# Patient Record
Sex: FEMALE | Race: WHITE | NOT HISPANIC OR LATINO | Employment: OTHER | URBAN - NONMETROPOLITAN AREA
[De-identification: names, ages, dates, MRNs, and addresses within clinical notes are randomized per-mention and may not be internally consistent; named-entity substitution may affect disease eponyms.]

---

## 2017-01-05 DIAGNOSIS — F32.A DEPRESSION: Primary | ICD-10-CM

## 2017-01-06 RX ORDER — FLUOXETINE 10 MG/1
CAPSULE ORAL
Qty: 30 CAPSULE | Refills: 0 | Status: SHIPPED | OUTPATIENT
Start: 2017-01-06 | End: 2017-02-01

## 2017-01-20 ENCOUNTER — OFFICE VISIT (OUTPATIENT)
Dept: FAMILY MEDICINE | Facility: OTHER | Age: 62
End: 2017-01-20
Attending: FAMILY MEDICINE
Payer: COMMERCIAL

## 2017-01-20 VITALS
TEMPERATURE: 99.2 F | SYSTOLIC BLOOD PRESSURE: 114 MMHG | HEART RATE: 67 BPM | DIASTOLIC BLOOD PRESSURE: 72 MMHG | HEIGHT: 60 IN | RESPIRATION RATE: 18 BRPM | BODY MASS INDEX: 25.91 KG/M2 | WEIGHT: 132 LBS | OXYGEN SATURATION: 96 %

## 2017-01-20 DIAGNOSIS — F33.1 MAJOR DEPRESSIVE DISORDER, RECURRENT EPISODE, MODERATE (H): Primary | ICD-10-CM

## 2017-01-20 PROCEDURE — 99213 OFFICE O/P EST LOW 20 MIN: CPT | Performed by: FAMILY MEDICINE

## 2017-01-20 ASSESSMENT — PAIN SCALES - GENERAL: PAINLEVEL: NO PAIN (0)

## 2017-01-20 NOTE — MR AVS SNAPSHOT
"              After Visit Summary   1/20/2017    Mechelle Fong    MRN: [id number]           Patient Information     Date Of Birth          1955        Visit Information        Provider Department      1/20/2017 11:20 AM Bhavin Khan MD [facility]        Today's Diagnoses     Major depressive disorder, recurrent episode, moderate (H)    -  1       Care Instructions    Increase fluoxetine to 20 mg daily        Follow-ups after your visit        Follow-up notes from your care team     Return in about 1 month (around 2/20/2017) for Follow Up Visit.      Who to contact     If you have questions or need follow up information about today's clinic visit or your schedule please contact [facility] directly at [phone].  Normal or non-critical lab and imaging results will be communicated to you by MyChart, letter or phone within 4 business days after the clinic has received the results. If you do not hear from us within 7 days, please contact the clinic through MyChart or phone. If you have a critical or abnormal lab result, we will notify you by phone as soon as possible.  Submit refill requests through [employer] or call your pharmacy and they will forward the refill request to us. Please allow 3 business days for your refill to be completed.          Additional Information About Your Visit        MyChart Information     [employer] lets you send messages to your doctor, view your test results, renew your prescriptions, schedule appointments and more. To sign up, go to [email or web address] . Click on \"Log in\" on the left side of the screen, which will take you to the Welcome page. Then click on \"Sign up Now\" on the right side of the page.     You will be asked to enter the access code listed below, as well as some personal information. Please follow the directions to create your username and password.     Your access code is: [email or web address]  Expires: 4/21/2017  4:09 PM     Your access code " will  in 90 days. If you need help or a new code, please call your [facility] or [phone].        Care EveryWhere ID     This is your Care EveryWhere ID. This could be used by other organizations to access your [place] medical records  [id number]        Your Vitals Were     Pulse Temperature Respirations Height BMI (Body Mass Index) Pulse Oximetry    67 99.2  F (37.3  C) (Tympanic) 18 5' (1.524 m) 25.78 kg/m2 96%       Blood Pressure from Last 3 Encounters:   17 114/72   16 111/62   11/04/15 112/60    Weight from Last 3 Encounters:   17 132 lb (59.875 kg)   16 133 lb (60.328 kg)   11/04/15 134 lb (60.782 kg)              Today, you had the following     No orders found for display         Today's Medication Changes          These changes are accurate as of: 17 11:59 PM.  If you have any questions, ask your nurse or doctor.               These medicines have changed or have updated prescriptions.        Dose/Directions    * FLUoxetine 10 MG capsule   Commonly known as:  PROzac   This may have changed:  Another medication with the same name was added. Make sure you understand how and when to take each.   Used for:  Depression        TAKE ONE CAPSULE BY MOUTH ONCE DAILY   Quantity:  30 capsule   Refills:  0       * FLUoxetine 20 MG capsule   Commonly known as:  PROzac   This may have changed:  You were already taking a medication with the same name, and this prescription was added. Make sure you understand how and when to take each.   Used for:  Major depressive disorder, recurrent episode, moderate (H)        Dose:  20 mg   Take 1 capsule (20 mg) by mouth daily   Quantity:  60 capsule   Refills:  3       * Notice:  This list has 2 medication(s) that are the same as other medications prescribed for you. Read the directions carefully, and ask your doctor or other care provider to review them with you.         Where to get your medicines      These medications were sent to  [facility] [place], [place] - [address]   [address] , [facility] [address]     Phone:  [phone]    - FLUoxetine 20 MG capsule             Primary Care Provider Office Phone # Fax #    Bhavin Khan [phone]       [facility] [address] [place] [address]        Thank you!     Thank you for choosing [facility]  for your care. Our goal is always to provide you with excellent care. Hearing back from our patients is one way we can continue to improve our services. Please take a few minutes to complete the written survey that you may receive in the mail after your visit with us. Thank you!             Your Updated Medication List - Protect others around you: Learn how to safely use, store and throw away your medicines at www.disposemymeds.org.          This list is accurate as of: 1/20/17 11:59 PM.  Always use your most recent med list.                   Brand Name Dispense Instructions for use    clonazePAM 0.5 MG tablet    klonoPIN    60 tablet    Take 0.5-1 tablets (0.25-0.5 mg) by mouth 2 times daily as needed for anxiety       * FLUoxetine 10 MG capsule    PROzac    30 capsule    TAKE ONE CAPSULE BY MOUTH ONCE DAILY       * FLUoxetine 20 MG capsule    PROzac    60 capsule    Take 1 capsule (20 mg) by mouth daily       * Notice:  This list has 2 medication(s) that are the same as other medications prescribed for you. Read the directions carefully, and ask your doctor or other care provider to review them with you.

## 2017-01-20 NOTE — NURSING NOTE
Chief Complaint   Patient presents with     Recheck Medication       Initial /72 mmHg  Pulse 67  Temp(Src) 99.2  F (37.3  C) (Tympanic)  Resp 18  Ht 5' (1.524 m)  Wt 132 lb (59.875 kg)  BMI 25.78 kg/m2  SpO2 96% Estimated body mass index is 25.78 kg/(m^2) as calculated from the following:    Height as of this encounter: 5' (1.524 m).    Weight as of this encounter: 132 lb (59.875 kg).  BP completed using cuff size: shi Mccoy LPN

## 2017-01-20 NOTE — PROGRESS NOTES
SUBJECTIVE:  Mechelle Fong, 61 year old, female is seen in follow up of depression with associated anxiety.  Her symptoms have worsened and she notes significant psychosocial stressors.  PHQ 9 and MOOKIE 7 are reviewed.    Current Outpatient Prescriptions   Medication Sig Dispense Refill     FLUoxetine (PROZAC) 10 MG capsule TAKE ONE CAPSULE BY MOUTH ONCE DAILY 30 capsule 0     clonazePAM (KLONOPIN) 0.5 MG tablet Take 0.5-1 tablets (0.25-0.5 mg) by mouth 2 times daily as needed for anxiety 60 tablet 0        Allergies   Allergen Reactions     Paxil [Paroxetine Mesylate] Other (See Comments)     Intolerance  Paroxetine HCL           Past Medical History   Diagnosis Date     Dysthymic disorder 10/22/2001     Depression 10/09/2001     Past Surgical History   Procedure Laterality Date     Excision       ovarian tumor, LT     Egd       Dilation and curettage       D&C     Colonoscopy       Release trigger finger       RT     Vaginal hysterectomy /lso       Breast biopsy       RT     Family History   Problem Relation Age of Onset     Cancer - colorectal Maternal Grandmother      CANCER Mother      Lung     CEREBROVASCULAR DISEASE Father      HEART DISEASE Father      Heart failure, cause of death     Hypertension Brother      HEART DISEASE Father 50     Myocardial infarction     Social History     Social History     Marital Status:      Spouse Name: N/A     Number of Children: N/A     Years of Education: N/A     Occupational History      [employer]     full-time     Social History Main Topics     Smoking status: Current Every Day Smoker     Types: Cigarettes     Smokeless tobacco: Not on file      Comment: tried to quit-yes     Alcohol Use: Yes      Comment: occasionally     Drug Use: Not on file     Sexual Activity: Not on file     Other Topics Concern     Caffeine Concern Yes     coffee, > 6 cups daily     Social History Narrative         Review Of Systems  Constitutional, HEENT, cardiovascular,  pulmonary, gi and gu systems are negative, except as otherwise noted.    OBJECTIVE:  Vitals: B/P: 111/62, T: 99.2, P: 70, R: 18    Exam:  Physical Exam   Constitutional: She is oriented to person, place, and time. She appears well-developed and well-nourished. No distress.   Neurological: She is alert and oriented to person, place, and time.   Psychiatric: She has a normal mood and affect.     Other exam not repeated.  Major depressive disorder, recurrent episode, moderate (H)  Increase fluoxetine to 20 mg daily. Follow up one month,  - FLUoxetine (PROZAC) 20 MG capsule; Take 1 capsule (20 mg) by mouth daily      Labs:  No results found for any previous visit.    ASSESSMENT/PLAN:              Bhavin Khan MD

## 2017-03-27 DIAGNOSIS — F41.1 GAD (GENERALIZED ANXIETY DISORDER): ICD-10-CM

## 2017-03-27 RX ORDER — CLONAZEPAM 0.5 MG/1
TABLET ORAL
Qty: 60 TABLET | Refills: 0 | Status: SHIPPED | OUTPATIENT
Start: 2017-03-27 | End: 2017-09-25

## 2017-03-27 NOTE — TELEPHONE ENCOUNTER
Klonopin      Last Written Prescription Date:  12.5.16  Last Fill Quantity: 60,   # refills: 0  Last Office Visit with [facility] prescribing provider: 1.20.17  Future Office visit:       Routing refill request to provider for review/approval because:  Drug not on the [facility] refill protocol or controlled substance

## 2017-08-27 ENCOUNTER — HEALTH MAINTENANCE LETTER (OUTPATIENT)
Age: 62
End: 2017-08-27

## 2017-09-25 DIAGNOSIS — F41.1 GAD (GENERALIZED ANXIETY DISORDER): ICD-10-CM

## 2017-09-25 DIAGNOSIS — F33.1 MAJOR DEPRESSIVE DISORDER, RECURRENT EPISODE, MODERATE (H): ICD-10-CM

## 2017-09-26 RX ORDER — CLONAZEPAM 0.5 MG/1
TABLET ORAL
Qty: 60 TABLET | Refills: 0 | Status: SHIPPED | OUTPATIENT
Start: 2017-09-26 | End: 2018-03-25

## 2017-09-26 NOTE — TELEPHONE ENCOUNTER
Prozac  Last office visit: 1/20/17 with note: Increase fluoxetine to 20 mg daily. Follow up one month.  No follow up.  No upcoming scheduled appointments.  Last refill: 1/20/17 #60, 3 R.    Lenka  Last refill: 3/27/17 #60, 0 R.  Medications pended.  Thank you.

## 2018-02-16 ENCOUNTER — OFFICE VISIT (OUTPATIENT)
Dept: FAMILY MEDICINE | Facility: OTHER | Age: 63
End: 2018-02-16
Attending: FAMILY MEDICINE
Payer: COMMERCIAL

## 2018-02-16 VITALS
OXYGEN SATURATION: 98 % | SYSTOLIC BLOOD PRESSURE: 114 MMHG | HEART RATE: 70 BPM | DIASTOLIC BLOOD PRESSURE: 68 MMHG | TEMPERATURE: 98.8 F | WEIGHT: 132 LBS | HEIGHT: 60 IN | BODY MASS INDEX: 25.91 KG/M2

## 2018-02-16 DIAGNOSIS — N76.0 ACUTE VAGINITIS: Primary | ICD-10-CM

## 2018-02-16 LAB
ALBUMIN UR-MCNC: NEGATIVE MG/DL
APPEARANCE UR: CLEAR
BACTERIA #/AREA URNS HPF: ABNORMAL /HPF
BILIRUB UR QL STRIP: NEGATIVE
COLOR UR AUTO: ABNORMAL
GLUCOSE UR STRIP-MCNC: NEGATIVE MG/DL
HGB UR QL STRIP: ABNORMAL
KETONES UR STRIP-MCNC: NEGATIVE MG/DL
LEUKOCYTE ESTERASE UR QL STRIP: NEGATIVE
MUCOUS THREADS #/AREA URNS LPF: PRESENT /LPF
NITRATE UR QL: NEGATIVE
PH UR STRIP: 6.5 PH (ref 4.7–8)
RBC #/AREA URNS AUTO: 5 /HPF (ref 0–2)
SOURCE: ABNORMAL
SP GR UR STRIP: 1.01 (ref 1–1.03)
SPECIMEN SOURCE: NORMAL
UROBILINOGEN UR STRIP-MCNC: NORMAL MG/DL (ref 0–2)
WBC #/AREA URNS AUTO: 0 /HPF (ref 0–2)
WET PREP SPEC: NORMAL

## 2018-02-16 PROCEDURE — 99213 OFFICE O/P EST LOW 20 MIN: CPT | Performed by: FAMILY MEDICINE

## 2018-02-16 PROCEDURE — 87210 SMEAR WET MOUNT SALINE/INK: CPT | Performed by: FAMILY MEDICINE

## 2018-02-16 PROCEDURE — 81001 URINALYSIS AUTO W/SCOPE: CPT | Performed by: FAMILY MEDICINE

## 2018-02-16 RX ORDER — METRONIDAZOLE 7.5 MG/G
1 GEL VAGINAL AT BEDTIME
Qty: 70 G | Refills: 0 | Status: SHIPPED | OUTPATIENT
Start: 2018-02-16 | End: 2018-04-27

## 2018-02-16 ASSESSMENT — ANXIETY QUESTIONNAIRES
7. FEELING AFRAID AS IF SOMETHING AWFUL MIGHT HAPPEN: NOT AT ALL
IF YOU CHECKED OFF ANY PROBLEMS ON THIS QUESTIONNAIRE, HOW DIFFICULT HAVE THESE PROBLEMS MADE IT FOR YOU TO DO YOUR WORK, TAKE CARE OF THINGS AT HOME, OR GET ALONG WITH OTHER PEOPLE: NOT DIFFICULT AT ALL
6. BECOMING EASILY ANNOYED OR IRRITABLE: NOT AT ALL
1. FEELING NERVOUS, ANXIOUS, OR ON EDGE: NOT AT ALL
3. WORRYING TOO MUCH ABOUT DIFFERENT THINGS: NOT AT ALL
2. NOT BEING ABLE TO STOP OR CONTROL WORRYING: NOT AT ALL
5. BEING SO RESTLESS THAT IT IS HARD TO SIT STILL: NOT AT ALL
GAD7 TOTAL SCORE: 0

## 2018-02-16 ASSESSMENT — PATIENT HEALTH QUESTIONNAIRE - PHQ9: 5. POOR APPETITE OR OVEREATING: NOT AT ALL

## 2018-02-16 ASSESSMENT — PAIN SCALES - GENERAL: PAINLEVEL: MILD PAIN (2)

## 2018-02-16 NOTE — PROGRESS NOTES
SUBJECTIVE:  Mechelle Fong, 63 year old, female is seen with the following medical problems.    Vaginal discharge.  She has developed discolored discharge over the last 3 days.  Mechelle used over the counter Monistat with minimal improvement. She has had a previous hysterectomy.  This is associated pelvic pain.    No fever, flank pain, nausea, vomiting, dysuria, hematuria, pneumaturia, nocturia, frequency, urgency, or incontinence.    Current Outpatient Prescriptions   Medication Sig Dispense Refill     clonazePAM (KLONOPIN) 0.5 MG tablet TAKE ONE-HALF TO ONE TABLET BY MOUTH TWICE DAILY AS NEEDED FOR ANXIETY 60 tablet 0     FLUoxetine (PROZAC) 20 MG capsule TAKE ONE CAPSULE BY MOUTH ONCE DAILY 30 capsule 7        Allergies   Allergen Reactions     Morphine Other (See Comments)     Hypotension      Paxil [Paroxetine Mesylate] Other (See Comments)     Intolerance  Paroxetine HCL           Past Medical History:   Diagnosis Date     Depression 10/09/2001     Dysthymic disorder 10/22/2001     Past Surgical History:   Procedure Laterality Date     breast biopsy      RT     COLONOSCOPY       DILATION AND CURETTAGE      D&C     EGD       excision      ovarian tumor, LT     RELEASE TRIGGER FINGER      RT     vaginal hysterectomy /LSO       Family History   Problem Relation Age of Onset     Cancer - colorectal Maternal Grandmother      CANCER Mother      Lung     CEREBROVASCULAR DISEASE Father      HEART DISEASE Father      Heart failure, cause of death     Hypertension Brother      HEART DISEASE Father 50     Myocardial infarction     Social History     Social History     Marital status:      Spouse name: N/A     Number of children: N/A     Years of education: N/A     Occupational History      [employer]     full-time     Social History Main Topics     Smoking status: Current Some Day Smoker     Types: Cigarettes     Smokeless tobacco: Never Used      Comment: tried to quit-yes     Alcohol use Yes       Comment: occasionally     Drug use: Not on file     Sexual activity: Not on file     Other Topics Concern     Caffeine Concern Yes     coffee, > 6 cups daily     Social History Narrative         Review Of Systems  Constitutional, HEENT, cardiovascular, pulmonary, gi and gu systems are negative, except as otherwise noted.    OBJECTIVE:  /68  Pulse 70  Temp 98.8  F (37.1  C) (Tympanic)  Ht 5' (1.524 m)  Wt 132 lb (59.9 kg)  SpO2 98%  BMI 25.78 kg/m2      Exam:  Physical Exam   Constitutional: She is oriented to person, place, and time. She appears well-developed and well-nourished. No distress.   Neurological: She is alert and oriented to person, place, and time.   Psychiatric: She has a normal mood and affect.     Other exam not repeated    Labs:  No results found for any previous visit.    ASSESSMENT/PLAN:  Acute vaginitis  UA reviewed and;negative.  Wet prep performed.  Suspect bacterial vaginosis.  Metrogel as written.  Follow up with persistent symptoms.  - UA reflex to Microscopic and Culture  - Wet prep  - metroNIDAZOLE (METROGEL-VAGINAL) 0.75 % vaginal gel; Place 1 applicator (5 g) vaginally At Bedtime            Bhavin Khan MD

## 2018-02-16 NOTE — NURSING NOTE
Chief Complaint   Patient presents with     Vaginal Problem     Possible vaginal infection- Started a week ago had discharge(green), and back pain. Tried monostat on tuesday night- back pain went away- no discharge today       Initial /68  Pulse 70  Temp 98.8  F (37.1  C) (Tympanic)  Ht 5' (1.524 m)  Wt 132 lb (59.9 kg)  SpO2 98%  BMI 25.78 kg/m2 Estimated body mass index is 25.78 kg/(m^2) as calculated from the following:    Height as of this encounter: 5' (1.524 m).    Weight as of this encounter: 132 lb (59.9 kg).  Medication Reconciliation: complete   MIA RUELAS LPN

## 2018-02-16 NOTE — MR AVS SNAPSHOT
"              After Visit Summary   2018    Mechelle Fong    MRN: [id number]           Patient Information     Date Of Birth          1955        Visit Information        Provider Department      2018 10:40 AM [facility]        Today's Diagnoses     Acute vaginitis    -  1      Care Instructions    Use metrogel vaginal as prescribed          Follow-ups after your visit        Follow-up notes from your care team     Return if symptoms worsen or fail to improve.      Who to contact     If you have questions or need follow up information about today's clinic visit or your schedule please contact [facility] directly at [phone].  Normal or non-critical lab and imaging results will be communicated to you by MyChart, letter or phone within 4 business days after the clinic has received the results. If you do not hear from us within 7 days, please contact the clinic through MyChart or phone. If you have a critical or abnormal lab result, we will notify you by phone as soon as possible.  Submit refill requests through [employer] or call your pharmacy and they will forward the refill request to us. Please allow 3 business days for your refill to be completed.          Additional Information About Your Visit        MyChart Information     [employer] lets you send messages to your doctor, view your test results, renew your prescriptions, schedule appointments and more. To sign up, go to [email or web address] . Click on \"Log in\" on the left side of the screen, which will take you to the Welcome page. Then click on \"Sign up Now\" on the right side of the page.     You will be asked to enter the access code listed below, as well as some personal information. Please follow the directions to create your username and password.     Your access code is: [email or web address]  Expires: 2018 10:01 AM     Your access code will  in 90 days. If you need help or a new code, " please call your [facility] clinic or [phone].        Care EveryWhere ID     This is your Care EveryWhere ID. This could be used by other organizations to access your [facility] medical records  [id number]        Your Vitals Were     Pulse Temperature Height Pulse Oximetry BMI (Body Mass Index)       70 98.8  F (37.1  C) (Tympanic) 5' (1.524 m) 98% 25.78 kg/m2        Blood Pressure from Last 3 Encounters:   02/16/18 114/68   01/20/17 114/72   05/11/16 111/62    Weight from Last 3 Encounters:   02/16/18 132 lb (59.9 kg)   01/20/17 132 lb (59.9 kg)   05/11/16 133 lb (60.3 kg)              We Performed the Following     UA reflex to Microscopic and Culture     Wet prep          Today's Medication Changes          These changes are accurate as of 2/16/18 11:59 PM.  If you have any questions, ask your nurse or doctor.               Start taking these medicines.        Dose/Directions    metroNIDAZOLE 0.75 % vaginal gel   Commonly known as:  METROGEL-VAGINAL   Used for:  Acute vaginitis   Started by:  Bhavin Khan MD        Dose:  1 applicator   Place 1 applicator (5 g) vaginally At Bedtime   Quantity:  70 g   Refills:  0            Where to get your medicines      These medications were sent to [facility] [address] [place] [address] [address]  [address] [facility] [address]     Phone:  [phone]     metroNIDAZOLE 0.75 % vaginal gel                Primary Care Provider Office Phone # Fax #    Bhavin Khan [phone] [address] [facility] [address]        Equal Access to Services     [facility] HospitalMAAME AH: Hadii aad ku hadasho Soomaali, waaxda luqadaha, qaybta kaalmada edwin lazaro. So [facility] [phone].    ATENCIÓN: Si habla español, tiene a marshall disposición servicios gratuitos de asistencia lingüística. Llame al [phone].    We comply with applicable federal civil rights laws and [place] laws. We do not  discriminate on the basis of race, color, national origin, age, disability, sex, sexual orientation, or gender identity.            Thank you!     [facility]. Our goal is always to provide you with excellent care. Hearing back from our patients is one way we can continue to improve our services. Please take a few minutes to complete the written survey that you may receive in the mail after your visit with us. Thank you!             Your Updated Medication List - Protect others around you: Learn how to safely use, store and throw away your medicines at www.disposemymeds.org.          This list is accurate as of 2/16/18 11:59 PM.  Always use your most recent med list.                   Brand Name Dispense Instructions for use Diagnosis    clonazePAM 0.5 MG tablet    klonoPIN    60 tablet    TAKE ONE-HALF TO ONE TABLET BY MOUTH TWICE DAILY AS NEEDED FOR ANXIETY    MOOKIE (generalized anxiety disorder)       FLUoxetine 20 MG capsule    PROzac    30 capsule    TAKE ONE CAPSULE BY MOUTH ONCE DAILY    Major depressive disorder, recurrent episode, moderate (H)       metroNIDAZOLE 0.75 % vaginal gel    METROGEL-VAGINAL    70 g    Place 1 applicator (5 g) vaginally At Bedtime    Acute vaginitis

## 2018-02-17 ASSESSMENT — ANXIETY QUESTIONNAIRES: GAD7 TOTAL SCORE: 0

## 2018-02-17 ASSESSMENT — PATIENT HEALTH QUESTIONNAIRE - PHQ9: SUM OF ALL RESPONSES TO PHQ QUESTIONS 1-9: 0

## 2018-03-25 DIAGNOSIS — F41.1 GAD (GENERALIZED ANXIETY DISORDER): ICD-10-CM

## 2018-03-27 RX ORDER — CLONAZEPAM 0.5 MG/1
TABLET ORAL
Qty: 60 TABLET | Refills: 0 | Status: SHIPPED | OUTPATIENT
Start: 2018-03-27 | End: 2018-12-20

## 2018-03-27 NOTE — TELEPHONE ENCOUNTER
Klonopin       Last Written Prescription Date:  9/26/2017  Last Fill Quantity: 60,   # refills: 0  Last Office Visit: 2/16/2018  Future Office visit:

## 2018-04-27 ENCOUNTER — OFFICE VISIT (OUTPATIENT)
Dept: FAMILY MEDICINE | Facility: OTHER | Age: 63
End: 2018-04-27
Attending: FAMILY MEDICINE
Payer: COMMERCIAL

## 2018-04-27 VITALS
RESPIRATION RATE: 18 BRPM | HEIGHT: 60 IN | HEART RATE: 76 BPM | TEMPERATURE: 99.4 F | WEIGHT: 132 LBS | SYSTOLIC BLOOD PRESSURE: 122 MMHG | BODY MASS INDEX: 25.91 KG/M2 | DIASTOLIC BLOOD PRESSURE: 62 MMHG | OXYGEN SATURATION: 99 %

## 2018-04-27 DIAGNOSIS — N89.8 VAGINAL DISCHARGE: ICD-10-CM

## 2018-04-27 DIAGNOSIS — L98.9 SKIN LESION: ICD-10-CM

## 2018-04-27 DIAGNOSIS — Z12.11 SPECIAL SCREENING FOR MALIGNANT NEOPLASMS, COLON: Primary | ICD-10-CM

## 2018-04-27 PROCEDURE — 99213 OFFICE O/P EST LOW 20 MIN: CPT | Performed by: FAMILY MEDICINE

## 2018-04-27 RX ORDER — CLINDAMYCIN HCL 150 MG
150 CAPSULE ORAL 3 TIMES DAILY
Qty: 21 CAPSULE | Refills: 0 | Status: SHIPPED | OUTPATIENT
Start: 2018-04-27 | End: 2018-05-15

## 2018-04-27 ASSESSMENT — PAIN SCALES - GENERAL: PAINLEVEL: NO PAIN (0)

## 2018-04-27 ASSESSMENT — ANXIETY QUESTIONNAIRES
GAD7 TOTAL SCORE: 5
IF YOU CHECKED OFF ANY PROBLEMS ON THIS QUESTIONNAIRE, HOW DIFFICULT HAVE THESE PROBLEMS MADE IT FOR YOU TO DO YOUR WORK, TAKE CARE OF THINGS AT HOME, OR GET ALONG WITH OTHER PEOPLE: SOMEWHAT DIFFICULT
5. BEING SO RESTLESS THAT IT IS HARD TO SIT STILL: NOT AT ALL
1. FEELING NERVOUS, ANXIOUS, OR ON EDGE: SEVERAL DAYS
6. BECOMING EASILY ANNOYED OR IRRITABLE: SEVERAL DAYS
2. NOT BEING ABLE TO STOP OR CONTROL WORRYING: NOT AT ALL
7. FEELING AFRAID AS IF SOMETHING AWFUL MIGHT HAPPEN: SEVERAL DAYS
4. TROUBLE RELAXING: SEVERAL DAYS
3. WORRYING TOO MUCH ABOUT DIFFERENT THINGS: SEVERAL DAYS

## 2018-04-27 NOTE — PROGRESS NOTES
SUBJECTIVE:   Mechelle Fong is a 63 year old female who presents to clinic today for the following health issues:    Derm problem      Duration: N/A    Description (location/character/radiation): irregular shaped mole on left arm    Intensity:  N/A    Accompanying signs and symptoms: N/A    History (similar episodes/previous evaluation): None    Precipitating or alleviating factors: None    Therapies tried and outcome: None       Vaginal Symptoms      Duration: 1 month    Description  vaginal discharge - discolored    Intensity:  moderate    Accompanying signs and symptoms (fever/dysuria/abdominal or back pain): None    History  Sexually active: not at present  Possibility of pregnancy: No  Recent antibiotic use: no     Precipitating or alleviating factors: None    Therapies tried and outcome: douche and Metrogel Vaginal   Outcome: short term improvement      Problem list and histories reviewed & adjusted, as indicated.  Additional history: as documented    Patient Active Problem List   Diagnosis     Major depressive disorder, recurrent episode, moderate (H)     ACP (advance care planning)     MOOKIE (generalized anxiety disorder)     Past Surgical History:   Procedure Laterality Date     breast biopsy      RT     COLONOSCOPY       DILATION AND CURETTAGE      D&C     EGD       excision      ovarian tumor, LT     RELEASE TRIGGER FINGER      RT     vaginal hysterectomy /LSO         Social History   Substance Use Topics     Smoking status: Current Some Day Smoker     Types: Cigarettes     Smokeless tobacco: Never Used      Comment: tried to quit-yes     Alcohol use Yes      Comment: occasionally     Family History   Problem Relation Age of Onset     CEREBROVASCULAR DISEASE Father      HEART DISEASE Father 50     Heart failure, cause of death/Myocardial infarction     Cancer - colorectal Maternal Grandmother      CANCER Mother      Lung     Hypertension Brother          Current Outpatient Prescriptions   Medication Sig  Dispense Refill     clindamycin (CLEOCIN) 150 MG capsule Take 1 capsule (150 mg) by mouth 3 times daily 21 capsule 0     clonazePAM (KLONOPIN) 0.5 MG tablet TAKE ONE-HALF TO ONE TABLET BY MOUTH TWICE DAILY AS NEEDED FOR ANXIETY 60 tablet 0     FLUoxetine (PROZAC) 20 MG capsule TAKE ONE CAPSULE BY MOUTH ONCE DAILY 30 capsule 7     Allergies   Allergen Reactions     Morphine Other (See Comments)     Hypotension      Paxil [Paroxetine Mesylate] Other (See Comments)     Intolerance  Paroxetine HCL           Reviewed and updated as needed this visit by clinical staff       Reviewed and updated as needed this visit by Provider         ROS:  Constitutional, HEENT, cardiovascular, pulmonary, gi and gu systems are negative, except as otherwise noted.    OBJECTIVE:     There were no vitals taken for this visit.  There is no height or weight on file to calculate BMI.  Physical Exam   Constitutional: She is oriented to person, place, and time. She appears well-developed and well-nourished. No distress.   Neurological: She is alert and oriented to person, place, and time.   Skin:   There is a raised, firm, irregular lesion note left anterior forearm   Psychiatric: She has a normal mood and affect.     Other exam not repeated    Diagnostic Test Results:  none     ASSESSMENT/PLAN:     Skin lesion  Etiology undetermined.  Appointment with General Surgery  scheduled for evaluation and recommendations for further management.    - GENERAL SURG ADULT REFERRAL    Vaginal discharge  Suspect recurrent bacterial vaginosis.  Oral clindamycin as written.  - clindamycin (CLEOCIN) 150 MG capsule; Take 1 capsule (150 mg) by mouth 3 times daily    Special screening for malignant neoplasms, colon  IFOB given  - Immunos occult blood; Future            Bhavin Khan MD  [facility]

## 2018-04-27 NOTE — MR AVS SNAPSHOT
After Visit Summary   4/27/2018    Mechelle Fong    MRN: [id number]           Patient Information     Date Of Birth          1955        Visit Information        Provider Department      4/27/2018 3:40 PM Bhavin Khan MD [facility]        Today's Diagnoses     Special screening for malignant neoplasms, colon    -  1    Skin lesion        Vaginal discharge          Care Instructions    Appointment with General Surgery scheduled for evaluation and recommendations for further management.           Follow-ups after your visit        Additional Services     GENERAL SURG ADULT REFERRAL       Your provider has referred you to: [facility] [phone]   [email or web address]    Please be aware that coverage of these services is subject to the terms and limitations of your health insurance plan.  Call member services at your health plan with any benefit or coverage questions.      Please bring the following with you to your appointment:    (1) Any X-Rays, CTs or MRIs which have been performed.  Contact the facility where they were done to arrange for  prior to your scheduled appointment.   (2) List of current medications   (3) This referral request   (4) Any documents/labs given to you for this referral                  Follow-up notes from your care team     Return if symptoms worsen or fail to improve.      Future tests that were ordered for you today     Open Future Orders        Priority Expected Expires Ordered    Immunos occult blood Routine  4/27/2019 4/27/2018            Who to contact     If you have questions or need follow up information about today's clinic visit or your schedule please contact [facility] directly at [phone].  Normal or non-critical lab and imaging results will be communicated to you by MyChart, letter or phone within 4 business days after the clinic has  "received the results. If you do not hear from us within 7 days, please contact the clinic through Saygent or phone. If you have a critical or abnormal lab result, we will notify you by phone as soon as possible.  Submit refill requests through Saygent or call your pharmacy and they will forward the refill request to us. Please allow 3 business days for your refill to be completed.          Additional Information About Your Visit        Saygent Information     Saygent lets you send messages to your doctor, view your test results, renew your prescriptions, schedule appointments and more. To sign up, go to [email or web address] . Click on \"Log in\" on the left side of the screen, which will take you to the Welcome page. Then click on \"Sign up Now\" on the right side of the page.     You will be asked to enter the access code listed below, as well as some personal information. Please follow the directions to create your username and password.     Your access code is: [email or web address]  Expires: 2018 11:01 AM     Your access code will  in 90 days. If you need help or a new code, please call your [place] clinic or [phone].        Care EveryWhere ID     This is your Care EveryWhere ID. This could be used by other organizations to access your [place] medical records  [id number]        Your Vitals Were     Pulse Temperature Respirations Height Pulse Oximetry BMI (Body Mass Index)    76 99.4  F (37.4  C) (Tympanic) 18 5' (1.524 m) 99% 25.78 kg/m2       Blood Pressure from Last 3 Encounters:   18 122/62   18 114/68   17 114/72    Weight from Last 3 Encounters:   18 132 lb (59.9 kg)   18 132 lb (59.9 kg)   17 132 lb (59.9 kg)              We Performed the Following     GENERAL SURG ADULT REFERRAL          Today's Medication Changes          These changes are accurate as of 18 11:59 PM.  If you have any questions, ask your nurse or doctor.               Start taking these " medicines.        Dose/Directions    clindamycin 150 MG capsule   Commonly known as:  CLEOCIN   Used for:  Vaginal discharge   Started by:  Bhavin Khan MD        Dose:  150 mg   Take 1 capsule (150 mg) by mouth 3 times daily   Quantity:  21 capsule   Refills:  0            Where to get your medicines      These medications were sent to [facility] [address] [place] [address] [address]   [address] , [facility] [address]     Phone:  [phone]     clindamycin 150 MG capsule                Primary Care Provider Office Phone # Fax #    Bhavin Khan [phone]       [address] [facility] [address]        Equal Access to Services     [facility]: Hadii dilia linares hadasho Soomaali, waaxda luqadaha, qaybta kaalmada adeegyada, edwin cotton . So [facility] [phone].    ATENCIÓN: Si habla español, tiene a marshall disposición servicios gratuitos de asistencia lingüística. LlGreene [facility] [phone].    We comply with applicable federal civil rights laws and [place] laws. We do not discriminate on the basis of race, color, national origin, age, disability, sex, sexual orientation, or gender identity.            Thank you!     Thank you for choosing [facility]]  for your care. Our goal is always to provide you with excellent care. Hearing back from our patients is one way we can continue to improve our services. Please take a few minutes to complete the written survey that you may receive in the mail after your visit with us. Thank you!             Your Updated Medication List - Protect others around you: Learn how to safely use, store and throw away your medicines at www.disposemymeds.org.          This list is accurate as of 4/27/18 11:59 PM.  Always use your most recent med list.                   Brand Name Dispense Instructions for use Diagnosis    clindamycin 150 MG capsule    CLEOCIN    21 capsule    Take 1 capsule (150 mg) by mouth 3  times daily    Vaginal discharge       clonazePAM 0.5 MG tablet    klonoPIN    60 tablet    TAKE ONE-HALF TO ONE TABLET BY MOUTH TWICE DAILY AS NEEDED FOR ANXIETY    MOOKIE (generalized anxiety disorder)       FLUoxetine 20 MG capsule    PROzac    30 capsule    TAKE ONE CAPSULE BY MOUTH ONCE DAILY    Major depressive disorder, recurrent episode, moderate (H)

## 2018-04-27 NOTE — NURSING NOTE
Chief Complaint   Patient presents with     Derm Problem       Initial /62 (BP Location: Right arm, Patient Position: Sitting, Cuff Size: Adult Regular)  Pulse 76  Temp 99.4  F (37.4  C) (Tympanic)  Resp 18  Ht 5' (1.524 m)  Wt 132 lb (59.9 kg)  SpO2 99%  BMI 25.78 kg/m2 Estimated body mass index is 25.78 kg/(m^2) as calculated from the following:    Height as of this encounter: 5' (1.524 m).    Weight as of this encounter: 132 lb (59.9 kg).  Medication Reconciliation: complete   Kenisha Bhandari LPN

## 2018-04-28 ASSESSMENT — ANXIETY QUESTIONNAIRES: GAD7 TOTAL SCORE: 5

## 2018-04-28 ASSESSMENT — PATIENT HEALTH QUESTIONNAIRE - PHQ9: SUM OF ALL RESPONSES TO PHQ QUESTIONS 1-9: 3

## 2018-04-28 NOTE — PATIENT INSTRUCTIONS
Appointment with General Surgery scheduled for evaluation and recommendations for further management.

## 2018-04-30 DIAGNOSIS — Z12.11 SPECIAL SCREENING FOR MALIGNANT NEOPLASMS, COLON: ICD-10-CM

## 2018-04-30 PROCEDURE — 82274 ASSAY TEST FOR BLOOD FECAL: CPT | Performed by: FAMILY MEDICINE

## 2018-05-03 LAB — HEMOCCULT SP1 STL QL: NEGATIVE

## 2018-05-15 ENCOUNTER — OFFICE VISIT (OUTPATIENT)
Dept: SURGERY | Facility: OTHER | Age: 63
End: 2018-05-15
Attending: FAMILY MEDICINE
Payer: COMMERCIAL

## 2018-05-15 VITALS
SYSTOLIC BLOOD PRESSURE: 136 MMHG | BODY MASS INDEX: 25.91 KG/M2 | TEMPERATURE: 99.3 F | WEIGHT: 132 LBS | OXYGEN SATURATION: 98 % | DIASTOLIC BLOOD PRESSURE: 74 MMHG | HEART RATE: 73 BPM | HEIGHT: 60 IN

## 2018-05-15 DIAGNOSIS — L98.9 SKIN LESION: ICD-10-CM

## 2018-05-15 PROCEDURE — 99203 OFFICE O/P NEW LOW 30 MIN: CPT | Mod: 25 | Performed by: SURGERY

## 2018-05-15 PROCEDURE — 17110 DESTRUCTION B9 LES UP TO 14: CPT | Performed by: SURGERY

## 2018-05-15 ASSESSMENT — PAIN SCALES - GENERAL: PAINLEVEL: NO PAIN (0)

## 2018-05-15 NOTE — MR AVS SNAPSHOT
"              After Visit Summary   5/15/2018    Mechelle Fong    MRN: [id number]           Patient Information     Date Of Birth          1955        Visit Information        Provider Department      5/15/2018 1:30 PM Gerber Panda MD [facility]        Today's Diagnoses     Skin lesion          Care Instructions    Thank you for allowing Dr. Panda and our surgical team to participate in your care.  If you have a scheduling or an appointment question please contact Inna, our Health Unit Coordinator at her direct line [phone].   ALL nursing questions or concerns can be directed to your surgical nurse at: [phone].          Follow-ups after your visit        Who to contact     If you have questions or need follow up information about today's clinic visit or your schedule please contact [facility] at [phone].  Normal or non-critical lab and imaging results will be communicated to you by [employer], letter or phone within 4 business days after the clinic has received the results. If you do not hear from us within 7 days, please contact the clinic through MyChart or phone. If you have a critical or abnormal lab result, we will notify you by phone as soon as possible.  Submit refill requests through ShopSuey or call your pharmacy and they will forward the refill request to us. Please allow 3 business days for your refill to be completed.          Additional Information About Your Visit        MyChart Information     [employer]y lets you send messages to your doctor, view your test results, renew your prescriptions, schedule appointments and more. To sign up, go to [email or web address] . Click on \"Log in\" on the left side of the screen, which will take you to the Welcome page. Then click on \"Sign up Now\" on the right side of the page.     You will be asked to enter the access code listed below, as well as some personal information. Please follow the directions to " create your username and password.     Your access code is: [email or web address]  Expires: 2018 11:01 AM     Your access code will  in 90 days. If you need help or a new code, please call your [place] clinic or [phone].        Care EveryWhere ID     This is your Care EveryWhere ID. This could be used by other organizations to access your [facility] medical records  [id number]        Your Vitals Were     Pulse Temperature Height Pulse Oximetry BMI (Body Mass Index)       73 99.3  F (37.4  C) (Tympanic) 5' (1.524 m) 98% 25.78 kg/m2        Blood Pressure from Last 3 Encounters:   05/15/18 136/74   18 122/62   18 114/68    Weight from Last 3 Encounters:   05/15/18 132 lb (59.9 kg)   18 132 lb (59.9 kg)   18 132 lb (59.9 kg)              Today, you had the following     No orders found for display       Primary Care Provider Office Phone # Fax #    Bhavin Khan [phone]       [address] [place] [address]        Equal Access to Services     [facility]: Hadgertrude maliko Sobessie, waaxda luvladadaha, qaybta kaalmada adekalpeshyada, edwin carrera. So [facility] [phone].    ATENCIÓN: Si habla español, tiene a marshall disposición servicios gratuitos de asistencia lingüística. Llame al [phone].    We comply with applicable federal civil rights laws and [place] laws. We do not discriminate on the basis of race, color, national origin, age, disability, sex, sexual orientation, or gender identity.            Thank you!     Thank you for choosing [facility]  for your care. Our goal is always to provide you with excellent care. Hearing back from our patients is one way we can continue to improve our services. Please take a few minutes to complete the written survey that you may receive in the mail after your visit with us. Thank you!             Your Updated Medication List - Protect others around you: Learn how to safely  use, store and throw away your medicines at www.disposemymeds.org.          This list is accurate as of 5/15/18  1:51 PM.  Always use your most recent med list.                   Brand Name Dispense Instructions for use Diagnosis    clonazePAM 0.5 MG tablet    klonoPIN    60 tablet    TAKE ONE-HALF TO ONE TABLET BY MOUTH TWICE DAILY AS NEEDED FOR ANXIETY    MOOKIE (generalized anxiety disorder)       FLUoxetine 20 MG capsule    PROzac    30 capsule    TAKE ONE CAPSULE BY MOUTH ONCE DAILY    Major depressive disorder, recurrent episode, moderate (H)

## 2018-05-15 NOTE — PATIENT INSTRUCTIONS
Thank you for allowing Dr. Panda and our surgical team to participate in your care.  If you have a scheduling or an appointment question please contact Inna, our Health Unit Coordinator at her direct line [phone].   ALL nursing questions or concerns can be directed to your surgical nurse at: [phone].

## 2018-05-15 NOTE — PROGRESS NOTES
[facility] Surgery Consultation    CC:  Left forearm lesion     HPI:  This 63 year old year old female is seen at the request of Dr. Khan for evaluation of left forearm lesion. She first noticed it 2-3 weeks ago. It is flat, with some asymmetry. It does not bleed. She has sun spots on both arms. She has seen a dermatologist in the past but it has been a while.      Past Medical History:   Diagnosis Date     Depression 10/09/2001     Dysthymic disorder 10/22/2001       Past Surgical History:   Procedure Laterality Date     breast biopsy      RT     COLONOSCOPY       DILATION AND CURETTAGE      D&C     EGD       excision      ovarian tumor, LT     RELEASE TRIGGER FINGER      RT     vaginal hysterectomy /LSO         Allergies   Allergen Reactions     Morphine Other (See Comments)     Hypotension      Paxil [Paroxetine Mesylate] Other (See Comments)     Intolerance  Paroxetine HCL           Current Outpatient Prescriptions   Medication     clonazePAM (KLONOPIN) 0.5 MG tablet     FLUoxetine (PROZAC) 20 MG capsule     No current facility-administered medications for this visit.        HABITS:    Social History   Substance Use Topics     Smoking status: Current Some Day Smoker     Types: Cigarettes     Smokeless tobacco: Never Used      Comment: tried to quit-yes     Alcohol use Yes      Comment: occasionally       Family History   Problem Relation Age of Onset     CEREBROVASCULAR DISEASE Father      HEART DISEASE Father 50     Heart failure, cause of death/Myocardial infarction     Cancer - colorectal Maternal Grandmother      CANCER Mother      Lung     Hypertension Brother        REVIEW OF SYSTEMS:  Ten point review of systems negative except those mentioned in the HPI.     OBJECTIVE:    /74 (BP Location: Right arm, Patient Position: Sitting, Cuff Size: Adult Regular)  Pulse 73  Temp 99.3  F (37.4  C) (Tympanic)  Ht 5' (1.524 m)  Wt 132 lb (59.9 kg)  SpO2 98%  BMI 25.78 kg/m2    GENERAL: Generally  appears well, in no distress with appropriate affect.  HEENT:   Sclerae anicteric - No cervical, supra/infraclavciular lymphadenopathy, Respiratory:  No acute distress, no splinting   Cardiovascular:  Regular Rate and Rhythm  :  deferred  Extremities:  Extremities normal. No deformities, edema, or skin discoloration.  Skin:  Bilateral arms with pigmented lesions, There is a 1.5cm flat macular lesion with slight asymmetry and scale over the top.   Neurological: grossly intact  Psych:  Alert, oriented, affect appropriate with normal decision making ability.    IMPRESSION:      Left arm skin lesion, looks consistent with seborrheic    keratosis will attempt cryotherapy first before excising. She is agreement with plan.      PLAN:    Cryo-surgery   Follow up in 2 weeks either for additional cryotherapy vs excisional biopsy.     Gerber Panda MD,     5/15/2018  2:02 PM            Procedure:cryotherapy  Diagnosis: benign growth(s)  Location: lower arms  Specimen number: 1  Lesion size: 1cm   3 applications of Cryotherapy with LN2 with 5 mm margins and 10 second thaw cycle. Curettage and cryotherapy performed in duplicate. Follow up in 2 weeks.

## 2018-05-15 NOTE — NURSING NOTE
Chief Complaint   Patient presents with     Consult     skin lesion- irregular shaped; ref by Dr Khan       Initial /74 (BP Location: Right arm, Patient Position: Sitting, Cuff Size: Adult Regular)  Pulse 73  Temp 99.3  F (37.4  C) (Tympanic)  Ht 5' (1.524 m)  Wt 132 lb (59.9 kg)  SpO2 98%  BMI 25.78 kg/m2 Estimated body mass index is 25.78 kg/(m^2) as calculated from the following:    Height as of this encounter: 5' (1.524 m).    Weight as of this encounter: 132 lb (59.9 kg).  Medication Reconciliation: complete    Lisa Alarcon LPN

## 2018-05-21 DIAGNOSIS — F33.1 MAJOR DEPRESSIVE DISORDER, RECURRENT EPISODE, MODERATE (H): ICD-10-CM

## 2018-05-21 NOTE — TELEPHONE ENCOUNTER
prozac      Last Written Prescription Date:  9/26/17  Last Fill Quantity: 30,   # refills: 7  Last Office Visit: 4/27/18  Future Office visit:    Next 5 appointments (look out 90 days)     May 29, 2018  3:00 PM CDT   (Arrive by 2:45 PM)   Return Visit with Gerber Panda MD   [facility] ([facility] )    [address] [place] [address]   [phone]

## 2018-05-29 RX ORDER — CLINDAMYCIN HCL 150 MG
CAPSULE ORAL
COMMUNITY
Start: 2018-04-27 | End: 2018-11-05

## 2018-05-29 RX ORDER — METRONIDAZOLE 7.5 MG/G
GEL VAGINAL
COMMUNITY
Start: 2018-02-16 | End: 2018-11-05

## 2018-08-21 DIAGNOSIS — F33.1 MAJOR DEPRESSIVE DISORDER, RECURRENT EPISODE, MODERATE (H): ICD-10-CM

## 2018-10-26 DIAGNOSIS — F33.1 MAJOR DEPRESSIVE DISORDER, RECURRENT EPISODE, MODERATE (H): ICD-10-CM

## 2018-10-26 NOTE — LETTER
October 26, 2018      Mechelle Fong  [address]   Counts include 234 beds at the [facility] [address]        Dear Mechelle,     APPOINTMENT REMINDER:   Our records indicates that it is time for you to be seen for a follow-up with Dr. Khan and PHQ-9     Your current medication request for fluoxetine will be approved for one refill but you will need to be seen before any additional refills can be approved. Taking care of your health is important to us, and ongoing visits with your provider are vital to your care.    We look forward to seeing you in the near future.  You may call our office at [phone] to schedule a visit.     Please disregard this notice if you have already made an appointment.        Sincerely,  Bhavin Khan MD

## 2018-10-26 NOTE — TELEPHONE ENCOUNTER
Fluoxetine 20mg  Last Written Prescription Date:  8-  Last Fill Quantity: 90,   # refills: 04  Last Office Visit: 4-  Future Office visit:    Next 5 appointments (look out 90 days)     Nov 05, 2018 11:20 AM CST   (Arrive by 11:05 AM)   [facility]    [address] [place] [address]   [phone]              Patient was advised she is due for a follow up appointment.   Mya Montes LPN on 10/26/2018 at 9:51 AM

## 2018-11-05 ENCOUNTER — OFFICE VISIT (OUTPATIENT)
Dept: FAMILY MEDICINE | Facility: OTHER | Age: 63
End: 2018-11-05
Attending: FAMILY MEDICINE
Payer: COMMERCIAL

## 2018-11-05 VITALS
SYSTOLIC BLOOD PRESSURE: 128 MMHG | TEMPERATURE: 99.2 F | BODY MASS INDEX: 27.54 KG/M2 | HEART RATE: 77 BPM | WEIGHT: 141 LBS | OXYGEN SATURATION: 98 % | DIASTOLIC BLOOD PRESSURE: 70 MMHG

## 2018-11-05 DIAGNOSIS — Z71.6 TOBACCO ABUSE COUNSELING: ICD-10-CM

## 2018-11-05 DIAGNOSIS — F41.1 GAD (GENERALIZED ANXIETY DISORDER): ICD-10-CM

## 2018-11-05 DIAGNOSIS — F33.1 MAJOR DEPRESSIVE DISORDER, RECURRENT EPISODE, MODERATE (H): Primary | ICD-10-CM

## 2018-11-05 DIAGNOSIS — Z72.0 TOBACCO ABUSE: ICD-10-CM

## 2018-11-05 PROCEDURE — 99213 OFFICE O/P EST LOW 20 MIN: CPT | Performed by: FAMILY MEDICINE

## 2018-11-05 ASSESSMENT — ANXIETY QUESTIONNAIRES
5. BEING SO RESTLESS THAT IT IS HARD TO SIT STILL: NOT AT ALL
7. FEELING AFRAID AS IF SOMETHING AWFUL MIGHT HAPPEN: NOT AT ALL
1. FEELING NERVOUS, ANXIOUS, OR ON EDGE: SEVERAL DAYS
2. NOT BEING ABLE TO STOP OR CONTROL WORRYING: NOT AT ALL
6. BECOMING EASILY ANNOYED OR IRRITABLE: SEVERAL DAYS
4. TROUBLE RELAXING: SEVERAL DAYS
GAD7 TOTAL SCORE: 4
3. WORRYING TOO MUCH ABOUT DIFFERENT THINGS: SEVERAL DAYS

## 2018-11-05 ASSESSMENT — PATIENT HEALTH QUESTIONNAIRE - PHQ9: SUM OF ALL RESPONSES TO PHQ QUESTIONS 1-9: 0

## 2018-11-05 ASSESSMENT — PAIN SCALES - GENERAL: PAINLEVEL: NO PAIN (0)

## 2018-11-05 NOTE — NURSING NOTE
Chief Complaint   Patient presents with     Depression     Anxiety       Initial /70  Pulse 77  Temp 99.2  F (37.3  C) (Tympanic)  Wt 141 lb (64 kg)  SpO2 98%  BMI 27.54 kg/m2 Estimated body mass index is 27.54 kg/(m^2) as calculated from the following:    Height as of 5/15/18: 5' (1.524 m).    Weight as of this encounter: 141 lb (64 kg).  Medication Reconciliation: complete    Cyn Hernandez LPN

## 2018-11-05 NOTE — PATIENT INSTRUCTIONS

## 2018-11-05 NOTE — PROGRESS NOTES
SUBJECTIVE:   Mechelle Fong is a 63 year old female who presents to clinic today for the following health issues:    Depression and Anxiety Follow-Up    Status since last visit: No change    Other associated symptoms:None    Complicating factors:     Significant life event: Yes-  Father in law is dying      Current substance abuse: None    PHQ 5/11/2016 2/16/2018 4/27/2018   PHQ-9 Total Score 1 0 3   Q9: Suicide Ideation Not at all Not at all Not at all     MOOKIE-7 SCORE 2/16/2018 4/27/2018   Total Score 0 5     In the past two weeks have you had thoughts of suicide or self-harm?  No.    Do you have concerns about your personal safety or the safety of others?   No  PHQ-9  English  PHQ-9   Any Language  MOOKIE-7  Suicide Assessment Five-step Evaluation and Treatment (SAFE-T)    Amount of exercise or physical activity: None    Problems taking medications regularly: No    Medication side effects: none    Diet: regular (no restrictions)          Problem list and histories reviewed & adjusted, as indicated.  Additional history: as documented    Patient Active Problem List   Diagnosis     Major depressive disorder, recurrent episode, moderate (H)     ACP (advance care planning)     MOOKIE (generalized anxiety disorder)     Past Surgical History:   Procedure Laterality Date     breast biopsy      RT     COLONOSCOPY       DILATION AND CURETTAGE      D&C     EGD       excision      ovarian tumor, LT     RELEASE TRIGGER FINGER      RT     vaginal hysterectomy /LSO         Social History   Substance Use Topics     Smoking status: Current Some Day Smoker     Types: Cigarettes     Smokeless tobacco: Never Used      Comment: tried to quit-yes     Alcohol use Yes      Comment: occasionally     Family History   Problem Relation Age of Onset     Cerebrovascular Disease Father      HEART DISEASE Father 50     Heart failure, cause of death/Myocardial infarction     Cancer - colorectal Maternal Grandmother      Cancer Mother      Lung      Hypertension Brother          Current Outpatient Prescriptions   Medication Sig Dispense Refill     clindamycin (CLEOCIN) 150 MG capsule        clonazePAM (KLONOPIN) 0.5 MG tablet TAKE ONE-HALF TO ONE TABLET BY MOUTH TWICE DAILY AS NEEDED FOR ANXIETY 60 tablet 0     FLUoxetine (PROZAC) 20 MG capsule TAKE 1 CAPSULE BY MOUTH ONCE DAILY 30 capsule 0     metroNIDAZOLE (METROGEL) 0.75 % vaginal gel        Allergies   Allergen Reactions     Morphine Other (See Comments)     Hypotension      Paxil [Paroxetine Mesylate] Other (See Comments)     Intolerance  Paroxetine HCL         BP Readings from Last 3 Encounters:   05/15/18 136/74   04/27/18 122/62   02/16/18 114/68    Wt Readings from Last 3 Encounters:   05/15/18 132 lb (59.9 kg)   04/27/18 132 lb (59.9 kg)   02/16/18 132 lb (59.9 kg)                    Reviewed and updated as needed this visit by clinical staff       Reviewed and updated as needed this visit by Provider         ROS:  Constitutional, HEENT, cardiovascular, pulmonary, gi and gu systems are negative, except as otherwise noted.    OBJECTIVE:     There were no vitals taken for this visit.  There is no height or weight on file to calculate BMI.  Physical Exam   Constitutional: She is oriented to person, place, and time. She appears well-developed and well-nourished. No distress.   Neurological: She is alert and oriented to person, place, and time.   Psychiatric: She has a normal mood and affect.       Other exam not repeated.  Diagnostic Test Results:  none     ASSESSMENT/PLAN:     Major depressive disorder, recurrent episode, moderate (H)  Stable.  Continue same medications as outlined.  Instructed in the need for Comprehensive Medical Examination    MOOKIE (generalized anxiety disorder)  As above    Tobacco abuse  Discussed  - Tobacco Cessation - Order to Satisfy Health Maintenance    Tobacco abuse counseling  As above.            Bhavin Khan MD  [facility]

## 2018-11-05 NOTE — MR AVS SNAPSHOT
After Visit Summary   11/5/2018    Mechelle Fong    MRN: [id number]           Patient Information     Date Of Birth          1955        Visit Information        Provider Department      11/5/2018 11:20 AM Bhavin Khan MD [facility] [place]        Today's Diagnoses     Major depressive disorder, recurrent episode, moderate (H)    -  1    MOOKIE (generalized anxiety disorder)        Tobacco abuse        Tobacco abuse counseling          Care Instructions      HOW TO QUIT SMOKING  Smoking is one of the hardest habits to break. About half of all those who have ever smoked have been able to quit, and most of those (about 70%) who still smoke want to quit. Here are some of the best ways to stop smoking.     KEEP TRYING:  It takes most smokers about 8 tries before they are finally able to fully quit. So, the more often you try and fail, the better your chance of quitting the next time! So, don't give up!    GO COLD TURKEY:  Most ex-smokers quit cold turkey. Trying to cut back gradually doesn't seem to work as well, perhaps because it continues the smoking habit. Also, it is possible to fool yourself by inhaling more while smoking fewer cigarettes. This results in the same amount of nicotine in your body!    GET SUPPORT:  Support programs can make an important difference, especially for the heavy smoker. These groups offer lectures, methods to change your behavior and peer support. Call the free national Quitline for more information. 800-QUIT-NOW (675-029-2403). Low-cost or free programs are offered by many hospitals, local chapters of the American Lung Association (952-230-1321) and the American Cancer Society (593-211-2516). Support at home is important too. Non-smokers can help by offering praise and encouragement. If the smoker fails to quit, encourage them to try again!    OVER-THE-COUNTER MEDICINES:  For those who can't quit on their own, Nicotine Replacement Therapy (NRT) may  make quitting much easier. Certain aids such as the nicotine patch, gum and lozenge are available without a prescription. However, it is best to use these under the guidance of your doctor. The skin patch provides a steady supply of nicotine to the body. Nicotine gum and lozenge gives temporary bursts of low levels of nicotine. Both methods take the edge off the craving for cigarettes. WARNING: If you feel symptoms of nicotine overdose, such as nausea, vomiting, dizziness, weakness, or fast heartbeat, stop using these and see your doctor.    PRESCRIPTION MEDICINES:  After evaluating your smoking patterns and prior attempts at quitting, your doctor may offer a prescription medicine such as bupropion (Zyban, Wellbutrin), varenicline (Chantix, Champix), a niocotine inhaler or nasal spray. Each has its unique advantage and side effects which your doctor can review with you.    HEALTH BENEFITS OF QUITTING:  The benefits of quitting start right away and keep improving the longer you go without smokin minutes: blood pressure and pulse return to normal  8 hours: oxygen levels return to normal  2 days: ability to smell and taste begins to improve as damaged nerves start to regrow  2-3 weeks: circulation and lung function improves  1-9 months: decreased cough, congestion and shortness of breath; less tired  1 year: risk of heart attack decreases by half  5 years: risk of lung cancer decreases by half; risk of stroke becomes the same as a non-smoker  For information about how to quit smoking, visit the following links:  [facility] ,   Clearing the Air, Quit Smoking Today   - an online booklet. http://www.smokefree.gov/pubs/clearing_the_air.pdf  Smokefree.gov http://smokefree.gov/  QuitNet http://www.quitnet.com/    0610-0204 [employer], [address] [place] [address], [place] [address]. All rights reserved. This information is not intended as a substitute for professional medical care. Always follow your  healthcare professional's instructions.    The Benefits of Living Smoke Free  What do you want to gain from quitting? Check off some reasons to quit.  Health Benefits  ___ Reduce my risk of lung cancer, heart disease, chronic lung disease  ___ Have fewer wrinkles and softer skin  ___ Improve my sense of taste and smell  ___ For pregnant women--reduce the risk of having a miscarriage, stillbirth, premature birth, or low-birth-weight baby  Personal Benefits  ___ Feel more in control of my life  ___ Have better-smelling hair, breath, clothes, home, and car  ___ Save time by not having to take smoke breaks, buy cigarettes, or hunt for a light  ___ Have whiter teeth  Family Benefits  ___ Reduce my children s respiratory tract infections  ___ Set a good example for my children  ___ Reduce my family s cancer risk  Financial Benefits  ___ Save hundreds of dollars each year that would be spent on cigarettes  ___ Save money on medical bills  ___ Save on life, health, and car insurance premiums    Those Dollars Add Up!  Cigarettes are expensive, and getting more expensive all the time. Do you realize how much money you are spending on cigarettes per year? What is the average amount you spend on a pack of cigarettes? What is the average number of packs that you smoke per day? Using your answers to these questions, fill in this formula to help you find out:  ($ _____ per pack) ×  ( _____ number of packs per day) × (365 days) =  $ _____ yearly cost of smoking  Besides tobacco, there are other costs, including extra cleaning bills and replacement costs for clothing and furniture; medical expenses for smoking-related illnesses; and higher health, life, and car insurance premiums.    Cigars and Pipes Count Too!  Cigars and pipes are also dangerous. So are smokeless (chewing) tobacco and snuff. All of these products contain nicotine, a highly addictive substance that has harmful effects on your body. Quitting smoking means giving up  all tobacco products.      8734-7112 [facility], [address] [place] [address], [place] [address]. All rights reserved. This information is not intended as a substitute for professional medical care. Always follow your healthcare professional's instructions.          Follow-ups after your visit        Follow-up notes from your care team     Return in about 3 months (around 2/5/2019) for Comprehensive Exam.      Who to contact     If you have questions or need follow up information about today's clinic visit or your schedule please contact [facility] [place] directly at [phone].  Normal or non-critical lab and imaging results will be communicated to you by Dotted Blockhart, letter or phone within 4 business days after the clinic has received the results. If you do not hear from us within 7 days, please contact the clinic through Dotted Blockhart or phone. If you have a critical or abnormal lab result, we will notify you by phone as soon as possible.  Submit refill requests through Vigix or call your pharmacy and they will forward the refill request to us. Please allow 3 business days for your refill to be completed.          Additional Information About Your Visit        MyChart Information     [employer] gives you secure access to your electronic health record. If you see a primary care provider, you can also send messages to your care team and make appointments. If you have questions, please call your primary care clinic.  If you do not have a primary care provider, please call [phone] and they will assist you.        Care EveryWhere ID     This is your Care EveryWhere ID. This could be used by other organizations to access your [facility] medical records  [id number]        Your Vitals Were     Pulse Temperature Pulse Oximetry BMI (Body Mass Index)          77 99.2  F (37.3  C) (Tympanic) 98% 27.54 kg/m2         Blood Pressure from Last 3 Encounters:   11/05/18 128/70   05/15/18 136/74   04/27/18 122/62     Weight from Last 3 Encounters:   11/05/18 141 lb (64 kg)   05/15/18 132 lb (59.9 kg)   04/27/18 132 lb (59.9 kg)              We Performed the Following     Tobacco Cessation - Order to Satisfy Health Maintenance          Today's Medication Changes          These changes are accurate as of 11/5/18 11:59 PM.  If you have any questions, ask your nurse or doctor.               Stop taking these medicines if you haven't already. Please contact your care team if you have questions.     clindamycin 150 MG capsule   Commonly known as:  CLEOCIN   Stopped by:  Bhavin Khan MD           metroNIDAZOLE 0.75 % vaginal gel   Commonly known as:  METROGEL   Stopped by:  Bhavin Khan MD                    Primary Care Provider Office Phone # Fax #    Bhavin Khan [phone] [address] [address]        Equal Access to Services     : Dragan Gleason, waaxsourav coffey, qaybadolfo kaalmasourav lazaro, edwin cotton . [facility] [phone].    ATENCIÓN: Si habla español, tiene a marshall disposición servicios gratuitos de asistencia lingüística. [facility] [phone].    We comply with applicable federal civil rights laws and [place] laws. We do not discriminate on the basis of race, color, national origin, age, disability, sex, sexual orientation, or gender identity.            Thank you!     Thank you for choosing [facility]  for your care. Our goal is always to provide you with excellent care. Hearing back from our patients is one way we can continue to improve our services. Please take a few minutes to complete the written survey that you may receive in the mail after your visit with us. Thank you!             Your Updated Medication List - Protect others around you: Learn how to safely use, store and throw away your medicines at www.disposemymeds.org.          This list is accurate as of 11/5/18 11:59 PM.   Always use your most recent med list.                   Brand Name Dispense Instructions for use Diagnosis    clonazePAM 0.5 MG tablet    klonoPIN    60 tablet    TAKE ONE-HALF TO ONE TABLET BY MOUTH TWICE DAILY AS NEEDED FOR ANXIETY    MOOKIE (generalized anxiety disorder)       FLUoxetine 20 MG capsule    PROzac    30 capsule    TAKE 1 CAPSULE BY MOUTH ONCE DAILY    Major depressive disorder, recurrent episode, moderate (H)

## 2018-11-05 NOTE — LETTER
My Depression Action Plan  Name: Mechelle Fong   Date of Birth 1955  Date: 11/5/2018    My doctor: Bhavin Khan   My clinic: [facility] [place]  [place] [address]  [phone]          GREEN    ZONE   Good Control    What it looks like:     Things are going generally well. You have normal up s and down s. You may even feel depressed from time to time, but bad moods usually last less than a day.   What you need to do:  1. Continue to care for yourself (see self care plan)  2. Check your depression survival kit and update it as needed  3. Follow your physician s recommendations including any medication.  4. Do not stop taking medication unless you consult with your physician first.           YELLOW         ZONE Getting Worse    What it looks like:     Depression is starting to interfere with your life.     It may be hard to get out of bed; you may be starting to isolate yourself from others.    Symptoms of depression are starting to last most all day and this has happened for several days.     You may have suicidal thoughts but they are not constant.   What you need to do:     1. Call your care team, your response to treatment will improve if you keep your care team informed of your progress. Yellow periods are signs an adjustment may need to be made.     2. Continue your self-care, even if you have to fake it!    3. Talk to someone in your support network    4. Open up your depression survival kit           RED    ZONE Medical Alert - Get Help    What it looks like:     Depression is seriously interfering with your life.     You may experience these or other symptoms: You can t get out of bed most days, can t work or engage in other necessary activities, you have trouble taking care of basic hygiene, or basic responsibilities, thoughts of suicide or death that will not go away, self-injurious behavior.     What you need to do:  1. Call your care team and request  a same-day appointment. If they are not available (weekends or after hours) call your local crisis line, emergency room or 911.            Depression Self Care Plan / Survival Kit    Self-Care for Depression  Here s the deal. Your body and mind are really not as separate as most people think.  What you do and think affects how you feel and how you feel influences what you do and think. This means if you do things that people who feel good do, it will help you feel better.  Sometimes this is all it takes.  There is also a place for medication and therapy depending on how severe your depression is, so be sure to consult with your medical provider and/ or Behavioral Health Consultant if your symptoms are worsening or not improving.     In order to better manage my stress, I will:    Exercise  Get some form of exercise, every day. This will help reduce pain and release endorphins, the  feel good  chemicals in your brain. This is almost as good as taking antidepressants!  This is not the same as joining a gym and then never going! (they count on that by the way ) It can be as simple as just going for a walk or doing some gardening, anything that will get you moving.      Hygiene   Maintain good hygiene (Get out of bed in the morning, Make your bed, Brush your teeth, Take a shower, and Get dressed like you were going to work, even if you are unemployed).  If your clothes don't fit try to get ones that do.    Diet  I will strive to eat foods that are good for me, drink plenty of water, and avoid excessive sugar, caffeine, alcohol, and other mood-altering substances.  Some foods that are helpful in depression are: complex carbohydrates, B vitamins, flaxseed, fish or fish oil, fresh fruits and vegetables.    Psychotherapy  I agree to participate in Individual Therapy (if recommended).    Medication  If prescribed medications, I agree to take them.  Missing doses can result in serious side effects.  I understand that drinking  alcohol, or other illicit drug use, may cause potential side effects.  I will not stop my medication abruptly without first discussing it with my provider.    Staying Connected With Others  I will stay in touch with my friends, family members, and my primary care provider/team.    Use your imagination  Be creative.  We all have a creative side; it doesn t matter if it s oil painting, sand castles, or mud pies! This will also kick up the endorphins.    Witness Beauty  (AKA stop and smell the roses) Take a look outside, even in mid-winter. Notice colors, textures. Watch the squirrels and birds.     Service to others  Be of service to others.  There is always someone else in need.  By helping others we can  get out of ourselves  and remember the really important things.  This also provides opportunities for practicing all the other parts of the program.    Humor  Laugh and be silly!  Adjust your TV habits for less news and crime-drama and more comedy.    Control your stress  Try breathing deep, massage therapy, biofeedback, and meditation. Find time to relax each day.     My support system    Clinic Contact:  Phone number:    Contact 1:  Phone number:    Contact 2:  Phone number:    Taoism/:  Phone number:    Therapist:  Phone number:    Local crisis center:    Phone number:    Other community support:  Phone number:

## 2018-11-06 ASSESSMENT — ANXIETY QUESTIONNAIRES: GAD7 TOTAL SCORE: 4

## 2018-12-20 DIAGNOSIS — F41.1 GAD (GENERALIZED ANXIETY DISORDER): ICD-10-CM

## 2018-12-21 RX ORDER — CLONAZEPAM 0.5 MG/1
TABLET ORAL
Qty: 60 TABLET | Refills: 0 | Status: SHIPPED | OUTPATIENT
Start: 2018-12-21 | End: 2019-09-24

## 2018-12-21 NOTE — TELEPHONE ENCOUNTER
clonazePAM (KLONOPIN) 0.5 MG tablet      Last Written Prescription Date:  03/27/2018  Last Fill Quantity: 60,   # refills: 0  Last Office Visit: 11/05/2018  Future Office visit:       Routing refill request to provider for review/approval because:  Drug not on the [facility], P or [facility] refill protocol or controlled substance

## 2018-12-23 ENCOUNTER — HOSPITAL ENCOUNTER (EMERGENCY)
Facility: HOSPITAL | Age: 63
Discharge: HOME | End: 2018-12-23
Attending: NURSE PRACTITIONER | Admitting: NURSE PRACTITIONER
Payer: COMMERCIAL

## 2018-12-23 VITALS
HEART RATE: 73 BPM | HEIGHT: 61 IN | RESPIRATION RATE: 14 BRPM | WEIGHT: 138 LBS | DIASTOLIC BLOOD PRESSURE: 70 MMHG | SYSTOLIC BLOOD PRESSURE: 152 MMHG | BODY MASS INDEX: 26.06 KG/M2 | OXYGEN SATURATION: 97 % | TEMPERATURE: 98.8 F

## 2018-12-23 DIAGNOSIS — J06.9 UPPER RESPIRATORY TRACT INFECTION, UNSPECIFIED TYPE: ICD-10-CM

## 2018-12-23 LAB
DEPRECATED S PYO AG THROAT QL EIA: NORMAL
SPECIMEN SOURCE: NORMAL

## 2018-12-23 PROCEDURE — 99213 OFFICE O/P EST LOW 20 MIN: CPT | Performed by: NURSE PRACTITIONER

## 2018-12-23 PROCEDURE — G0463 HOSPITAL OUTPT CLINIC VISIT: HCPCS

## 2018-12-23 PROCEDURE — 87081 CULTURE SCREEN ONLY: CPT | Performed by: FAMILY MEDICINE

## 2018-12-23 PROCEDURE — 87880 STREP A ASSAY W/OPTIC: CPT | Performed by: FAMILY MEDICINE

## 2018-12-23 ASSESSMENT — ENCOUNTER SYMPTOMS
SINUS PAIN: 0
HEADACHES: 1
SORE THROAT: 1
ABDOMINAL PAIN: 0
DYSURIA: 0
FEVER: 0
ARTHRALGIAS: 0
DIARRHEA: 0
VOMITING: 0
NAUSEA: 0
SHORTNESS OF BREATH: 0
SINUS PRESSURE: 0
COUGH: 1
MYALGIAS: 0
APPETITE CHANGE: 0
CHILLS: 1

## 2018-12-23 ASSESSMENT — MIFFLIN-ST. JEOR: SCORE: 1118.34

## 2018-12-23 NOTE — ED PROVIDER NOTES
History     Chief Complaint   Patient presents with     Pharyngitis     Since last night with recent strep exposure     HPI  Mechelle Fong is a 63 year old female who presents today with a CC of sore throat, sinus drainage, nasal congestion, ears plugged.  She states she had viral type symptoms last week with body aches, headache and started to get better, now is worsening again.  No measured fevers but she has been feeling chills.  Appetite has been good.  She has been drinking plenty of fluids.  She took ibuprofen for pain, is helping with other aches and headache but not with throat.  She was exposed to strep throat last week by her grand son.     Problem List:    Patient Active Problem List    Diagnosis Date Noted     MOOKIE (generalized anxiety disorder) 05/14/2016     Priority: Medium     ACP (advance care planning) 05/11/2016     Priority: Medium     Advance Care Planning 5/11/2016: ACP Review of Chart / Resources Provided:  Reviewed chart for advance care plan.  Mechelle Fong has no plan or code status on file. She declined paperwork at this time.  Added by Roshan Hunter             Major depressive disorder, recurrent episode, moderate (H) 05/13/2013     Priority: Medium        Past Medical History:    Past Medical History:   Diagnosis Date     Depression 10/09/2001     Dysthymic disorder 10/22/2001       Past Surgical History:    Past Surgical History:   Procedure Laterality Date     breast biopsy      RT     COLONOSCOPY       DILATION AND CURETTAGE      D&C     EGD       excision      ovarian tumor, LT     HYSTERECTOMY VAGINAL Left     vaginal hysterectomy /LSO [Other]     RELEASE TRIGGER FINGER      RT     SALPINGO OOPHORECTOMY,R/L/DANIEL Left     LSO Left       Family History:    Family History   Problem Relation Age of Onset     Cerebrovascular Disease Father      Heart Disease Father 50        Heart failure, cause of death/Myocardial infarction     Cancer - colorectal Maternal Grandmother      Cancer  "Mother         Lung     Hypertension Brother        Social History:  Marital Status:   [2]  Social History     Tobacco Use     Smoking status: Current Some Day Smoker     Types: Cigarettes     Smokeless tobacco: Never Used     Tobacco comment: tried to quit-yes   Substance Use Topics     Alcohol use: Yes     Comment: occasionally     Drug use: No        Medications:      clonazePAM (KLONOPIN) 0.5 MG tablet   FLUoxetine (PROZAC) 20 MG capsule         Review of Systems   Constitutional: Positive for chills. Negative for appetite change and fever.   HENT: Positive for congestion, ear pain (congested) and sore throat. Negative for sinus pressure and sinus pain.    Respiratory: Positive for cough (mild). Negative for shortness of breath.    Gastrointestinal: Negative for abdominal pain, diarrhea, nausea and vomiting.   Genitourinary: Negative for dysuria.   Musculoskeletal: Negative for arthralgias and myalgias.   Skin: Negative for rash.   Neurological: Positive for headaches (3/10 occipital area).       Physical Exam   BP: 152/70  Pulse: 73  Temp: 98.8  F (37.1  C)  Resp: 14  Height: 154.9 cm (5' 1\")  Weight: 62.6 kg (138 lb)  SpO2: 97 %      Physical Exam   Constitutional: Vital signs are normal. She appears well-developed. She is cooperative. She does not appear ill.   HENT:   Head: Normocephalic and atraumatic.   Right Ear: Tympanic membrane, external ear and ear canal normal.   Left Ear: Tympanic membrane, external ear and ear canal normal.   Nose: Nose normal.   Mouth/Throat: Uvula is midline. Posterior oropharyngeal edema (mild) and posterior oropharyngeal erythema (mild) present.   Eyes: Conjunctivae are normal.   Neck: Normal range of motion. Neck supple.   Cardiovascular: Normal rate and regular rhythm.   Pulmonary/Chest: Effort normal and breath sounds normal.   Musculoskeletal: Normal range of motion.   Neurological: She is alert.   Skin: Skin is warm and dry.   Psychiatric: She has a normal mood and " "affect. Her behavior is normal.   Nursing note and vitals reviewed.      ED Course        Procedures    Results for orders placed or performed during the hospital encounter of 12/23/18   Rapid strep screen   Result Value Ref Range    Specimen Description Throat     Rapid Strep A Screen       NEGATIVE: No Group A streptococcal antigen detected by immunoassay, await culture report.   Beta strep group A culture   Result Value Ref Range    Specimen Description Throat     Culture Micro Culture negative monitoring continues        Assessments & Plan (with Medical Decision Making)     I have reviewed the nursing notes.    I have reviewed the findings, diagnosis, plan and need for follow up with the patient.  ASSESSMENT / PLAN:  (J06.9) Upper respiratory tract infection, unspecified type  Comment: rapid strep neg, culture pending  Plan:  The rapid strep was negative, the strep culture is pending, we will call you with positive results only and treat with antibiotics as needed   Warm salt water gargles several times per day   Ibuprofen and tylenol per package directions for pain/fever   Cepacol lozenges OTC per package directions   Increase fluids, wash hands frequently, rest   Patient verbally educated and given appropriate education sheets for their diagnoses and has no questions.   Return to ED/UC if symptoms increase or concerns develop, red flag symptoms as discussed and per discharge instructions, increasing throat pain, trouble swallowing,  \"hot potato voice\", drooling, shortness of breath/airway compromise   Follow up with your Primary Care provider if symptoms do not improve in 2-3 days         Medication List      There are no discharge medications for this visit.         Final diagnoses:   Upper respiratory tract infection, unspecified type       12/23/2018   [facility]     Alena Carrillo NP  12/24/18 [id number]    "

## 2018-12-23 NOTE — ED AVS SNAPSHOT
[facility]  [address]  [place] [address]  Phone:  [phone]                                    Mechelle Fong   MRN: [id number]    Department:  [facility]   Date of Visit:  12/23/2018           After Visit Summary Signature Page    I have received my discharge instructions, and my questions have been answered. I have discussed any challenges I see with this plan with the nurse or doctor.    ..........................................................................................................................................  Patient/Patient Representative Signature      ..........................................................................................................................................  Patient Representative Print Name and Relationship to Patient    ..................................................               ................................................  Date                                   Time    ..........................................................................................................................................  Reviewed by Signature/Title    ...................................................              ..............................................  Date                                               Time          22EPIC Rev 08/18

## 2018-12-24 ENCOUNTER — HOSPITAL ENCOUNTER (EMERGENCY)
Facility: HOSPITAL | Age: 63
Discharge: HOME | End: 2018-12-24
Attending: PHYSICIAN ASSISTANT | Admitting: PHYSICIAN ASSISTANT
Payer: COMMERCIAL

## 2018-12-24 VITALS
HEART RATE: 61 BPM | RESPIRATION RATE: 16 BRPM | SYSTOLIC BLOOD PRESSURE: 149 MMHG | DIASTOLIC BLOOD PRESSURE: 81 MMHG | TEMPERATURE: 97.9 F | OXYGEN SATURATION: 98 %

## 2018-12-24 DIAGNOSIS — R21 RASH: ICD-10-CM

## 2018-12-24 DIAGNOSIS — J02.9 SORE THROAT: ICD-10-CM

## 2018-12-24 LAB
DEPRECATED S PYO AG THROAT QL EIA: NORMAL
SPECIMEN SOURCE: NORMAL

## 2018-12-24 PROCEDURE — G0463 HOSPITAL OUTPT CLINIC VISIT: HCPCS

## 2018-12-24 PROCEDURE — 99213 OFFICE O/P EST LOW 20 MIN: CPT | Performed by: PHYSICIAN ASSISTANT

## 2018-12-24 PROCEDURE — 87081 CULTURE SCREEN ONLY: CPT | Performed by: PHYSICIAN ASSISTANT

## 2018-12-24 PROCEDURE — 87880 STREP A ASSAY W/OPTIC: CPT | Performed by: PHYSICIAN ASSISTANT

## 2018-12-24 ASSESSMENT — ENCOUNTER SYMPTOMS
APPETITE CHANGE: 0
COUGH: 0
EYE REDNESS: 0
NAUSEA: 0
EYE DISCHARGE: 0
FATIGUE: 0
HEADACHES: 0
NECK PAIN: 0
FEVER: 0
LIGHT-HEADEDNESS: 0
NECK STIFFNESS: 0
CARDIOVASCULAR NEGATIVE: 1
TROUBLE SWALLOWING: 0
VOMITING: 0
VOICE CHANGE: 0
PSYCHIATRIC NEGATIVE: 1
DIARRHEA: 0
ABDOMINAL PAIN: 0
DIZZINESS: 0
SORE THROAT: 1
SINUS PRESSURE: 0

## 2018-12-24 NOTE — ED AVS SNAPSHOT
[facility]  [address]  [place] [address]  Phone:  [phone]                                    Mechelle Fong   MRN: [id number]    Department:  [facility]   Date of Visit:  12/24/2018           After Visit Summary Signature Page    I have received my discharge instructions, and my questions have been answered. I have discussed any challenges I see with this plan with the nurse or doctor.    ..........................................................................................................................................  Patient/Patient Representative Signature      ..........................................................................................................................................  Patient Representative Print Name and Relationship to Patient    ..................................................               ................................................  Date                                   Time    ..........................................................................................................................................  Reviewed by Signature/Title    ...................................................              ..............................................  Date                                               Time          22EPIC Rev 08/18

## 2018-12-25 LAB
BACTERIA SPEC CULT: NORMAL
SPECIMEN SOURCE: NORMAL

## 2018-12-25 NOTE — ED TRIAGE NOTES
Pt presents today with c/o rash to all over body this morning. Took benadryl 30 minutes ago. 50 mg.

## 2018-12-25 NOTE — ED TRIAGE NOTES
Reports URI symptoms with sore throat for a week. Seen in urgent care yesterday. States today developed a rash on the lateral torso spreading to the groin area. Reports the rash burns.

## 2018-12-25 NOTE — ED PROVIDER NOTES
History     Chief Complaint   Patient presents with     Rash     The history is provided by the patient. No  was used.     Mechelle Fong is a 63 year old female who has one day of a rash under arms and in creases of her groin. States it doesn't really itch. It has a mild burning. Pt was seen 2 days ago for the sore throat she still has x 1 week. No ear pain. No sinus pain/pressure. No n/v/d/f/c    Problem List:    Patient Active Problem List    Diagnosis Date Noted     MOOKIE (generalized anxiety disorder) 05/14/2016     Priority: Medium     ACP (advance care planning) 05/11/2016     Priority: Medium     Advance Care Planning 5/11/2016: ACP Review of Chart / Resources Provided:  Reviewed chart for advance care plan.  Mechelle Fong has no plan or code status on file. She declined paperwork at this time.  Added by Roshan Hunter             Major depressive disorder, recurrent episode, moderate (H) 05/13/2013     Priority: Medium        Past Medical History:    Past Medical History:   Diagnosis Date     Depression 10/09/2001     Dysthymic disorder 10/22/2001       Past Surgical History:    Past Surgical History:   Procedure Laterality Date     breast biopsy      RT     COLONOSCOPY       DILATION AND CURETTAGE      D&C     EGD       excision      ovarian tumor, LT     HYSTERECTOMY VAGINAL Left     vaginal hysterectomy /LSO [Other]     RELEASE TRIGGER FINGER      RT     SALPINGO OOPHORECTOMY,R/L/DANIEL Left     LSO Left       Family History:    Family History   Problem Relation Age of Onset     Cerebrovascular Disease Father      Heart Disease Father 50        Heart failure, cause of death/Myocardial infarction     Cancer - colorectal Maternal Grandmother      Cancer Mother         Lung     Hypertension Brother        Social History:  Marital Status:   [2]  Social History     Tobacco Use     Smoking status: Current Some Day Smoker     Types: Cigarettes     Smokeless tobacco: Never Used      Tobacco comment: tried to quit-yes   Substance Use Topics     Alcohol use: Yes     Comment: occasionally     Drug use: No        Medications:      FLUoxetine (PROZAC) 20 MG capsule   clonazePAM (KLONOPIN) 0.5 MG tablet         Review of Systems   Constitutional: Negative for appetite change, fatigue and fever.   HENT: Positive for sore throat. Negative for congestion, ear pain, sinus pressure, trouble swallowing and voice change.    Eyes: Negative for discharge and redness.   Respiratory: Negative for cough.    Cardiovascular: Negative.    Gastrointestinal: Negative for abdominal pain, diarrhea, nausea and vomiting.   Genitourinary: Negative.    Musculoskeletal: Negative for neck pain and neck stiffness.   Skin: Positive for rash.   Neurological: Negative for dizziness, light-headedness and headaches.   Psychiatric/Behavioral: Negative.        Physical Exam   BP: 149/81  Pulse: 61  Temp: 97.9  F (36.6  C)  Resp: 16  SpO2: 98 %      Physical Exam   Constitutional: She is oriented to person, place, and time. She appears well-developed and well-nourished. No distress.   HENT:   Head: Normocephalic and atraumatic.   Right Ear: External ear normal.   Left Ear: External ear normal.   Mouth/Throat: Oropharynx is clear and moist.   Bilateral TMs/canals clear/wnl  No sinus TTP     Eyes: Conjunctivae and EOM are normal. Right eye exhibits no discharge. Left eye exhibits no discharge.   Neck: Normal range of motion. Neck supple.   Cardiovascular: Normal rate, regular rhythm and normal heart sounds.   Pulmonary/Chest: Effort normal and breath sounds normal. No respiratory distress.   Abdominal: Soft. Bowel sounds are normal. She exhibits no distension. There is no tenderness.   Neurological: She is alert and oriented to person, place, and time.   Skin: Skin is warm and dry. Rash noted. She is not diaphoretic.   Erythematous papules in bilateral axilla. No vesicles/pustules. Pt declines having me examine her groin   Psychiatric:  She has a normal mood and affect.   Nursing note and vitals reviewed.      ED Course        Procedures               Results for orders placed or performed during the hospital encounter of 12/24/18 (from the past 24 hour(s))   Rapid strep screen   Result Value Ref Range    Specimen Description Throat     Rapid Strep A Screen       NEGATIVE: No Group A streptococcal antigen detected by immunoassay, await culture report.       Medications - No data to display    Assessments & Plan (with Medical Decision Making)     I have reviewed the nursing notes.    I have reviewed the findings, diagnosis, plan and need for follow up with the patient.         Medication List      There are no discharge medications for this visit.         Final diagnoses:   Sore throat - rapid strep negative, culture pending   Rash         Pt wishes to wait and observe the rash.  I educated her on bendaryl and zyrtec OTC.    Increase fluids, wash hands often  Patient verbally educated and given appropriate education sheets for the diagnoses and has no questions.   Follow up with your Primary Care provider if symptoms increase or if further concerns develop, return to the ER  Rach Sierra Certified  Physician Assistant  12/24/2018  9:29 PM  URGENT CARE CLINIC      12/24/2018   [facility]     Rach Sierra PA  12/24/18 2129       Rach Sierra PA  12/24/18 2134

## 2018-12-25 NOTE — DISCHARGE INSTRUCTIONS
Continue the OTC benadryl as directed on the bottle as needed.  You can also add OTC Zyrtec 10 mg 1 tablet daily as needed.

## 2018-12-26 LAB
BACTERIA SPEC CULT: NORMAL
SPECIMEN SOURCE: NORMAL

## 2019-08-23 DIAGNOSIS — F33.1 MAJOR DEPRESSIVE DISORDER, RECURRENT EPISODE, MODERATE (H): ICD-10-CM

## 2019-08-26 NOTE — TELEPHONE ENCOUNTER
FLUoxetine (PROZAC) 20 MG capsule     Last Written Prescription Date:  2/21/19  Last Fill Quantity: 90,   # refills: 1  Last Office Visit: 11/5/18  Future Office visit:

## 2019-08-28 NOTE — TELEPHONE ENCOUNTER
Patient made an appointment for her yearly physical but can not be seen until 10/2/2019. Hoping to get refills until then.

## 2019-09-24 DIAGNOSIS — F41.1 GAD (GENERALIZED ANXIETY DISORDER): ICD-10-CM

## 2019-09-24 RX ORDER — CLONAZEPAM 0.5 MG/1
TABLET ORAL
Qty: 60 TABLET | Refills: 0 | Status: SHIPPED | OUTPATIENT
Start: 2019-09-24 | End: 2020-03-17

## 2019-09-24 NOTE — TELEPHONE ENCOUNTER
Not on protocol, please advise,    clonazePAM (KLONOPIN) 0.5 MG tablet      Last Written Prescription Date:  12/21/18  Last Fill Quantity: 60,   # refills: 0  Last Office Visit: 11/5/18  Future Office visit:    Next 5 appointments (look out 90 days)    Oct 02, 2019  9:00 AM CDT  (Arrive by 8:45 AM)  PHYSICAL with Bhavin Khan MD  [facility] [address]  [place] [address]  [phone]           Routing refill request to provider for review/approval because:  Drug not on the [facility], P or  Health refill protocol or controlled substance

## 2019-09-26 NOTE — PROGRESS NOTES
SUBJECTIVE:   CC: Mechelle Fong is an 64 year old woman who presents for preventive health visit.     Healthy Habits:    Do you get at least three servings of calcium containing foods daily (dairy, green leafy vegetables, etc.)? yes    Amount of exercise or daily activities, outside of work: 5-6 day(s) per week    Problems taking medications regularly No    Medication side effects: No    Have you had an eye exam in the past two years? yes    Do you see a dentist twice per year? yes    Do you have sleep apnea, excessive snoring or daytime drowsiness?no    Today's PHQ-2 Score:   PHQ-2 (  [employer]) 10/2/2019 5/10/2013   Q1: Little interest or pleasure in doing things 0 0   Q2: Feeling down, depressed or hopeless 0 1   PHQ-2 Score 0 1       Abuse: Current or Past(Physical, Sexual or Emotional)- No  Do you feel safe in your environment? Yes    Social History     Tobacco Use     Smoking status: Former Smoker     Packs/day: 0.50     Years: 30.00     Pack years: 15.00     Types: Cigarettes     Start date: 1975     Last attempt to quit: 2019     Years since quittin.1     Smokeless tobacco: Never Used     Tobacco comment: quit on and off through the years   Substance Use Topics     Alcohol use: Yes     Comment: occasionally     If you drink alcohol do you typically have >3 drinks per day or >7 drinks per week? No                     Reviewed orders with patient.  Reviewed health maintenance and updated orders accordingly - Yes  BP Readings from Last 3 Encounters:   10/02/19 122/72   18 149/81   18 152/70    Wt Readings from Last 3 Encounters:   10/02/19 61.7 kg (136 lb)   18 62.6 kg (138 lb)   18 64 kg (141 lb)                  Patient Active Problem List   Diagnosis     Major depressive disorder, recurrent episode, moderate (H)     ACP (advance care planning)     MOOKIE (generalized anxiety disorder)     Past Surgical History:   Procedure Laterality Date     breast biopsy      RT      COLONOSCOPY       DILATION AND CURETTAGE      D&C     EGD       excision      ovarian tumor, LT     HYSTERECTOMY VAGINAL Left     vaginal hysterectomy /LSO [Other]     RELEASE TRIGGER FINGER      RT     SALPINGO OOPHORECTOMY,R/L/DANIEL Left     LSO Left       Social History     Tobacco Use     Smoking status: Former Smoker     Packs/day: 0.50     Years: 30.00     Pack years: 15.00     Types: Cigarettes     Start date: 1975     Last attempt to quit: 2019     Years since quittin.1     Smokeless tobacco: Never Used     Tobacco comment: quit on and off through the years   Substance Use Topics     Alcohol use: Yes     Comment: occasionally     Family History   Problem Relation Age of Onset     Cerebrovascular Disease Father      Heart Disease Father 50        Heart failure, cause of death/Myocardial infarction     Cancer - colorectal Maternal Grandmother      Cancer Mother         Lung     Hypertension Brother          Current Outpatient Medications   Medication Sig Dispense Refill     clonazePAM (KLONOPIN) 0.5 MG tablet TAKE 1/2 TO 1 (ONE-HALF TO ONE) TABLET BY MOUTH TWICE DAILY AS NEEDED 60 tablet 0     FLUoxetine (PROZAC) 20 MG capsule TAKE 1 CAPSULE BY MOUTH ONCE DAILY 30 capsule 0     Allergies   Allergen Reactions     Morphine Other (See Comments)     Hypotension      Paxil [Paroxetine Mesylate] Other (See Comments)     Intolerance  Paroxetine HCL           Mammogram Screening: Patient over age 50, mutual decision to screen reflected in health maintenance.    Pertinent mammograms are reviewed under the imaging tab.  History of abnormal Pap smear: NO - age 30-65 PAP every 5 years with negative HPV co-testing recommended     Reviewed and updated as needed this visit by clinical staff  Tobacco  Allergies  Meds  Problems  Med Hx  Surg Hx  Fam Hx         Reviewed and updated as needed this visit by Provider        Past Medical History:   Diagnosis Date     Depression 10/09/2001     Dysthymic disorder  "10/22/2001      Past Surgical History:   Procedure Laterality Date     breast biopsy      RT     COLONOSCOPY       DILATION AND CURETTAGE      D&C     EGD       excision      ovarian tumor, LT     HYSTERECTOMY VAGINAL Left     vaginal hysterectomy /LSO [Other]     RELEASE TRIGGER FINGER      RT     SALPINGO OOPHORECTOMY,R/L/DANIEL Left     LSO Left       ROS:  CONSTITUTIONAL: NEGATIVE for fever, chills, change in weight  INTEGUMENTARY/SKIN: NEGATIVE for worrisome rashes, moles or lesions  EYES: NEGATIVE for vision changes or irritation  ENT: NEGATIVE for ear, mouth and throat problems  RESP: NEGATIVE for significant cough or SOB  BREAST: NEGATIVE for masses, tenderness or discharge  CV: NEGATIVE for chest pain, palpitations or peripheral edema  GI: NEGATIVE for nausea, abdominal pain, heartburn, or change in bowel habits  : NEGATIVE for unusual urinary or vaginal symptoms. No vaginal bleeding.  MUSCULOSKELETAL: NEGATIVE for significant arthralgias or myalgia  NEURO: NEGATIVE for weakness, dizziness or paresthesias  PSYCHIATRIC: NEGATIVE for changes in mood or affect     OBJECTIVE:   /72 (BP Location: Right arm, Patient Position: Sitting, Cuff Size: Adult Regular)   Pulse 82   Temp 98.8  F (37.1  C) (Tympanic)   Resp 16   Ht 1.549 m (5' 1\")   Wt 61.7 kg (136 lb)   SpO2 96%   BMI 25.70 kg/m    EXAM:  GENERAL APPEARANCE: healthy, alert and no distress  EYES: Eyes grossly normal to inspection, PERRL and conjunctivae and sclerae normal  HENT: ear canals and TM's normal, nose and mouth without ulcers or lesions, oropharynx clear and oral mucous membranes moist  NECK: no adenopathy, no asymmetry, masses, or scars and thyroid normal to palpation  RESP: lungs clear to auscultation - no rales, rhonchi or wheezes  BREAST: normal without masses, tenderness or nipple discharge and no palpable axillary masses or adenopathy  CV: regular rate and rhythm, normal S1 S2, no S3 or S4, no murmur, click or rub, no peripheral " "edema and peripheral pulses strong  ABDOMEN: soft, nontender, no hepatosplenomegaly, no masses and bowel sounds normal  MS: no musculoskeletal defects are noted and gait is age appropriate without ataxia  SKIN: no suspicious lesions or rashes  NEURO: Normal strength and tone, sensory exam grossly normal, mentation intact and speech normal  PSYCH: mentation appears normal and affect normal/bright    Diagnostic Test Results:  Labs reviewed in Epic    ASSESSMENT/PLAN:   1. Routine general medical examination at a health care facility  General medical exam completed.  Breast exam performed.  Pap and pelvic exam deferred. Mammogram recommended. Screening labs drawn.  Colonoscopy and immunizations reviewed.  Discussed the importance of monthly breast self exam, aspirin use, and osteoporosis prevention . Follow up 1 year.    - CBC with platelets differential  - Comprehensive metabolic panel  - Lipid Profile    2. MOOKIE (generalized anxiety disorder)  TSH drawn  - TSH with free T4 reflex    3. Immunization due  Immunizations updated  - HC ZOSTER VACCINE RECOMBINANT ADJUVANTED IM NJX  - VACCINE ADMINISTRATION, INITIAL  - VACCINE ADMINISTRATION, EACH ADDITIONAL    COUNSELING:   Reviewed preventive health counseling, as reflected in patient instructions  Special attention given to:        Regular exercise       Healthy diet/nutrition    Estimated body mass index is 25.7 kg/m  as calculated from the following:    Height as of this encounter: 1.549 m (5' 1\").    Weight as of this encounter: 61.7 kg (136 lb).         reports that she quit smoking about 2 months ago. Her smoking use included cigarettes. She started smoking about 44 years ago. She has a 15.00 pack-year smoking history. She has never used smokeless tobacco.      Counseling Resources:  ATP IV Guidelines  Pooled Cohorts Equation Calculator  Breast Cancer Risk Calculator  FRAX Risk Assessment  ICSI Preventive Guidelines  Dietary Guidelines for Americans, 2010  USDA's " MyPlate  ASA Prophylaxis  Lung CA Screening    Bhavin Khan MD  [facility] [place]

## 2019-10-02 ENCOUNTER — OFFICE VISIT (OUTPATIENT)
Dept: FAMILY MEDICINE | Facility: OTHER | Age: 64
End: 2019-10-02
Attending: FAMILY MEDICINE
Payer: COMMERCIAL

## 2019-10-02 VITALS
SYSTOLIC BLOOD PRESSURE: 122 MMHG | RESPIRATION RATE: 16 BRPM | OXYGEN SATURATION: 96 % | TEMPERATURE: 98.8 F | DIASTOLIC BLOOD PRESSURE: 72 MMHG | WEIGHT: 136 LBS | BODY MASS INDEX: 25.68 KG/M2 | HEIGHT: 61 IN | HEART RATE: 82 BPM

## 2019-10-02 DIAGNOSIS — F41.1 GAD (GENERALIZED ANXIETY DISORDER): ICD-10-CM

## 2019-10-02 DIAGNOSIS — Z00.00 ROUTINE GENERAL MEDICAL EXAMINATION AT A HEALTH CARE FACILITY: Primary | ICD-10-CM

## 2019-10-02 DIAGNOSIS — Z23 IMMUNIZATION DUE: ICD-10-CM

## 2019-10-02 LAB
ALBUMIN SERPL-MCNC: 3.9 G/DL (ref 3.4–5)
ALP SERPL-CCNC: 65 U/L (ref 40–150)
ALT SERPL W P-5'-P-CCNC: 16 U/L (ref 0–50)
ANION GAP SERPL CALCULATED.3IONS-SCNC: 6 MMOL/L (ref 3–14)
AST SERPL W P-5'-P-CCNC: 13 U/L (ref 0–45)
BASOPHILS # BLD AUTO: 0 10E9/L (ref 0–0.2)
BASOPHILS NFR BLD AUTO: 0.5 %
BILIRUB SERPL-MCNC: 0.4 MG/DL (ref 0.2–1.3)
BUN SERPL-MCNC: 10 MG/DL (ref 7–30)
CALCIUM SERPL-MCNC: 8.9 MG/DL (ref 8.5–10.1)
CHLORIDE SERPL-SCNC: 106 MMOL/L (ref 94–109)
CHOLEST SERPL-MCNC: 218 MG/DL
CO2 SERPL-SCNC: 28 MMOL/L (ref 20–32)
CREAT SERPL-MCNC: 0.61 MG/DL (ref 0.52–1.04)
DIFFERENTIAL METHOD BLD: NORMAL
EOSINOPHIL # BLD AUTO: 0.1 10E9/L (ref 0–0.7)
EOSINOPHIL NFR BLD AUTO: 0.8 %
ERYTHROCYTE [DISTWIDTH] IN BLOOD BY AUTOMATED COUNT: 12.5 % (ref 10–15)
GFR SERPL CREATININE-BSD FRML MDRD: >90 ML/MIN/{1.73_M2}
GLUCOSE SERPL-MCNC: 82 MG/DL (ref 70–99)
HCT VFR BLD AUTO: 40.3 % (ref 35–47)
HDLC SERPL-MCNC: 76 MG/DL
HGB BLD-MCNC: 13.6 G/DL (ref 11.7–15.7)
IMM GRANULOCYTES # BLD: 0 10E9/L (ref 0–0.4)
IMM GRANULOCYTES NFR BLD: 0.3 %
LDLC SERPL CALC-MCNC: 133 MG/DL
LYMPHOCYTES # BLD AUTO: 1.7 10E9/L (ref 0.8–5.3)
LYMPHOCYTES NFR BLD AUTO: 20.9 %
MCH RBC QN AUTO: 31.6 PG (ref 26.5–33)
MCHC RBC AUTO-ENTMCNC: 33.7 G/DL (ref 31.5–36.5)
MCV RBC AUTO: 94 FL (ref 78–100)
MONOCYTES # BLD AUTO: 0.7 10E9/L (ref 0–1.3)
MONOCYTES NFR BLD AUTO: 8.3 %
NEUTROPHILS # BLD AUTO: 5.5 10E9/L (ref 1.6–8.3)
NEUTROPHILS NFR BLD AUTO: 69.2 %
NONHDLC SERPL-MCNC: 142 MG/DL
NRBC # BLD AUTO: 0 10*3/UL
NRBC BLD AUTO-RTO: 0 /100
PLATELET # BLD AUTO: 391 10E9/L (ref 150–450)
POTASSIUM SERPL-SCNC: 4 MMOL/L (ref 3.4–5.3)
PROT SERPL-MCNC: 7.4 G/DL (ref 6.8–8.8)
RBC # BLD AUTO: 4.31 10E12/L (ref 3.8–5.2)
SODIUM SERPL-SCNC: 140 MMOL/L (ref 133–144)
TRIGL SERPL-MCNC: 45 MG/DL
TSH SERPL DL<=0.005 MIU/L-ACNC: 0.86 MU/L (ref 0.4–4)
WBC # BLD AUTO: 7.9 10E9/L (ref 4–11)

## 2019-10-02 PROCEDURE — 80050 GENERAL HEALTH PANEL: CPT | Performed by: FAMILY MEDICINE

## 2019-10-02 PROCEDURE — 90750 HZV VACC RECOMBINANT IM: CPT | Performed by: FAMILY MEDICINE

## 2019-10-02 PROCEDURE — 80061 LIPID PANEL: CPT | Performed by: FAMILY MEDICINE

## 2019-10-02 PROCEDURE — 90472 IMMUNIZATION ADMIN EACH ADD: CPT | Performed by: FAMILY MEDICINE

## 2019-10-02 PROCEDURE — 90732 PPSV23 VACC 2 YRS+ SUBQ/IM: CPT | Performed by: FAMILY MEDICINE

## 2019-10-02 PROCEDURE — 90471 IMMUNIZATION ADMIN: CPT | Performed by: FAMILY MEDICINE

## 2019-10-02 PROCEDURE — 99396 PREV VISIT EST AGE 40-64: CPT | Mod: 25 | Performed by: FAMILY MEDICINE

## 2019-10-02 PROCEDURE — 36415 COLL VENOUS BLD VENIPUNCTURE: CPT | Performed by: FAMILY MEDICINE

## 2019-10-02 ASSESSMENT — PAIN SCALES - GENERAL: PAINLEVEL: NO PAIN (0)

## 2019-10-02 ASSESSMENT — MIFFLIN-ST. JEOR: SCORE: 1104.27

## 2019-10-02 NOTE — NURSING NOTE
"Chief Complaint   Patient presents with     Physical       Initial /72 (BP Location: Right arm, Patient Position: Sitting, Cuff Size: Adult Regular)   Pulse 82   Temp 98.8  F (37.1  C) (Tympanic)   Resp 16   Ht 1.549 m (5' 1\")   Wt 61.7 kg (136 lb)   SpO2 96%   BMI 25.70 kg/m   Estimated body mass index is 25.7 kg/m  as calculated from the following:    Height as of this encounter: 1.549 m (5' 1\").    Weight as of this encounter: 61.7 kg (136 lb).  Medication Reconciliation: complete  Kenisha Bhandari LPN  "

## 2020-01-19 ENCOUNTER — APPOINTMENT (OUTPATIENT)
Dept: GENERAL RADIOLOGY | Facility: HOSPITAL | Age: 65
End: 2020-01-19
Attending: EMERGENCY MEDICINE
Payer: COMMERCIAL

## 2020-01-19 ENCOUNTER — HOSPITAL ENCOUNTER (EMERGENCY)
Facility: HOSPITAL | Age: 65
Discharge: HOME | End: 2020-01-19
Attending: EMERGENCY MEDICINE | Admitting: EMERGENCY MEDICINE
Payer: COMMERCIAL

## 2020-01-19 VITALS
RESPIRATION RATE: 18 BRPM | HEART RATE: 72 BPM | TEMPERATURE: 98.6 F | OXYGEN SATURATION: 96 % | SYSTOLIC BLOOD PRESSURE: 133 MMHG | WEIGHT: 138 LBS | DIASTOLIC BLOOD PRESSURE: 73 MMHG | BODY MASS INDEX: 26.06 KG/M2 | HEIGHT: 61 IN
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
## 2020-01-19 LAB
ALBUMIN SERPL-MCNC: 3.8 G/DL (ref 3.4–5)
ALP SERPL-CCNC: 63 U/L (ref 40–150)
ALT SERPL W P-5'-P-CCNC: 29 U/L (ref 0–50)
ANION GAP SERPL CALCULATED.3IONS-SCNC: 6 MMOL/L (ref 3–14)
AST SERPL W P-5'-P-CCNC: 29 U/L (ref 0–45)
BASOPHILS # BLD AUTO: 0.1 10E9/L (ref 0–0.2)
BASOPHILS NFR BLD AUTO: 0.6 %
BILIRUB SERPL-MCNC: 0.2 MG/DL (ref 0.2–1.3)
BUN SERPL-MCNC: 17 MG/DL (ref 7–30)
CALCIUM SERPL-MCNC: 9.5 MG/DL (ref 8.5–10.1)
CHLORIDE SERPL-SCNC: 106 MMOL/L (ref 94–109)
CO2 SERPL-SCNC: 28 MMOL/L (ref 20–32)
CREAT SERPL-MCNC: 0.61 MG/DL (ref 0.52–1.04)
DIFFERENTIAL METHOD BLD: NORMAL
EOSINOPHIL # BLD AUTO: 0.2 10E9/L (ref 0–0.7)
EOSINOPHIL NFR BLD AUTO: 2.1 %
ERYTHROCYTE [DISTWIDTH] IN BLOOD BY AUTOMATED COUNT: 12.5 % (ref 10–15)
GFR SERPL CREATININE-BSD FRML MDRD: >90 ML/MIN/{1.73_M2}
GLUCOSE SERPL-MCNC: 94 MG/DL (ref 70–99)
HCT VFR BLD AUTO: 38.3 % (ref 35–47)
HGB BLD-MCNC: 12.8 G/DL (ref 11.7–15.7)
IMM GRANULOCYTES # BLD: 0.1 10E9/L (ref 0–0.4)
IMM GRANULOCYTES NFR BLD: 0.7 %
LYMPHOCYTES # BLD AUTO: 2.4 10E9/L (ref 0.8–5.3)
LYMPHOCYTES NFR BLD AUTO: 28.2 %
MCH RBC QN AUTO: 31.3 PG (ref 26.5–33)
MCHC RBC AUTO-ENTMCNC: 33.4 G/DL (ref 31.5–36.5)
MCV RBC AUTO: 94 FL (ref 78–100)
MONOCYTES # BLD AUTO: 0.9 10E9/L (ref 0–1.3)
MONOCYTES NFR BLD AUTO: 10.6 %
NEUTROPHILS # BLD AUTO: 4.9 10E9/L (ref 1.6–8.3)
NEUTROPHILS NFR BLD AUTO: 57.8 %
NRBC # BLD AUTO: 0 10*3/UL
NRBC BLD AUTO-RTO: 0 /100
PLATELET # BLD AUTO: 402 10E9/L (ref 150–450)
POTASSIUM SERPL-SCNC: 3.3 MMOL/L (ref 3.4–5.3)
PROT SERPL-MCNC: 7.1 G/DL (ref 6.8–8.8)
RBC # BLD AUTO: 4.09 10E12/L (ref 3.8–5.2)
SODIUM SERPL-SCNC: 140 MMOL/L (ref 133–144)
WBC # BLD AUTO: 8.4 10E9/L (ref 4–11)

## 2020-01-19 PROCEDURE — 36415 COLL VENOUS BLD VENIPUNCTURE: CPT | Performed by: EMERGENCY MEDICINE

## 2020-01-19 PROCEDURE — 25000132 ZZH RX MED GY IP 250 OP 250 PS 637: Performed by: EMERGENCY MEDICINE

## 2020-01-19 PROCEDURE — 99284 EMERGENCY DEPT VISIT MOD MDM: CPT | Mod: 25

## 2020-01-19 PROCEDURE — 85025 COMPLETE CBC W/AUTO DIFF WBC: CPT | Performed by: EMERGENCY MEDICINE

## 2020-01-19 PROCEDURE — 80053 COMPREHEN METABOLIC PANEL: CPT | Performed by: EMERGENCY MEDICINE

## 2020-01-19 PROCEDURE — 99284 EMERGENCY DEPT VISIT MOD MDM: CPT | Mod: Z6 | Performed by: EMERGENCY MEDICINE

## 2020-01-19 PROCEDURE — 71045 X-RAY EXAM CHEST 1 VIEW: CPT | Mod: TC

## 2020-01-19 RX ORDER — ACETAMINOPHEN 325 MG/1
1000 TABLET ORAL ONCE
Status: COMPLETED | OUTPATIENT
Start: 2020-01-19 | End: 2020-01-19

## 2020-01-19 RX ADMIN — ACETAMINOPHEN 975 MG: 325 TABLET, FILM COATED ORAL at 20:06

## 2020-01-19 ASSESSMENT — ENCOUNTER SYMPTOMS
ABDOMINAL PAIN: 0
LOWER EXTREMITY EDEMA: 0
WEAKNESS: 0
DIZZINESS: 0
VOMITING: 0
SHORTNESS OF BREATH: 0

## 2020-01-19 ASSESSMENT — MIFFLIN-ST. JEOR: SCORE: 1113.34

## 2020-01-19 NOTE — ED AVS SNAPSHOT
[facility]  [address]  [place] [address]  Phone:  [phone]                                    Mechelle Fong   MRN: [id number]    Department:  [facility]   Date of Visit:  1/19/2020           After Visit Summary Signature Page    I have received my discharge instructions, and my questions have been answered. I have discussed any challenges I see with this plan with the nurse or doctor.    ..........................................................................................................................................  Patient/Patient Representative Signature      ..........................................................................................................................................  Patient Representative Print Name and Relationship to Patient    ..................................................               ................................................  Date                                   Time    ..........................................................................................................................................  Reviewed by Signature/Title    ...................................................              ..............................................  Date                                               Time          22EPIC Rev 08/18

## 2020-01-20 NOTE — ED NOTES
Bed: ED09a  Expected date: 1/19/20  Expected time:   Means of arrival: ambulance  Comments: c collar in place
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
## 2020-02-17 ENCOUNTER — HEALTH MAINTENANCE LETTER (OUTPATIENT)
Age: 65
End: 2020-02-17

## 2020-03-17 ENCOUNTER — MYC REFILL (OUTPATIENT)
Dept: FAMILY MEDICINE | Facility: OTHER | Age: 65
End: 2020-03-17

## 2020-03-17 DIAGNOSIS — F41.1 GAD (GENERALIZED ANXIETY DISORDER): ICD-10-CM

## 2020-03-17 DIAGNOSIS — F33.1 MAJOR DEPRESSIVE DISORDER, RECURRENT EPISODE, MODERATE (H): ICD-10-CM

## 2020-03-19 ENCOUNTER — MYC REFILL (OUTPATIENT)
Dept: FAMILY MEDICINE | Facility: OTHER | Age: 65
End: 2020-03-19

## 2020-03-19 DIAGNOSIS — F41.1 GAD (GENERALIZED ANXIETY DISORDER): ICD-10-CM

## 2020-03-19 DIAGNOSIS — F33.1 MAJOR DEPRESSIVE DISORDER, RECURRENT EPISODE, MODERATE (H): ICD-10-CM

## 2020-03-19 RX ORDER — CLONAZEPAM 0.5 MG/1
TABLET ORAL
Qty: 60 TABLET | Refills: 0 | Status: SHIPPED | OUTPATIENT
Start: 2020-03-19 | End: 2021-02-15

## 2020-03-19 RX ORDER — CLONAZEPAM 0.5 MG/1
TABLET ORAL
Qty: 60 TABLET | Refills: 0 | Status: SHIPPED | OUTPATIENT
Start: 2020-03-19 | End: 2020-03-19

## 2020-04-20 ENCOUNTER — MYC REFILL (OUTPATIENT)
Dept: FAMILY MEDICINE | Facility: OTHER | Age: 65
End: 2020-04-20

## 2020-04-20 DIAGNOSIS — F33.1 MAJOR DEPRESSIVE DISORDER, RECURRENT EPISODE, MODERATE (H): ICD-10-CM

## 2020-04-20 NOTE — TELEPHONE ENCOUNTER
Prozac    Last Written Prescription Date:  3.19.2020  Last Fill Quantity: 30,   # refills: 0    Last Office Visit: 10.2.19  Future Office visit:       Routing refill request to provider for review/approval because:    PHQ-9 score:    PHQ 11/5/2018   PHQ-9 Total Score 0   Q9: Thoughts of better off dead/self-harm past 2 weeks Not at all

## 2020-05-19 ENCOUNTER — MYC REFILL (OUTPATIENT)
Dept: FAMILY MEDICINE | Facility: OTHER | Age: 65
End: 2020-05-19

## 2020-05-19 DIAGNOSIS — F33.1 MAJOR DEPRESSIVE DISORDER, RECURRENT EPISODE, MODERATE (H): ICD-10-CM

## 2020-05-21 ENCOUNTER — MYC REFILL (OUTPATIENT)
Dept: FAMILY MEDICINE | Facility: OTHER | Age: 65
End: 2020-05-21

## 2020-05-21 DIAGNOSIS — F33.1 MAJOR DEPRESSIVE DISORDER, RECURRENT EPISODE, MODERATE (H): ICD-10-CM

## 2020-05-21 NOTE — TELEPHONE ENCOUNTER
FLUoxetine (PROZAC) 20 MG capsule      Last Written Prescription Date:  4/20/20  Last Fill Quantity: 30,   # refills: 0  Last Office Visit: 10/2/19  Future Office visit:       Routing refill request to provider for review/approval because:   PHQ-9 score less than 5 in past 6 months Protocol Details    Recent (6 mo) or future (30 days) visit within the authorizing provider's specialty

## 2020-06-16 ENCOUNTER — MYC REFILL (OUTPATIENT)
Dept: FAMILY MEDICINE | Facility: OTHER | Age: 65
End: 2020-06-16

## 2020-06-16 DIAGNOSIS — F33.1 MAJOR DEPRESSIVE DISORDER, RECURRENT EPISODE, MODERATE (H): ICD-10-CM

## 2020-06-16 NOTE — TELEPHONE ENCOUNTER
Prozac      Last Written Prescription Date:  5/21/2020  Last Fill Quantity: 30,   # refills: 0  Last Office Visit: 10/2/2019

## 2020-06-17 NOTE — TELEPHONE ENCOUNTER
prozac      Last Written Prescription Date:  5.21.2020  Last Fill Quantity: 30,   # refills: 0  Last Office Visit: 10.2.19

## 2020-07-20 ENCOUNTER — MYC REFILL (OUTPATIENT)
Dept: FAMILY MEDICINE | Facility: OTHER | Age: 65
End: 2020-07-20

## 2020-07-20 DIAGNOSIS — F33.1 MAJOR DEPRESSIVE DISORDER, RECURRENT EPISODE, MODERATE (H): ICD-10-CM

## 2020-07-22 ENCOUNTER — MYC REFILL (OUTPATIENT)
Dept: FAMILY MEDICINE | Facility: OTHER | Age: 65
End: 2020-07-22

## 2020-07-22 DIAGNOSIS — F33.1 MAJOR DEPRESSIVE DISORDER, RECURRENT EPISODE, MODERATE (H): ICD-10-CM

## 2020-07-22 NOTE — TELEPHONE ENCOUNTER
FLUoxetine (PROZAC) 20 MG capsule   Last Written Prescription Date:  6/17/2020  Last Fill Quantity: 30,   # refills: 0  Last Office Visit: 10/2/2019  Future Office visit:

## 2020-07-22 NOTE — TELEPHONE ENCOUNTER
Fluxoxetine ( Prozac) 20mg      Last Written Prescription Date: 7/22/2020  Last Fill Quantity: 30,   # refills: 0  Last Office Visit: 10/2/2019  Future Office visit:

## 2020-07-23 ENCOUNTER — MYC REFILL (OUTPATIENT)
Dept: FAMILY MEDICINE | Facility: OTHER | Age: 65
End: 2020-07-23

## 2020-07-23 DIAGNOSIS — F33.1 MAJOR DEPRESSIVE DISORDER, RECURRENT EPISODE, MODERATE (H): ICD-10-CM

## 2020-08-18 ENCOUNTER — MYC REFILL (OUTPATIENT)
Dept: FAMILY MEDICINE | Facility: OTHER | Age: 65
End: 2020-08-18

## 2020-08-18 DIAGNOSIS — F33.1 MAJOR DEPRESSIVE DISORDER, RECURRENT EPISODE, MODERATE (H): ICD-10-CM

## 2020-08-19 DIAGNOSIS — F33.1 MAJOR DEPRESSIVE DISORDER, RECURRENT EPISODE, MODERATE (H): ICD-10-CM

## 2020-08-19 NOTE — TELEPHONE ENCOUNTER
Fluoxetine 20 MG      Last Written Prescription Date:  7-  Last Fill Quantity: 30,   # refills: 0  Last Office Visit: 10-2-19  Future Office visit:

## 2020-08-19 NOTE — TELEPHONE ENCOUNTER
FLUoxetine (PROZAC) 20 MG capsule      Last Written Prescription Date:  7/23/2020  Last Fill Quantity: 30,   # refills: 0  Last Office Visit: 10/2/2019  Future Office visit:

## 2020-09-18 ENCOUNTER — MYC REFILL (OUTPATIENT)
Dept: FAMILY MEDICINE | Facility: OTHER | Age: 65
End: 2020-09-18

## 2020-09-18 DIAGNOSIS — F33.1 MAJOR DEPRESSIVE DISORDER, RECURRENT EPISODE, MODERATE (H): ICD-10-CM

## 2020-09-18 NOTE — TELEPHONE ENCOUNTER
Prozac       Last Written Prescription Date:  8/20/2020  Last Fill Quantity: 30,   # refills: 0  Last Office Visit: 10/2/2019  Future Office visit:

## 2020-10-19 ENCOUNTER — MYC REFILL (OUTPATIENT)
Dept: FAMILY MEDICINE | Facility: OTHER | Age: 65
End: 2020-10-19

## 2020-10-19 DIAGNOSIS — F33.1 MAJOR DEPRESSIVE DISORDER, RECURRENT EPISODE, MODERATE (H): ICD-10-CM

## 2020-10-22 NOTE — TELEPHONE ENCOUNTER
SSRIs Protocol Upcnma05/19/2020 10:43 AM   PHQ-9 score less than 5 in past 6 months Protocol Details    Recent (6 mo) or future (30 days) visit within the authorizing provider's specialty      Prozac  Last Written Prescription Date:  9.18.2020  Last Fill Quantity: 30,   # refills: 0  Last Office Visit: 10.2.19  Future Office visit:       Routing refill request to provider for review/approval because:  Pt due for office visit.    PHQ-9 score:    PHQ 11/5/2018   PHQ-9 Total Score 0   Q9: Thoughts of better off dead/self-harm past 2 weeks Not at all       No appt at this time.      Keri Stevens RN

## 2020-11-16 ENCOUNTER — MYC REFILL (OUTPATIENT)
Dept: FAMILY MEDICINE | Facility: OTHER | Age: 65
End: 2020-11-16

## 2020-11-16 DIAGNOSIS — F33.1 MAJOR DEPRESSIVE DISORDER, RECURRENT EPISODE, MODERATE (H): ICD-10-CM

## 2020-11-16 NOTE — TELEPHONE ENCOUNTER
PROZAC      Last Written Prescription Date:  10/22/20  Last Fill Quantity: 30,   # refills: 0  Last Office Visit: 10/02/19

## 2020-11-29 ENCOUNTER — HEALTH MAINTENANCE LETTER (OUTPATIENT)
Age: 65
End: 2020-11-29

## 2020-12-16 ENCOUNTER — MYC REFILL (OUTPATIENT)
Dept: FAMILY MEDICINE | Facility: OTHER | Age: 65
End: 2020-12-16

## 2020-12-16 DIAGNOSIS — F33.1 MAJOR DEPRESSIVE DISORDER, RECURRENT EPISODE, MODERATE (H): ICD-10-CM

## 2020-12-16 NOTE — TELEPHONE ENCOUNTER
prozac 20 mg      Last Written Prescription Date:  11-  Last Fill Quantity: 30,   # refills: 0  Last Office Visit: 10-2-19

## 2021-01-18 ENCOUNTER — MYC REFILL (OUTPATIENT)
Dept: FAMILY MEDICINE | Facility: OTHER | Age: 66
End: 2021-01-18

## 2021-01-18 DIAGNOSIS — F33.1 MAJOR DEPRESSIVE DISORDER, RECURRENT EPISODE, MODERATE (H): ICD-10-CM

## 2021-01-18 NOTE — TELEPHONE ENCOUNTER
SSRIs Protocol Kvvrms5401/18/2021 10:36 AM   PHQ-9 score less than 5 in past 6 months Protocol Details    Recent (6 mo) or future (30 days) visit within the authorizing provider's specialty      PHQ-9 score:    PHQ 11/5/2018   PHQ-9 Total Score 0   Q9: Thoughts of better off dead/self-harm past 2 weeks Not at all           Prozac     Last Written Prescription Date: 12.16.2020  Last Fill Quantity: 30,   # refills: 0  Last Office Visit: 10.2.19  Future Office visit:       Routing refill request to provider for review/approval because:  See above. Pt due for appt. Call and schedule?      Keri Stevens RN

## 2021-02-15 ENCOUNTER — MYC REFILL (OUTPATIENT)
Dept: FAMILY MEDICINE | Facility: OTHER | Age: 66
End: 2021-02-15

## 2021-02-15 DIAGNOSIS — F33.1 MAJOR DEPRESSIVE DISORDER, RECURRENT EPISODE, MODERATE (H): ICD-10-CM

## 2021-02-15 DIAGNOSIS — F41.1 GAD (GENERALIZED ANXIETY DISORDER): ICD-10-CM

## 2021-02-15 RX ORDER — CLONAZEPAM 0.5 MG/1
TABLET ORAL
Qty: 60 TABLET | Refills: 0 | Status: SHIPPED | OUTPATIENT
Start: 2021-02-15 | End: 2021-05-19

## 2021-02-15 NOTE — TELEPHONE ENCOUNTER
Fluoxetine 20mg      Last Written Prescription Date:  1/18/2021  Last Fill Quantity: 30,   # refills: 0  Last Office Visit: 10/2/2019  Future Office visit:       Routing refill request to provider for review/approval because:    Clonazepam 0.5mg      Last Written Prescription Date:  3/19/2020  Last Fill Quantity: 60,   # refills: 0  Last Office Visit: 10/2/2019  Future Office visit:       Routing refill request to provider for review/approval because:

## 2021-02-15 NOTE — TELEPHONE ENCOUNTER
Duplicate requests - mychart request sent before.    Clonazepam 0.5mg      Last Written Prescription Date:  3/19/2020  Last Fill Quantity: 60,   # refills: 0  Last Office Visit: 10/2/2019  Future Office visit:       Routing refill request to provider for review/approval because:    Fluoxetine 20mg      Last Written Prescription Date:  1/18/2021  Last Fill Quantity: 30,   # refills: 0  Last Office Visit: 10/2/2019  Future Office visit:       Routing refill request to provider for review/approval because:

## 2021-03-17 ENCOUNTER — MYC REFILL (OUTPATIENT)
Dept: FAMILY MEDICINE | Facility: OTHER | Age: 66
End: 2021-03-17

## 2021-03-17 DIAGNOSIS — F33.1 MAJOR DEPRESSIVE DISORDER, RECURRENT EPISODE, MODERATE (H): ICD-10-CM

## 2021-03-17 NOTE — TELEPHONE ENCOUNTER
FLUoxetine (PROZAC) 20 MG capsule        Last Written Prescription Date:  2/15/21  Last Fill Quantity: 30,   # refills: 0  Last Office Visit: 10/2/19  Future Office visit:       Routing refill request to provider for review/approval because:    SSRIs Protocol Failed    PHQ-9 score less than 5 in past 6 months    PHQ 2/16/2018 4/27/2018 11/5/2018   PHQ-9 Total Score 0 3 0   Q9: Thoughts of better off dead/self-harm past 2 weeks Not at all Not at all Not at all       Recent (6 mo) or future (30 days) visit within the authorizing provider's specialty

## 2021-04-18 ENCOUNTER — MYC REFILL (OUTPATIENT)
Dept: FAMILY MEDICINE | Facility: OTHER | Age: 66
End: 2021-04-18

## 2021-04-18 DIAGNOSIS — F33.1 MAJOR DEPRESSIVE DISORDER, RECURRENT EPISODE, MODERATE (H): ICD-10-CM

## 2021-04-18 NOTE — LETTER
April 19, 2021      Mechelle Fong  [address]   [facility] [address]        Dear Mechelle,     APPOINTMENT REMINDER:   Our records indicates that it is time for you to be seen for an office visit.      We CANNOT continue to refill your medication unless you are seen for an office visit.     Taking care of your health is important to us, and ongoing visits with your provider are vital to your care.    We look forward to seeing you in the near future.  You may call our office at [phone] to schedule a visit.     Please disregard this notice if you have already made an appointment.        Sincerely,    Bhavin Khan MD

## 2021-04-19 NOTE — TELEPHONE ENCOUNTER
Pt has not been seen since 2019. Overdue for office visit. Letter sent to pt via [employer].

## 2021-05-18 NOTE — PROGRESS NOTES
Assessment & Plan     Major depressive disorder, recurrent episode, moderate (H)  Stable.  Continue same medications as outlined  - FLUoxetine (PROZAC) 20 MG capsule; Take 1 capsule (20 mg) by mouth daily    MOOKIE (generalized anxiety disorder)  Stable  - clonazePAM (KLONOPIN) 0.5 MG tablet; TAKE 1/2 TO 1 (ONE-HALF TO ONE) TABLET BY MOUTH TWICE DAILY AS NEEDED    Colon cancer screening  Cologard given  - COLOGUARD([employer])    Immunization due  Immunizations updated  - ZOSTER VACCINE RECOMBINANT ADJUVANTED IM NJX  - TDAP VACCINE (Adacel, Boostrix)  [[id number]]    Estrogen deficiency  DEXA scheduled  - DX Hip/Pelvis/Spine; Future    Encounter for screening mammogram for breast cancer  Mammogram scheduled  - MA Screening Digital Bilateral; Future      See Patient Instructions    No follow-ups on file.    Bhavin Khan MD  [facility] Min is a 66 year old who presents for the following health issues     HPI     Depression and Anxiety Follow-Up    How are you doing with your depression since your last visit? Improved     How are you doing with your anxiety since your last visit?  Improved     Are you having other symptoms that might be associated with depression or anxiety? No    Have you had a significant life event? No     Do you have any concerns with your use of alcohol or other drugs? No    Social History     Tobacco Use     Smoking status: Former Smoker     Packs/day: 0.50     Years: 30.00     Pack years: 15.00     Types: Cigarettes     Start date: 1975     Quit date: 2019     Years since quittin.8     Smokeless tobacco: Never Used     Tobacco comment: quit on and off through the years   Substance Use Topics     Alcohol use: Yes     Comment: occasionally     Drug use: No     PHQ 2018   PHQ-9 Total Score 3 0 0   Q9: Thoughts of better off dead/self-harm past 2 weeks Not at all Not at all Not at all     MOOKIE-7 SCORE 2018  "11/5/2018 5/19/2021   Total Score 5 4 0     Last PHQ-9 5/19/2021   1.  Little interest or pleasure in doing things 0   2.  Feeling down, depressed, or hopeless 0   3.  Trouble falling or staying asleep, or sleeping too much 0   4.  Feeling tired or having little energy 0   5.  Poor appetite or overeating 0   6.  Feeling bad about yourself 0   7.  Trouble concentrating 0   8.  Moving slowly or restless 0   Q9: Thoughts of better off dead/self-harm past 2 weeks 0   PHQ-9 Total Score 0   Difficulty at work, home, or with people -     MOOKIE-7  5/19/2021   1. Feeling nervous, anxious, or on edge 0   2. Not being able to stop or control worrying 0   3. Worrying too much about different things 0   4. Trouble relaxing 0   5. Being so restless that it is hard to sit still 0   6. Becoming easily annoyed or irritable 0   7. Feeling afraid, as if something awful might happen 0   MOOKIE-7 Total Score 0   If you checked any problems, how difficult have they made it for you to do your work, take care of things at home, or get along with other people? -       Suicide Assessment Five-step Evaluation and Treatment (SAFE-T)        Review of Systems   Constitutional, HEENT, cardiovascular, pulmonary, gi and gu systems are negative, except as otherwise noted.      Objective    /78 (BP Location: Right arm, Patient Position: Sitting, Cuff Size: Adult Regular)   Pulse 71   Temp 99.2  F (37.3  C) (Tympanic)   Resp 18   Ht 1.549 m (5' 1\")   Wt 59.1 kg (130 lb 6 oz)   SpO2 98%   BMI 24.63 kg/m    Body mass index is 24.63 kg/m .  Physical Exam  Vitals signs and nursing note reviewed.   Constitutional:       General: She is not in acute distress.     Appearance: She is well-developed.   Neurological:      Mental Status: She is alert and oriented to person, place, and time.   Psychiatric:         Mood and Affect: Mood normal.         Behavior: Behavior normal.         Thought Content: Thought content normal.        Other exam not " repeated    Admission on 01/19/2020, Discharged on 01/19/2020   Component Date Value Ref Range Status     WBC 01/19/2020 8.4  4.0 - 11.0 10e9/L Final     RBC Count 01/19/2020 4.09  3.8 - 5.2 10e12/L Final     Hemoglobin 01/19/2020 12.8  11.7 - 15.7 g/dL Final     Hematocrit 01/19/2020 38.3  35.0 - 47.0 % Final     MCV 01/19/2020 94  78 - 100 fl Final     MCH 01/19/2020 31.3  26.5 - 33.0 pg Final     MCHC 01/19/2020 33.4  31.5 - 36.5 g/dL Final     RDW 01/19/2020 12.5  10.0 - 15.0 % Final     Platelet Count 01/19/2020 402  150 - 450 10e9/L Final     Diff Method 01/19/2020 Automated Method   Final     % Neutrophils 01/19/2020 57.8  % Final     % Lymphocytes 01/19/2020 28.2  % Final     % Monocytes 01/19/2020 10.6  % Final     % Eosinophils 01/19/2020 2.1  % Final     % Basophils 01/19/2020 0.6  % Final     % Immature Granulocytes 01/19/2020 0.7  % Final     Nucleated RBCs 01/19/2020 0  0 /100 Final     Absolute Neutrophil 01/19/2020 4.9  1.6 - 8.3 10e9/L Final     Absolute Lymphocytes 01/19/2020 2.4  0.8 - 5.3 10e9/L Final     Absolute Monocytes 01/19/2020 0.9  0.0 - 1.3 10e9/L Final     Absolute Eosinophils 01/19/2020 0.2  0.0 - 0.7 10e9/L Final     Absolute Basophils 01/19/2020 0.1  0.0 - 0.2 10e9/L Final     Abs Immature Granulocytes 01/19/2020 0.1  0 - 0.4 10e9/L Final     Absolute Nucleated RBC 01/19/2020 0.0   Final     Sodium 01/19/2020 140  133 - 144 mmol/L Final     Potassium 01/19/2020 3.3* 3.4 - 5.3 mmol/L Final     Chloride 01/19/2020 106  94 - 109 mmol/L Final     Carbon Dioxide 01/19/2020 28  20 - 32 mmol/L Final     Anion Gap 01/19/2020 6  3 - 14 mmol/L Final     Glucose 01/19/2020 94  70 - 99 mg/dL Final     Urea Nitrogen 01/19/2020 17  7 - 30 mg/dL Final     Creatinine 01/19/2020 0.61  0.52 - 1.04 mg/dL Final     GFR Estimate 01/19/2020 >90  >60 mL/min/[1.73_m2] Final    Comment: Non  GFR Calc  Starting 12/18/2018, serum creatinine based estimated GFR (eGFR) will be   calculated using  the Chronic Kidney Disease Epidemiology Collaboration   (CKD-EPI) equation.       GFR Estimate If Black 01/19/2020 >90  >60 mL/min/[1.73_m2] Final    Comment:  GFR Calc  Starting 12/18/2018, serum creatinine based estimated GFR (eGFR) will be   calculated using the Chronic Kidney Disease Epidemiology Collaboration   (CKD-EPI) equation.       Calcium 01/19/2020 9.5  8.5 - 10.1 mg/dL Final     Bilirubin Total 01/19/2020 0.2  0.2 - 1.3 mg/dL Final     Albumin 01/19/2020 3.8  3.4 - 5.0 g/dL Final     Protein Total 01/19/2020 7.1  6.8 - 8.8 g/dL Final     Alkaline Phosphatase 01/19/2020 63  40 - 150 U/L Final     ALT 01/19/2020 29  0 - 50 U/L Final     AST 01/19/2020 29  0 - 45 U/L Final

## 2021-05-19 ENCOUNTER — OFFICE VISIT (OUTPATIENT)
Dept: FAMILY MEDICINE | Facility: OTHER | Age: 66
End: 2021-05-19
Attending: FAMILY MEDICINE
Payer: MEDICARE

## 2021-05-19 VITALS
TEMPERATURE: 99.2 F | HEART RATE: 71 BPM | DIASTOLIC BLOOD PRESSURE: 78 MMHG | OXYGEN SATURATION: 98 % | RESPIRATION RATE: 18 BRPM | HEIGHT: 61 IN | SYSTOLIC BLOOD PRESSURE: 132 MMHG | BODY MASS INDEX: 24.62 KG/M2 | WEIGHT: 130.38 LBS

## 2021-05-19 DIAGNOSIS — Z12.31 ENCOUNTER FOR SCREENING MAMMOGRAM FOR BREAST CANCER: ICD-10-CM

## 2021-05-19 DIAGNOSIS — E28.39 ESTROGEN DEFICIENCY: ICD-10-CM

## 2021-05-19 DIAGNOSIS — Z23 IMMUNIZATION DUE: ICD-10-CM

## 2021-05-19 DIAGNOSIS — F33.1 MAJOR DEPRESSIVE DISORDER, RECURRENT EPISODE, MODERATE (H): ICD-10-CM

## 2021-05-19 DIAGNOSIS — F41.1 GAD (GENERALIZED ANXIETY DISORDER): ICD-10-CM

## 2021-05-19 DIAGNOSIS — Z12.11 COLON CANCER SCREENING: Primary | ICD-10-CM

## 2021-05-19 PROCEDURE — 99213 OFFICE O/P EST LOW 20 MIN: CPT | Performed by: FAMILY MEDICINE

## 2021-05-19 PROCEDURE — G0463 HOSPITAL OUTPT CLINIC VISIT: HCPCS | Mod: 25

## 2021-05-19 PROCEDURE — 90715 TDAP VACCINE 7 YRS/> IM: CPT

## 2021-05-19 PROCEDURE — 90471 IMMUNIZATION ADMIN: CPT | Mod: GY

## 2021-05-19 RX ORDER — CLONAZEPAM 0.5 MG/1
TABLET ORAL
Qty: 60 TABLET | Refills: 0 | Status: SHIPPED | OUTPATIENT
Start: 2021-05-19 | End: 2022-12-09

## 2021-05-19 ASSESSMENT — ANXIETY QUESTIONNAIRES
7. FEELING AFRAID AS IF SOMETHING AWFUL MIGHT HAPPEN: NOT AT ALL
GAD7 TOTAL SCORE: 0
4. TROUBLE RELAXING: NOT AT ALL
1. FEELING NERVOUS, ANXIOUS, OR ON EDGE: NOT AT ALL
3. WORRYING TOO MUCH ABOUT DIFFERENT THINGS: NOT AT ALL
6. BECOMING EASILY ANNOYED OR IRRITABLE: NOT AT ALL
2. NOT BEING ABLE TO STOP OR CONTROL WORRYING: NOT AT ALL
5. BEING SO RESTLESS THAT IT IS HARD TO SIT STILL: NOT AT ALL

## 2021-05-19 ASSESSMENT — PAIN SCALES - GENERAL: PAINLEVEL: NO PAIN (0)

## 2021-05-19 ASSESSMENT — PATIENT HEALTH QUESTIONNAIRE - PHQ9: SUM OF ALL RESPONSES TO PHQ QUESTIONS 1-9: 0

## 2021-05-19 ASSESSMENT — MIFFLIN-ST. JEOR: SCORE: 1068.76

## 2021-05-20 ASSESSMENT — ANXIETY QUESTIONNAIRES: GAD7 TOTAL SCORE: 0

## 2021-05-24 ENCOUNTER — HOSPITAL ENCOUNTER (OUTPATIENT)
Dept: BONE DENSITY | Facility: HOSPITAL | Age: 66
End: 2021-05-24
Attending: FAMILY MEDICINE
Payer: MEDICARE

## 2021-05-24 ENCOUNTER — ANCILLARY PROCEDURE (OUTPATIENT)
Dept: MAMMOGRAPHY | Facility: OTHER | Age: 66
End: 2021-05-24
Attending: FAMILY MEDICINE
Payer: MEDICARE

## 2021-05-24 ENCOUNTER — TELEPHONE (OUTPATIENT)
Dept: MAMMOGRAPHY | Facility: OTHER | Age: 66
End: 2021-05-24

## 2021-05-24 DIAGNOSIS — Z12.31 ENCOUNTER FOR SCREENING MAMMOGRAM FOR BREAST CANCER: ICD-10-CM

## 2021-05-24 DIAGNOSIS — E28.39 ESTROGEN DEFICIENCY: ICD-10-CM

## 2021-05-24 PROCEDURE — 77080 DXA BONE DENSITY AXIAL: CPT

## 2021-05-24 PROCEDURE — 77063 BREAST TOMOSYNTHESIS BI: CPT | Mod: TC

## 2021-05-25 ENCOUNTER — MYC MEDICAL ADVICE (OUTPATIENT)
Dept: FAMILY MEDICINE | Facility: OTHER | Age: 66
End: 2021-05-25

## 2021-06-03 ENCOUNTER — HOSPITAL ENCOUNTER (OUTPATIENT)
Dept: ULTRASOUND IMAGING | Facility: HOSPITAL | Age: 66
Discharge: HOME | End: 2021-06-03
Attending: FAMILY MEDICINE | Admitting: FAMILY MEDICINE
Payer: MEDICARE

## 2021-06-03 DIAGNOSIS — R92.8 ABNORMAL MAMMOGRAM: ICD-10-CM

## 2021-06-03 PROCEDURE — 76642 ULTRASOUND BREAST LIMITED: CPT | Mod: RT

## 2021-06-09 RX ORDER — LIDOCAINE HYDROCHLORIDE 10 MG/ML
10 INJECTION, SOLUTION EPIDURAL; INFILTRATION; INTRACAUDAL; PERINEURAL ONCE
Status: CANCELLED | OUTPATIENT
Start: 2021-06-17

## 2021-06-10 ENCOUNTER — TELEPHONE (OUTPATIENT)
Dept: INTERVENTIONAL RADIOLOGY/VASCULAR | Facility: HOSPITAL | Age: 66
End: 2021-06-10

## 2021-06-16 ENCOUNTER — TELEPHONE (OUTPATIENT)
Dept: INTERVENTIONAL RADIOLOGY/VASCULAR | Facility: HOSPITAL | Age: 66
End: 2021-06-16

## 2021-06-17 ENCOUNTER — ANCILLARY PROCEDURE (OUTPATIENT)
Dept: MAMMOGRAPHY | Facility: OTHER | Age: 66
End: 2021-06-17
Attending: RADIOLOGY
Payer: MEDICARE

## 2021-06-17 ENCOUNTER — HOSPITAL ENCOUNTER (OUTPATIENT)
Dept: ULTRASOUND IMAGING | Facility: HOSPITAL | Age: 66
End: 2021-06-17
Attending: SURGERY
Payer: MEDICARE

## 2021-06-17 ENCOUNTER — HOSPITAL ENCOUNTER (OUTPATIENT)
Facility: HOSPITAL | Age: 66
Discharge: HOME | End: 2021-06-17
Attending: RADIOLOGY | Admitting: RADIOLOGY
Payer: MEDICARE

## 2021-06-17 VITALS — OXYGEN SATURATION: 97 % | DIASTOLIC BLOOD PRESSURE: 80 MMHG | HEART RATE: 74 BPM | SYSTOLIC BLOOD PRESSURE: 149 MMHG

## 2021-06-17 DIAGNOSIS — N63.11 MASS OF UPPER OUTER QUADRANT OF RIGHT BREAST: ICD-10-CM

## 2021-06-17 DIAGNOSIS — Z98.890 STATUS POST RIGHT BREAST BIOPSY: ICD-10-CM

## 2021-06-17 PROCEDURE — 88305 TISSUE EXAM BY PATHOLOGIST: CPT | Mod: TC | Performed by: RADIOLOGY

## 2021-06-17 PROCEDURE — 999N000065 MA POST PROCEDURE RIGHT: Mod: TC

## 2021-06-17 PROCEDURE — 250N000009 HC RX 250: Performed by: RADIOLOGY

## 2021-06-17 PROCEDURE — 272N000032 US BREAST BIOPSY VACUUM RIGHT

## 2021-06-17 RX ORDER — LIDOCAINE HYDROCHLORIDE 10 MG/ML
10 INJECTION, SOLUTION EPIDURAL; INFILTRATION; INTRACAUDAL; PERINEURAL ONCE
Status: COMPLETED | OUTPATIENT
Start: 2021-06-17 | End: 2021-06-17

## 2021-06-17 RX ADMIN — LIDOCAINE HYDROCHLORIDE,EPINEPHRINE BITARTRATE 8 ML: 20; .01 INJECTION, SOLUTION INFILTRATION; PERINEURAL at 11:13

## 2021-06-17 RX ADMIN — LIDOCAINE HYDROCHLORIDE 8 ML: 10 INJECTION, SOLUTION EPIDURAL; INFILTRATION; INTRACAUDAL; PERINEURAL at 11:14

## 2021-06-17 NOTE — IP AVS SNAPSHOT
[facility]  [address] [place] [id number]  Phone: [phone]                                    After Visit Summary   6/17/2021    Mechelle Fong    MRN: [id number]           After Visit Summary Signature Page    I have received my discharge instructions, and my questions have been answered. I have discussed any challenges I see with this plan with the nurse or doctor.    ..........................................................................................................................................  Patient/Patient Representative Signature      ..........................................................................................................................................  Patient Representative Print Name and Relationship to Patient    ..................................................               ................................................  Date                                   Time    ..........................................................................................................................................  Reviewed by Signature/Title    ...................................................              ..............................................  Date                                               Time          22EPIC Rev 08/18

## 2021-06-17 NOTE — IP AVS SNAPSHOT
After Visit Summary Template Not Found    This Print Group is only intended to be used in the After Visit Summary and can only be used in a report that uses a released After Visit Summary Template.                       MRN:[id number]                      After Visit Summary   6/17/2021    Mechelle Fong    MRN: [id number]           Visit Information        Provider Department      6/17/2021 11:00 AM HIX[facility]N; HIIRRAD; HIUS1 HI ULTRASOUND           Review of your medicines      Notice    This visit is during an admission. Changes to the med list made in this visit will be reflected in the After Visit Summary of the admission.           Protect others around you: Learn how to safely use, store and throw away your medicines at www.disposemymeds.org.       Follow-ups after your visit       Your next 10 appointments already scheduled    Jun 17, 2021 11:45 AM  MA POST PROCEDURE RIGHT with [address] ([facility] [place] ) [address] [place] [address]  [phone]   How do I prepare for my exam? (Food and drink instructions)  No Food and Drink Restrictions.    How do I prepare for my exam? (Other instructions)  Do not use any powder, lotion or deodorant under your arms or on your breast. If you do, we will ask you to remove it before your exam.    What should I wear: Wear comfortable, two-piece clothing.    How long does the exam take: Most scans will take 15 minutes.    What should I bring: Bring any previous mammograms from other facilities or have them mailed to the breast center.    Do I need a :  No  is needed.    What do I need to tell my doctor: If you have any allergies, tell your care team.    What should I do after the exam: No restrictions, you may resume normal activities.    What is this test: This test is an x-ray of the breast to look for breast disease. The breast is pressed between two plates to flatten and spread the tissue. An X-ray is taken of the breast  "from different angles.    Who should I call with questions: If you have any questions, please call the Imaging Department where you will have your exam. Directions, parking instructions, and other information are available on our website, [email or web address].    Other information about my exam  Three-dimensional (3D) mammograms are available at [facility] locations in [facility], [place], [place], [facility], [place], [facility] and [place].  Health locations include [place] and the [facility] in [place].    Benefits of 3D mammograms include:  * Improved rate of cancer detection  * Decreases your chance of having to go back for more tests, which means fewer:  * \"False-positive\" results (This means that there is an abnormal area but it isn't cancer.)  * Invasive testing procedures, such as a biopsy or surgery  * Can provide clearer images of the breast if you have dense breast tissue.    *3D mammography is an optional exam that anyone can have with a 2D mammogram. It doesn't replace or take the place of a 2D mammogram. 2D mammograms remain an effective screening test for all women.  Not all insurance companies cover the cost of a 3D mammogram. Check with your insurance.     Jun 25, 2021 10:30 AM  (Arrive by 10:15 AM)  New Visit with Gerber Panda MD  [facility] ([facility] [place] ) [address] [place] [address] [facility] [address]  [phone]         Care Instructions       Further instructions from your care team         DISCHARGE INSTRUCTIONS FOR  BREAST BIOPSY    BREAST BIOPSY  A needle was used to locate the breast tissue. Tissue was removed to check the cells and be examined by a Pathologist. This will help your doctor plan any further treatment or follow-up. Your doctor will tell you the results of the tests in 3 to 5 days.    Follow these instructions:    Climb stairs slowly and use the hand rail. Avoid extra trips. No running or " jumping.    No pushing, pulling or straining (vacuuming, shoveling, sweeping etc.)    You may shower tomorrow, pat the are dry around the tegaderm dressing.    Wear a bra at all times until your breast is fully healed.    Apply ice to the breast during the first few hours following the procedure to relieve swelling and bruising.    Steri strips stay in place for 7-10 days or until they fall off. Do not pull them off.    May remove pressure dressing after 24 hours.    Call your doctor if you have:    increased bleeding or drainage from your incision.    swelling, redness or opening of your incision.    foul smell from your incision or dressing.    red streaks from your incision.    chills or fever over 101 degrees (by mouth).    pain not helped by your pain medication.    trouble or pain with breathing.    any new problems or concerns.    Additional Information About Your Visit       [facility] Information    [employer]edsafe gives you secure access to your electronic health record. If you see a primary care provider, you can also send messages to your care team and make appointments. If you have questions, please call your primary care clinic.  If you do not have a primary care provider, please call [phone] and they will assist you.       Care EveryWhere ID    This is your Care EveryWhere ID. This could be used by other organizations to access your [facility] medical records  [id number]       Your Vitals Were  Most recent update: 6/17/2021 11:03 AM    Blood Pressure   149/80      Pulse   74    Pulse Oximetry   97%            Primary Care Provider Office Phone # Fax #    Bhavin Khan [phone]      Equal Access to Services    [facility]: Hadii dilia linares hadasho Socooperali, waaxda luqadaha, qaybta kaalmada adeegyada, edwin cotton . So [facility] [phone].    ATENCIÓN: Si habla español, tiene a marshall disposición servicios gratuitos de asistencia lingüística. Llame al  [phone].    We comply with applicable federal and state civil rights laws, including the [place] Human Rights Act. We do not discriminate on the basis of race, color, creed, Scientologist, national origin, marital status, age, disability, sex, sexual orientation, or gender identity.    If you would like an itemization of your charges they will now be available in [employer] 30 days after discharge. To access the itemized statements in [employer] go to billing/billing summary. From there select view account. There will be multiple tabs showing an overview of your account, detail, payments, and communications. From the communications tab you can see your monthly statements, your itemized statements, and any billing letters generated for your account. If you do not have a [employer] account and need help getting access please contact [employer] support at [phone].  If you would prefer to have your itemized statements mailed please contact our automated itemized bill request line at [phone] option  2.       Thank you!    Thank you for choosing [facility] for your care. Our goal is always to provide you with excellent care. Hearing back from our patients is one way we can continue to improve our services. Please take a few minutes to complete the written survey that you may receive in the mail after you visit with us. Thank you!         Medication List     Notice    This visit is during an admission. Changes to the med list made in this visit will be reflected in the After Visit Summary of the admission.

## 2021-06-17 NOTE — PROGRESS NOTES
Procedure: breast biopsy, right    There were  no complications and patient has no symptoms..      Tolerated procedure well.    Radiologist:Dr Olivier    Time Out: Prior to the start of the procedure and with procedural staff participation, I verbally confirmed the patient s identity using two indicators, relevant allergies, that the procedure was appropriate and matched the consent or emergent situation, and that the correct equipment/implants were available. Immediately prior to starting the procedure I conducted the Time Out with the procedural staff and re-confirmed the patient s name, procedure, and site/side. (The Joint Commission universal protocol was followed.)  Yes    Position:  supine    Pain:  0    Sedation:None. Local Anesthestic used  No sedation    Estimated Blood Loss: None     Condition: Stable    Comments: See dictated procedure note for full details     Rebecca Marquez RN

## 2021-06-18 ENCOUNTER — TELEPHONE (OUTPATIENT)
Dept: INTERVENTIONAL RADIOLOGY/VASCULAR | Facility: HOSPITAL | Age: 66
End: 2021-06-18

## 2021-06-18 NOTE — TELEPHONE ENCOUNTER
Pt states she is doing good.  Denies any pain or bruising.  Adv to call with any questions or concerns.

## 2021-06-21 ENCOUNTER — TRANSFERRED RECORDS (OUTPATIENT)
Dept: HEALTH INFORMATION MANAGEMENT | Facility: HOSPITAL | Age: 66
End: 2021-06-21

## 2021-06-21 LAB
COLOGUARD-ABSTRACT: POSITIVE
COPATH REPORT: NORMAL

## 2021-06-24 DIAGNOSIS — R92.8 ABNORMAL MAMMOGRAM: Primary | ICD-10-CM

## 2021-06-24 DIAGNOSIS — Z98.890 STATUS POST BREAST BIOPSY: ICD-10-CM

## 2021-07-06 NOTE — PROGRESS NOTES
Mechelle is a 66 year old who is being evaluated via a billable telephone visit.      What phone number would you like to be contacted at? [phone]  How would you like to obtain your AVS? MyChart    Assessment & Plan     Age-related osteoporosis without current pathological fracture  Vitamin D and Calcium  Reclast once yearly.     - calcium carbonate-vitamin D (OS-KENNEDI) 600-400 MG-UNIT chewable tablet; Take 1 chew tab by mouth 2 times daily       See Patient Instructions    No follow-ups on file.    Bhavin Khan MD  [facility] [place]    Subjective   Mechelle is a 66 year old who presents for the following health issues     HPI     Concern - Osteoporosis of left hip   Onset: ongoing  Description: following up with her osteoporosis  Intensity: mild  Progression of Symptoms:  improving  Accompanying Signs & Symptoms: none  Previous history of similar problem: none  Precipitating factors:        Worsened by: none  Alleviating factors:        Improved by: none  Therapies tried and outcome: states she is feeling good no concerns     Mechelle underwent screening DEXA which showed her to have a T score of -3.1 in the left hip consistent with osteoporosis.  This is reviewed in detail.    Review of Systems   Constitutional, HEENT, cardiovascular, pulmonary, gi and gu systems are negative, except as otherwise noted.      Objective           Vitals:  No vitals were obtained today due to virtual visit.    Physical Exam   healthy, alert and no distress  PSYCH: Alert and oriented times 3; coherent speech, normal   rate and volume, able to articulate logical thoughts, able   to abstract reason, no tangential thoughts, no hallucinations   or delusions  Her affect is normal  RESP: No cough, no audible wheezing, able to talk in full sentences  Remainder of exam unable to be completed due to telephone visits    Hospital Outpatient Visit on 06/17/2021   Component Date Value Ref Range Status     Copath Report 06/17/2021     "Final                    Value:Patient Name: JOSE L RODRIGUEZ  MR#: [id number]  Specimen #: [id number]  Collected: 6/17/2021  Received: 6/18/2021  Reported: 6/21/2021 12:24  Ordering Phy(s): VIPIN ABBOTT  Additional Phy(s): JEF NICHOLS    For improved result formatting, select 'View Enhanced Report Format' under   Linked Documents section.    SPECIMEN(S):  Right ultrasound guided breast needle biopsy, 11:00, 6 cm from  nipple    FINAL DIAGNOSIS:  Right breast, upper outer quadrant, 11:00, 6 cm from nipple,   ultrasound-guided core biopsy  - Foreign body type giant cells, cholesterol clefts, fat necrosis,   hemosiderin laden macrophages, and  calcifications  - Negative for atypia and malignancy    COMMENT:  The breast biopsies contain a well circumscribed area containing   cholesterol clefts, foreign body type giant  cells, calcifications, and hemosiderin laden macrophages consistent with   prior hemorrhage. The findings could  be related to a reaction to foreign material (alth                          ough there is no   polarizable material identified), ruptured  cyst, or prior procedural changes. There is no atypia or malignancy.    An intradepartmental consultation was obtained.    I have personally reviewed all specimens and/or slides, including the   listed special stains, and used them  with my medical judgement to determine or confirm the final diagnosis.    Electronically signed out by:    Miri Cardozo M.D.    CLINICAL HISTORY:  66-year-old, right breast mass at 11:00, 6.0 centimeters from nipple,   ?involuting fibroadenoma    GROSS:  A single specimen container with formalin is received labeled with the   patient's name, date of birth, and  medical record number. Information on the requisition slip, container, and   associated labels is confirmed.    The specimen is designated \"right upper outer quadrant 11:00, 6 cm from   nipple breast needle core " "biopsy\"  consisting of three yellow lobulated needle core biopsies, ranging in   length from 1.5-1.7 cm, and each  approximat                          ely 0.3 cm in diameter.  The fragments are filtered, wrapped and   are entirely submitted in one  cassette.  Time tissue removed 1100 hrs., time in formalin 1130 hrs.   (Dictated by: Mellisa Schroeder 6/18/2021  03:54 PM)    MICROSCOPIC:  Sections demonstrate breast parenchyma with a well circumscribed area   containing fat necrosis, giant cells,  cholesterol clefts, hemosiderin laden macrophages. Calcifications are also   noted. No polarizable material is  identified. The background breast tissue shows focal fibrocystic change.   There is no atypia or malignancy.    CPT Codes  A: 93004-RU5    COLLECTION SITE:  Client: [facility]  Location: HIUS (B)    The technical component of this testing was completed at the [facility], with the professional component completed   at the [facility], [address] [place] [phone] ([phone])                 Phone call duration: 13 minutes  "

## 2021-07-12 ENCOUNTER — VIRTUAL VISIT (OUTPATIENT)
Dept: FAMILY MEDICINE | Facility: OTHER | Age: 66
End: 2021-07-12
Attending: FAMILY MEDICINE
Payer: COMMERCIAL

## 2021-07-12 DIAGNOSIS — M81.0 AGE-RELATED OSTEOPOROSIS WITHOUT CURRENT PATHOLOGICAL FRACTURE: Primary | ICD-10-CM

## 2021-07-12 PROCEDURE — 99213 OFFICE O/P EST LOW 20 MIN: CPT | Mod: 95 | Performed by: FAMILY MEDICINE

## 2021-07-12 ASSESSMENT — ANXIETY QUESTIONNAIRES
5. BEING SO RESTLESS THAT IT IS HARD TO SIT STILL: NOT AT ALL
1. FEELING NERVOUS, ANXIOUS, OR ON EDGE: NOT AT ALL
3. WORRYING TOO MUCH ABOUT DIFFERENT THINGS: NOT AT ALL
4. TROUBLE RELAXING: NOT AT ALL
GAD7 TOTAL SCORE: 0
7. FEELING AFRAID AS IF SOMETHING AWFUL MIGHT HAPPEN: NOT AT ALL
2. NOT BEING ABLE TO STOP OR CONTROL WORRYING: NOT AT ALL
6. BECOMING EASILY ANNOYED OR IRRITABLE: NOT AT ALL

## 2021-07-12 ASSESSMENT — PATIENT HEALTH QUESTIONNAIRE - PHQ9: SUM OF ALL RESPONSES TO PHQ QUESTIONS 1-9: 0

## 2021-07-13 ASSESSMENT — ANXIETY QUESTIONNAIRES: GAD7 TOTAL SCORE: 0

## 2021-07-21 ENCOUNTER — TELEPHONE (OUTPATIENT)
Dept: FAMILY MEDICINE | Facility: OTHER | Age: 66
End: 2021-07-21

## 2021-07-21 NOTE — TELEPHONE ENCOUNTER
Note from PAC Shani that Dr Khan updated patient in need of Reclast infusion. No current orders for this in therapy plan. His most recent visit notes do state Reclast yearly. Plan entered and sent to Dr Khan with a note alerting he needs to review and update PRN for accuracy re dose/labs/etc, and then sign. Once signed, we will schedule patient for this years dosing.

## 2021-07-22 DIAGNOSIS — M81.0 AGE-RELATED OSTEOPOROSIS WITHOUT CURRENT PATHOLOGICAL FRACTURE: Primary | ICD-10-CM

## 2021-07-22 RX ORDER — HEPARIN SODIUM (PORCINE) LOCK FLUSH IV SOLN 100 UNIT/ML 100 UNIT/ML
5 SOLUTION INTRAVENOUS
Status: CANCELLED | OUTPATIENT
Start: 2021-07-22

## 2021-07-22 RX ORDER — NALOXONE HYDROCHLORIDE 0.4 MG/ML
0.2 INJECTION, SOLUTION INTRAMUSCULAR; INTRAVENOUS; SUBCUTANEOUS
Status: CANCELLED | OUTPATIENT
Start: 2021-07-22

## 2021-07-22 RX ORDER — METHYLPREDNISOLONE SODIUM SUCCINATE 125 MG/2ML
125 INJECTION, POWDER, LYOPHILIZED, FOR SOLUTION INTRAMUSCULAR; INTRAVENOUS
Status: CANCELLED
Start: 2021-07-22

## 2021-07-22 RX ORDER — ZOLEDRONIC ACID 5 MG/100ML
5 INJECTION, SOLUTION INTRAVENOUS ONCE
Status: CANCELLED
Start: 2021-07-22 | End: 2021-07-22

## 2021-07-22 RX ORDER — DIPHENHYDRAMINE HYDROCHLORIDE 50 MG/ML
50 INJECTION INTRAMUSCULAR; INTRAVENOUS
Status: CANCELLED
Start: 2021-07-22

## 2021-07-22 RX ORDER — ALBUTEROL SULFATE 90 UG/1
1-2 AEROSOL, METERED RESPIRATORY (INHALATION)
Status: CANCELLED
Start: 2021-07-22

## 2021-07-22 RX ORDER — EPINEPHRINE 1 MG/ML
0.3 INJECTION, SOLUTION, CONCENTRATE INTRAVENOUS EVERY 5 MIN PRN
Status: CANCELLED | OUTPATIENT
Start: 2021-07-22

## 2021-07-22 RX ORDER — ALBUTEROL SULFATE 0.83 MG/ML
2.5 SOLUTION RESPIRATORY (INHALATION)
Status: CANCELLED | OUTPATIENT
Start: 2021-07-22

## 2021-07-22 RX ORDER — MEPERIDINE HYDROCHLORIDE 25 MG/ML
25 INJECTION INTRAMUSCULAR; INTRAVENOUS; SUBCUTANEOUS EVERY 30 MIN PRN
Status: CANCELLED | OUTPATIENT
Start: 2021-07-22

## 2021-07-22 RX ORDER — HEPARIN SODIUM,PORCINE 10 UNIT/ML
5 VIAL (ML) INTRAVENOUS
Status: CANCELLED | OUTPATIENT
Start: 2021-07-22

## 2021-07-30 ENCOUNTER — INFUSION THERAPY VISIT (OUTPATIENT)
Dept: INFUSION THERAPY | Facility: OTHER | Age: 66
End: 2021-07-30
Attending: FAMILY MEDICINE
Payer: MEDICARE

## 2021-07-30 ENCOUNTER — TELEPHONE (OUTPATIENT)
Dept: FAMILY MEDICINE | Facility: OTHER | Age: 66
End: 2021-07-30

## 2021-07-30 VITALS
TEMPERATURE: 97.7 F | BODY MASS INDEX: 24.22 KG/M2 | WEIGHT: 128.31 LBS | HEIGHT: 61 IN | HEART RATE: 75 BPM | OXYGEN SATURATION: 98 % | DIASTOLIC BLOOD PRESSURE: 73 MMHG | SYSTOLIC BLOOD PRESSURE: 125 MMHG

## 2021-07-30 DIAGNOSIS — M81.0 AGE-RELATED OSTEOPOROSIS WITHOUT CURRENT PATHOLOGICAL FRACTURE: Primary | ICD-10-CM

## 2021-07-30 DIAGNOSIS — Z12.11 COLON CANCER SCREENING: Primary | ICD-10-CM

## 2021-07-30 LAB
CALCIUM SERPL-MCNC: 9.4 MG/DL (ref 8.5–10.1)
CREAT SERPL-MCNC: 0.64 MG/DL (ref 0.52–1.04)
GFR SERPL CREATININE-BSD FRML MDRD: >90 ML/MIN/1.73M2

## 2021-07-30 PROCEDURE — 258N000003 HC RX IP 258 OP 636: Performed by: FAMILY MEDICINE

## 2021-07-30 PROCEDURE — 82310 ASSAY OF CALCIUM: CPT | Mod: ZL | Performed by: FAMILY MEDICINE

## 2021-07-30 PROCEDURE — 96365 THER/PROPH/DIAG IV INF INIT: CPT

## 2021-07-30 PROCEDURE — 82565 ASSAY OF CREATININE: CPT | Mod: ZL | Performed by: FAMILY MEDICINE

## 2021-07-30 PROCEDURE — 36415 COLL VENOUS BLD VENIPUNCTURE: CPT | Mod: ZL | Performed by: FAMILY MEDICINE

## 2021-07-30 PROCEDURE — 250N000011 HC RX IP 250 OP 636: Performed by: FAMILY MEDICINE

## 2021-07-30 RX ORDER — DIPHENHYDRAMINE HYDROCHLORIDE 50 MG/ML
50 INJECTION INTRAMUSCULAR; INTRAVENOUS
Status: CANCELLED
Start: 2021-07-30

## 2021-07-30 RX ORDER — EPINEPHRINE 1 MG/ML
0.3 INJECTION, SOLUTION, CONCENTRATE INTRAVENOUS EVERY 5 MIN PRN
Status: CANCELLED | OUTPATIENT
Start: 2021-07-30

## 2021-07-30 RX ORDER — ZOLEDRONIC ACID 5 MG/100ML
5 INJECTION, SOLUTION INTRAVENOUS ONCE
Status: CANCELLED
Start: 2021-07-30 | End: 2021-07-30

## 2021-07-30 RX ORDER — HEPARIN SODIUM (PORCINE) LOCK FLUSH IV SOLN 100 UNIT/ML 100 UNIT/ML
5 SOLUTION INTRAVENOUS
Status: CANCELLED | OUTPATIENT
Start: 2021-07-30

## 2021-07-30 RX ORDER — METHYLPREDNISOLONE SODIUM SUCCINATE 125 MG/2ML
125 INJECTION, POWDER, LYOPHILIZED, FOR SOLUTION INTRAMUSCULAR; INTRAVENOUS
Status: CANCELLED
Start: 2021-07-30

## 2021-07-30 RX ORDER — HEPARIN SODIUM,PORCINE 10 UNIT/ML
5 VIAL (ML) INTRAVENOUS
Status: CANCELLED | OUTPATIENT
Start: 2021-07-30

## 2021-07-30 RX ORDER — MEPERIDINE HYDROCHLORIDE 25 MG/ML
25 INJECTION INTRAMUSCULAR; INTRAVENOUS; SUBCUTANEOUS EVERY 30 MIN PRN
Status: CANCELLED | OUTPATIENT
Start: 2021-07-30

## 2021-07-30 RX ORDER — ALBUTEROL SULFATE 0.83 MG/ML
2.5 SOLUTION RESPIRATORY (INHALATION)
Status: CANCELLED | OUTPATIENT
Start: 2021-07-30

## 2021-07-30 RX ORDER — ZOLEDRONIC ACID 5 MG/100ML
5 INJECTION, SOLUTION INTRAVENOUS ONCE
Status: COMPLETED | OUTPATIENT
Start: 2021-07-30 | End: 2021-07-30

## 2021-07-30 RX ORDER — NALOXONE HYDROCHLORIDE 0.4 MG/ML
0.2 INJECTION, SOLUTION INTRAMUSCULAR; INTRAVENOUS; SUBCUTANEOUS
Status: CANCELLED | OUTPATIENT
Start: 2021-07-30

## 2021-07-30 RX ORDER — ALBUTEROL SULFATE 90 UG/1
1-2 AEROSOL, METERED RESPIRATORY (INHALATION)
Status: CANCELLED
Start: 2021-07-30

## 2021-07-30 RX ADMIN — ZOLEDRONIC ACID 5 MG: 5 INJECTION, SOLUTION INTRAVENOUS at 09:57

## 2021-07-30 RX ADMIN — SODIUM CHLORIDE 250 ML: 9 INJECTION, SOLUTION INTRAVENOUS at 09:57

## 2021-07-30 ASSESSMENT — MIFFLIN-ST. JEOR: SCORE: 1059.12

## 2021-07-30 NOTE — TELEPHONE ENCOUNTER
Call returned from patient notified cologuard positive and colonoscopy need.  Patient will await a return call for colonoscopy to be scheduled.

## 2021-07-30 NOTE — PROGRESS NOTES
IV removed, catheter intact.  Site clean, dry and intact.  No signs or symptoms of infiltration or infection.  Covered with a sterile bandage, slight pressure applied for 30 seconds.  Pt instructed to leave bandage intact for a minimum of one hour, and to call with questions or concerns.  Copy of appointments, discharge instructions, and after visit summary (AVS) provided to patient.  Patient states understanding, discharged.

## 2021-07-30 NOTE — PROGRESS NOTES
24 gauge angio cath inserted into RT ARM.  Immediate blood return noted.  IV secured with sterile, transparent dressing and tape.  Patient tolerated well, denies pain or discomfort at this time.  Flushes easily without resistance, no signs or symptoms of infiltration or infection.  3 ml blood wasted and ONE tubes blood removed for ordered labs. Flushed with 3mL normal saline to clear line. Patient denies questions or concerns regarding infusion and/or medication(s) being administered.

## 2021-07-31 ENCOUNTER — MYC MEDICAL ADVICE (OUTPATIENT)
Dept: SURGERY | Facility: OTHER | Age: 66
End: 2021-07-31

## 2021-08-02 DIAGNOSIS — Z12.11 SPECIAL SCREENING FOR MALIGNANT NEOPLASMS, COLON: Primary | ICD-10-CM

## 2021-08-02 NOTE — TELEPHONE ENCOUNTER
MyChart message was received from patient. She had a positive Cologuard test, and would like to get a colonoscopy. I will reach out to patients PCP for a referral. The meet and greet process was reviewed with patient and she verbalized understanding. Jody Garcias LPN

## 2021-08-05 ENCOUNTER — TRANSFERRED RECORDS (OUTPATIENT)
Dept: HEALTH INFORMATION MANAGEMENT | Facility: CLINIC | Age: 66
End: 2021-08-05

## 2021-08-09 ENCOUNTER — PREP FOR PROCEDURE (OUTPATIENT)
Dept: SURGERY | Facility: OTHER | Age: 66
End: 2021-08-09

## 2021-08-09 ENCOUNTER — TELEPHONE (OUTPATIENT)
Dept: SURGERY | Facility: OTHER | Age: 66
End: 2021-08-09

## 2021-08-09 DIAGNOSIS — Z12.11 SPECIAL SCREENING FOR MALIGNANT NEOPLASMS, COLON: Primary | ICD-10-CM

## 2021-08-09 DIAGNOSIS — Z12.11 SPECIAL SCREENING FOR MALIGNANT NEOPLASMS, COLON: ICD-10-CM

## 2021-08-09 DIAGNOSIS — Z01.818 PREOP TESTING: Primary | ICD-10-CM

## 2021-08-09 RX ORDER — BISACODYL 5 MG
TABLET, DELAYED RELEASE (ENTERIC COATED) ORAL
Qty: 4 TABLET | Refills: 0 | Status: ON HOLD | OUTPATIENT
Start: 2021-08-09 | End: 2021-09-09

## 2021-08-09 NOTE — TELEPHONE ENCOUNTER
Referral received for colonoscopy for special screening for malignant neoplasms, colon.   This patient was approved by Marleny, surgery education RN for meet and greet colonoscopy without preop appointment.   Patient scheduled for colonoscopy on 9/9/21 with Dr. Panda at [facility] with Golytely bowel prep.   Instructions given via phone and instructions mailed to patient with surgery handbook.   COVID-19 test: 9/4/21  Preop: not needed  Jody Garcias LPN    Please sign RX for colon prep in this encounter.

## 2021-09-03 ENCOUNTER — ANESTHESIA EVENT (OUTPATIENT)
Dept: SURGERY | Facility: HOSPITAL | Age: 66
End: 2021-09-03
Payer: MEDICARE

## 2021-09-03 ASSESSMENT — LIFESTYLE VARIABLES: TOBACCO_USE: 1

## 2021-09-03 NOTE — ANESTHESIA PREPROCEDURE EVALUATION
Anesthesia Pre-Procedure Evaluation    Patient: Mechelle Fong   MRN: [id number] : 1955        Preoperative Diagnosis: Special screening for malignant neoplasms, colon [Z12.11]   Procedure : Procedure(s):  Colonoscopy, possible biopsy, possible polypectomy     Past Medical History:   Diagnosis Date     Depression 10/09/2001     Dysthymic disorder 10/22/2001      Past Surgical History:   Procedure Laterality Date     breast biopsy      RT     COLONOSCOPY       DILATION AND CURETTAGE      D&C     ESOPHAGOSCOPY, GASTROSCOPY, DUODENOSCOPY (EGD), COMBINED       excision      ovarian tumor, LT     HYSTERECTOMY TOTAL ABDOMINAL N/A     Cervix removed per patient.      RELEASE TRIGGER FINGER      RT     SALPINGO OOPHORECTOMY,R/L/DANIEL Left       Allergies   Allergen Reactions     Morphine Other (See Comments)     Hypotension      Paxil [Paroxetine Mesylate] Other (See Comments)     Intolerance  Paroxetine HCL          Social History     Tobacco Use     Smoking status: Former Smoker     Packs/day: 0.50     Years: 30.00     Pack years: 15.00     Types: Cigarettes     Start date: 1975     Quit date: 2019     Years since quittin.0     Smokeless tobacco: Never Used     Tobacco comment: quit on and off through the years   Substance Use Topics     Alcohol use: Yes     Comment: occasionally      Wt Readings from Last 1 Encounters:   21 58.2 kg (128 lb 4.9 oz)        Anesthesia Evaluation            ROS/MED HX  ENT/Pulmonary:     (+) tobacco use, Past use,     Neurologic:       Cardiovascular:       METS/Exercise Tolerance:     Hematologic:       Musculoskeletal:       GI/Hepatic:       Renal/Genitourinary:       Endo:       Psychiatric/Substance Use:     (+) psychiatric history anxiety and depression     Infectious Disease:       Malignancy:       Other:            Physical Exam    Airway        Mallampati: I   TM distance: > 3 FB   Neck ROM: full   Mouth opening: > 3 cm    Respiratory Devices and Support          Dental  no notable dental history         Cardiovascular   cardiovascular exam normal       Rhythm and rate: regular and normal     Pulmonary   pulmonary exam normal        breath sounds clear to auscultation           OUTSIDE LABS:  CBC:   Lab Results   Component Value Date    WBC 8.4 01/19/2020    WBC 7.9 10/02/2019    HGB 12.8 01/19/2020    HGB 13.6 10/02/2019    HCT 38.3 01/19/2020    HCT 40.3 10/02/2019     01/19/2020     10/02/2019     BMP:   Lab Results   Component Value Date     01/19/2020     10/02/2019    POTASSIUM 3.3 (L) 01/19/2020    POTASSIUM 4.0 10/02/2019    CHLORIDE 106 01/19/2020    CHLORIDE 106 10/02/2019    CO2 28 01/19/2020    CO2 28 10/02/2019    BUN 17 01/19/2020    BUN 10 10/02/2019    CR 0.64 07/30/2021    CR 0.61 01/19/2020    GLC 94 01/19/2020    GLC 82 10/02/2019     COAGS: No results found for: PTT, INR, FIBR  POC: No results found for: BGM, HCG, HCGS  HEPATIC:   Lab Results   Component Value Date    ALBUMIN 3.8 01/19/2020    PROTTOTAL 7.1 01/19/2020    ALT 29 01/19/2020    AST 29 01/19/2020    ALKPHOS 63 01/19/2020    BILITOTAL 0.2 01/19/2020     OTHER:   Lab Results   Component Value Date    KENNEDI 9.4 07/30/2021    TSH 0.86 10/02/2019       Anesthesia Plan    ASA Status:  2   NPO Status:  NPO Appropriate    Anesthesia Type: MAC.              Consents    Anesthesia Plan(s) and associated risks, benefits, and realistic alternatives discussed. Questions answered and patient/representative(s) expressed understanding.     - Discussed with:  Patient         Postoperative Care            Comments:                HUBER Casey CRNA

## 2021-09-04 ENCOUNTER — OFFICE VISIT (OUTPATIENT)
Dept: FAMILY MEDICINE | Facility: OTHER | Age: 66
End: 2021-09-04
Attending: SURGERY
Payer: MEDICARE

## 2021-09-04 DIAGNOSIS — Z01.818 PREOP TESTING: ICD-10-CM

## 2021-09-04 PROCEDURE — U0005 INFEC AGEN DETEC AMPLI PROBE: HCPCS | Mod: ZL

## 2021-09-05 LAB — SARS-COV-2 RNA RESP QL NAA+PROBE: NEGATIVE

## 2021-09-09 ENCOUNTER — ANESTHESIA (OUTPATIENT)
Dept: SURGERY | Facility: HOSPITAL | Age: 66
End: 2021-09-09
Payer: MEDICARE

## 2021-09-09 ENCOUNTER — HOSPITAL ENCOUNTER (OUTPATIENT)
Facility: HOSPITAL | Age: 66
Discharge: HOME | End: 2021-09-09
Attending: SURGERY | Admitting: SURGERY
Payer: MEDICARE

## 2021-09-09 VITALS
OXYGEN SATURATION: 98 % | HEART RATE: 70 BPM | WEIGHT: 128.31 LBS | DIASTOLIC BLOOD PRESSURE: 61 MMHG | RESPIRATION RATE: 16 BRPM | HEIGHT: 61 IN | BODY MASS INDEX: 24.22 KG/M2 | SYSTOLIC BLOOD PRESSURE: 123 MMHG | TEMPERATURE: 97.8 F

## 2021-09-09 DIAGNOSIS — Z12.11 SPECIAL SCREENING FOR MALIGNANT NEOPLASMS, COLON: ICD-10-CM

## 2021-09-09 PROCEDURE — 258N000003 HC RX IP 258 OP 636: Performed by: NURSE ANESTHETIST, CERTIFIED REGISTERED

## 2021-09-09 PROCEDURE — 710N000012 HC RECOVERY PHASE 2, PER MINUTE: Performed by: SURGERY

## 2021-09-09 PROCEDURE — 88305 TISSUE EXAM BY PATHOLOGIST: CPT | Mod: TC | Performed by: SURGERY

## 2021-09-09 PROCEDURE — 45380 COLONOSCOPY AND BIOPSY: CPT | Performed by: SURGERY

## 2021-09-09 PROCEDURE — 250N000011 HC RX IP 250 OP 636: Performed by: NURSE ANESTHETIST, CERTIFIED REGISTERED

## 2021-09-09 PROCEDURE — 360N000075 HC SURGERY LEVEL 2, PER MIN: Performed by: SURGERY

## 2021-09-09 PROCEDURE — 45385 COLONOSCOPY W/LESION REMOVAL: CPT | Performed by: NURSE ANESTHETIST, CERTIFIED REGISTERED

## 2021-09-09 PROCEDURE — 250N000009 HC RX 250: Performed by: NURSE ANESTHETIST, CERTIFIED REGISTERED

## 2021-09-09 PROCEDURE — 370N000017 HC ANESTHESIA TECHNICAL FEE, PER MIN: Performed by: SURGERY

## 2021-09-09 PROCEDURE — 999N000141 HC STATISTIC PRE-PROCEDURE NURSING ASSESSMENT: Performed by: SURGERY

## 2021-09-09 PROCEDURE — 272N000001 HC OR GENERAL SUPPLY STERILE: Performed by: SURGERY

## 2021-09-09 RX ORDER — FLUMAZENIL 0.1 MG/ML
0.2 INJECTION, SOLUTION INTRAVENOUS
Status: DISCONTINUED | OUTPATIENT
Start: 2021-09-09 | End: 2021-09-09 | Stop reason: HOSPADM

## 2021-09-09 RX ORDER — LIDOCAINE 40 MG/G
CREAM TOPICAL
Status: DISCONTINUED | OUTPATIENT
Start: 2021-09-09 | End: 2021-09-09 | Stop reason: HOSPADM

## 2021-09-09 RX ORDER — SODIUM CHLORIDE, SODIUM LACTATE, POTASSIUM CHLORIDE, CALCIUM CHLORIDE 600; 310; 30; 20 MG/100ML; MG/100ML; MG/100ML; MG/100ML
INJECTION, SOLUTION INTRAVENOUS CONTINUOUS
Status: DISCONTINUED | OUTPATIENT
Start: 2021-09-09 | End: 2021-09-09 | Stop reason: HOSPADM

## 2021-09-09 RX ORDER — NALOXONE HYDROCHLORIDE 0.4 MG/ML
0.2 INJECTION, SOLUTION INTRAMUSCULAR; INTRAVENOUS; SUBCUTANEOUS
Status: DISCONTINUED | OUTPATIENT
Start: 2021-09-09 | End: 2021-09-09 | Stop reason: HOSPADM

## 2021-09-09 RX ORDER — ONDANSETRON 2 MG/ML
4 INJECTION INTRAMUSCULAR; INTRAVENOUS EVERY 30 MIN PRN
Status: DISCONTINUED | OUTPATIENT
Start: 2021-09-09 | End: 2021-09-09 | Stop reason: HOSPADM

## 2021-09-09 RX ORDER — NALOXONE HYDROCHLORIDE 0.4 MG/ML
0.4 INJECTION, SOLUTION INTRAMUSCULAR; INTRAVENOUS; SUBCUTANEOUS
Status: DISCONTINUED | OUTPATIENT
Start: 2021-09-09 | End: 2021-09-09 | Stop reason: HOSPADM

## 2021-09-09 RX ORDER — ONDANSETRON 4 MG/1
4 TABLET, ORALLY DISINTEGRATING ORAL EVERY 30 MIN PRN
Status: DISCONTINUED | OUTPATIENT
Start: 2021-09-09 | End: 2021-09-09 | Stop reason: HOSPADM

## 2021-09-09 RX ORDER — ATROPINE SULFATE 0.4 MG/ML
AMPUL (ML) INJECTION PRN
Status: DISCONTINUED | OUTPATIENT
Start: 2021-09-09 | End: 2021-09-09

## 2021-09-09 RX ORDER — PROPOFOL 10 MG/ML
INJECTION, EMULSION INTRAVENOUS PRN
Status: DISCONTINUED | OUTPATIENT
Start: 2021-09-09 | End: 2021-09-09

## 2021-09-09 RX ORDER — LIDOCAINE HYDROCHLORIDE 20 MG/ML
INJECTION, SOLUTION INFILTRATION; PERINEURAL PRN
Status: DISCONTINUED | OUTPATIENT
Start: 2021-09-09 | End: 2021-09-09

## 2021-09-09 RX ADMIN — PROPOFOL 100 MCG/KG/MIN: 10 INJECTION, EMULSION INTRAVENOUS at 11:04

## 2021-09-09 RX ADMIN — ATROPINE SULFATE 0.6 MG: 0.4 INJECTION, SOLUTION INTRAMUSCULAR; INTRAVENOUS; SUBCUTANEOUS at 11:08

## 2021-09-09 RX ADMIN — PROPOFOL 70 MG: 10 INJECTION, EMULSION INTRAVENOUS at 11:02

## 2021-09-09 RX ADMIN — LIDOCAINE HYDROCHLORIDE 80 MG: 20 INJECTION, SOLUTION INFILTRATION; PERINEURAL at 11:02

## 2021-09-09 RX ADMIN — SODIUM CHLORIDE, POTASSIUM CHLORIDE, SODIUM LACTATE AND CALCIUM CHLORIDE: 600; 310; 30; 20 INJECTION, SOLUTION INTRAVENOUS at 09:52

## 2021-09-09 ASSESSMENT — MIFFLIN-ST. JEOR: SCORE: 1059.38

## 2021-09-09 NOTE — ANESTHESIA POSTPROCEDURE EVALUATION
Patient: Mechelle Fong    Procedure(s):  Colonoscopy, with polypectomy    Diagnosis:Special screening for malignant neoplasms, colon [Z12.11]  Diagnosis Additional Information: No value filed.    Anesthesia Type:  MAC    Note:  Disposition: Outpatient   Postop Pain Control: Uneventful            Sign Out: Well controlled pain   PONV: No   Neuro/Psych: Uneventful            Sign Out: Acceptable/Baseline neuro status   Airway/Respiratory: Uneventful            Sign Out: Acceptable/Baseline resp. status   CV/Hemodynamics: Uneventful            Sign Out: Acceptable CV status; No obvious hypovolemia; No obvious fluid overload   Other NRE: NONE   DID A NON-ROUTINE EVENT OCCUR? No           Last vitals:  Vitals Value Taken Time   /62 09/09/21 1145   Temp 97.2  F (36.2  C) 09/09/21 1126   Pulse 69 09/09/21 1145   Resp 16 09/09/21 1136   SpO2 90 % 09/09/21 1152   Vitals shown include unvalidated device data.    Electronically Signed By: HUBER Meza CRNA  September 9, 2021  11:54 AM

## 2021-09-09 NOTE — OP NOTE
Mechelle Fong MRN# [id number]   YOB: 1955 Age: 66 year old      Date of Admission:  9/9/2021  Date of Service:   9/09/21    Primary care provider: Bhavin Khan    PREOPERATIVE DIAGNOSIS:  Colon Cancer Screening        POSTOPERATIVE DIAGNOSIS:  Colon polyp x 1          PROCEDURE:  Colonoscopy w/ polypectomy           INDICATIONS:  Screening colonoscopy.      Specimen:   ID Type Source Tests Collected by Time Destination   1 :  Polyp Large Intestine, Colon, Transverse SURGICAL PATHOLOGY EXAM Gerber Panda MD 9/9/2021 11:15 AM        SURGEON: Gerber Panda MD    DESCRIPTION OF PROCEDURE: Mechelle Fong was brought into the endoscopy suite and placed in the left lateral decubitus position. After preprocedural pause and attended monitored anesthesia was administered, the external anus was inspected and was normal. Digital rectal exam was normal. The colonoscope was inserted and advanced under direct visualization to the level of the cecum which was identified by the appendiceal orifice and the ileocecal valve. The prep was excellent.. Upon slow withdrawal of the colonoscope, approximately 95% of the mucosa was directly visualized. There was a polyp in the transverse colon removed with a biopsy forceps.  The rest of the colon was without mucosal abnormality. There was no evidence of further polyps, inflammation, bleeding or AVMs. Retroflexion of the rectum was normal. The extra air was removed from the colon, and the colonoscope withdrawn. The patient tolerated the procedure well and was taken to postanesthesia care unit.     We invite the patient to return in 5-10 years for follow up screening evaluation, pending pathology related to polyp removed.    Gerber Panda MD

## 2021-09-09 NOTE — DISCHARGE INSTRUCTIONS

## 2021-09-09 NOTE — ANESTHESIA CARE TRANSFER NOTE
Patient: Mechelle Fong    Procedure(s):  Colonoscopy, with polypectomy    Diagnosis: Special screening for malignant neoplasms, colon [Z12.11]  Diagnosis Additional Information: No value filed.    Anesthesia Type:   MAC     Note:    Oropharynx: oropharynx clear of all foreign objects  Level of Consciousness: awake  Oxygen Supplementation: room air    Independent Airway: airway patency satisfactory and stable  Dentition: dentition unchanged  Vital Signs Stable: post-procedure vital signs reviewed and stable  Report to RN Given: handoff report given  Patient transferred to: Phase II    Handoff Report: Identifed the Patient, Identified the Reponsible Provider, Reviewed the pertinent medical history, Discussed the surgical course, Reviewed Intra-OP anesthesia mangement and issues during anesthesia, Set expectations for post-procedure period and Allowed opportunity for questions and acknowledgement of understanding      Vitals:  Vitals Value Taken Time   /53 09/09/21 1129   Temp     Pulse 81 09/09/21 1129   Resp     SpO2 97 % 09/09/21 1130   Vitals shown include unvalidated device data.    Electronically Signed By: HUBER Meza CRNA  September 9, 2021  11:31 AM

## 2021-09-09 NOTE — H&P
Surgery Consult Clinic Note      RE: Mechelle Fong  : 1955      Chief Complaint:  +Cologuard  2nd degree relative with colon cancer    History of Present Illness:  I am seeing Mechelle Fong at the request of Dr. Khan for evaluation regarding meet and greet screening colonoscopy.  She had a positive Cologuard on 2021.  Her maternal grandmother had colon cancer.  She denies blood in stool, changes in bowel habits, weight loss, abdominal pain.  Previous abdominal surgeries include hysterectomy.   She has no questions regarding  bowel prep.  Reports passing clear yellow liquid stools today.     She specifically denies fevers, chills, nausea, vomiting, chest pain, shortness of breath, palpitations, sore throat, cough, or generalized feeling ill.   She had a negative COVID 19 PCR test on 2021.    Medical history:  Past Medical History:   Diagnosis Date     Depression 10/09/2001     Dysthymic disorder 10/22/2001       Surgical history:  Past Surgical History:   Procedure Laterality Date     breast biopsy      RT     COLONOSCOPY       DILATION AND CURETTAGE      D&C     ESOPHAGOSCOPY, GASTROSCOPY, DUODENOSCOPY (EGD), COMBINED       excision      ovarian tumor, LT     HYSTERECTOMY TOTAL ABDOMINAL N/A     Cervix removed per patient.      RELEASE TRIGGER FINGER      RT     SALPINGO OOPHORECTOMY,R/L/DANIEL Left        Family history:  Family History   Problem Relation Age of Onset     Cerebrovascular Disease Father      Heart Disease Father 50        Heart failure, cause of death/Myocardial infarction     Cancer - colorectal Maternal Grandmother      Cancer Mother         Lung     Hypertension Brother        Medications:  Prior to Admission medications    Medication Sig Start Date End Date Taking? Authorizing Provider   bisacodyl (DULCOLAX) 5 MG EC tablet 2 tabs po at hs 2 night before surgery. 2 tabs at 3pm day before surgery. 21   Gerber Panda MD   calcium carbonate-vitamin D (OS-KENNEDI) 600-400 MG-UNIT  chewable tablet Take 1 chew tab by mouth 2 times daily 21   Bhavin Khan MD   clonazePAM (KLONOPIN) 0.5 MG tablet TAKE 1/2 TO 1 (ONE-HALF TO ONE) TABLET BY MOUTH TWICE DAILY AS NEEDED  Patient not taking: Reported on 2021   Bhavin Khan MD   FLUoxetine (PROZAC) 20 MG capsule Take 1 capsule (20 mg) by mouth daily 21   Bhavin Khan MD   polyethylene glycol (GOLYTELY) 236 g suspension Drink 8 oz q 10 mins until jug is 1/2 empty 6pm night prior to surgery. Refrigerate. Repeat 6 hours prior to surgery. 21   Gerber Panda MD       Allergies:  The patient is allergic to morphine, paxil [paroxetine mesylate], and trace metals.  .  Social history:  Social History     Tobacco Use     Smoking status: Former Smoker     Packs/day: 0.50     Years: 30.00     Pack years: 15.00     Types: Cigarettes     Start date: 1975     Quit date: 2019     Years since quittin.1     Smokeless tobacco: Never Used     Tobacco comment: quit on and off through the years   Substance Use Topics     Alcohol use: Yes     Comment: occasionally     Marital status: .    Review of Systems:    Constitutional: Negative for fever, chills.   HENT: Negative for ear pain, congestion, sore throat, and ear discharge.    Eyes: Negative for blurred vision, double vision.   Respiratory: Negative for cough, hemoptysis, shortness of breath, wheezing and stridor.    Cardiovascular: Negative for chest pain, palpitations and orthopnea.   Gastrointestinal: Negative for heartburn, nausea, vomiting, abdominal pain and blood in stool.   Genitourinary: Negative for urgency, frequency   Musculoskeletal: Negative for myalgias, back pain and joint pain.   Neurological: Negative for tingling, speech change and headaches.   Endo/Heme/Allergies: Does not bruise/bleed easily.   Psychiatric/Behavioral: Negative for depression, suicidal ideas and hallucinations. The patient is not nervous/anxious.    Physical  Examination:  /64 (BP Location: Left arm)   Pulse 70   Temp 98.6  F (37  C) (Tympanic)   Resp 18   SpO2 97%   General: Alert and orientedx4, no acute distress, well-developed/well-nourished, ambulating without assistance  HEENT: normocephalic atraumatic, extraocular movements intact, sclerae anicteric, Trachea mideline  Chest:   Clear to auscultation bilaterally.  Cardiac: S1S2 , regular rate and rhythm without additional sounds  Abdomen: Soft, non-tender, non-distended  Extremities: Cursory exam unremarkable.  No peripheral edema noted.  Skin: Warm, dry, less than 2 sec cap refill  Neuro: CN 2-12 grossly intact, no focal deficit, GCS 15  Psych: Pleasant, calm, asks appropriate questions      Assessment/Plan:  #1 Colonoscopy  #2 +Cologuard  #3 2nd degree relative with colon cancer    Mechelle Fong and I had a discussion about colonoscopies.  The indications, risks, benefits, althernatives and technical aspects of whole colon colonoscopy were outlined with risks including, but not limited to, perforation, bleeding and inability to visualize entire colon.  Management of each was reviewed including the risk for life saving surgery and possible admittance to the hospital.  Her questions were asked and answered.  We will proceed with colonoscopy with Dr. Panda as scheduled.  Kelly Angeles [facility] and [address]    [id number]    Referring Provider:  No referring provider defined for this encounter.     Primary Care Provider:  Bhavin Khan

## 2021-09-09 NOTE — OR NURSING
The patient chose to be discharged prior to provider visit. Discharged with her  Chong. Gait and balance steady.

## 2021-09-13 LAB
PATH REPORT.COMMENTS IMP SPEC: NORMAL
PATH REPORT.COMMENTS IMP SPEC: NORMAL
PATH REPORT.FINAL DX SPEC: NORMAL
PATH REPORT.GROSS SPEC: NORMAL
PATH REPORT.MICROSCOPIC SPEC OTHER STN: NORMAL
PHOTO IMAGE: NORMAL

## 2021-09-13 PROCEDURE — 88305 TISSUE EXAM BY PATHOLOGIST: CPT | Mod: 26 | Performed by: PATHOLOGY

## 2021-09-25 ENCOUNTER — HEALTH MAINTENANCE LETTER (OUTPATIENT)
Age: 66
End: 2021-09-25

## 2021-10-14 ENCOUNTER — LAB (OUTPATIENT)
Dept: LAB | Facility: OTHER | Age: 66
End: 2021-10-14
Payer: MEDICARE

## 2021-10-14 ENCOUNTER — MEDICAL CORRESPONDENCE (OUTPATIENT)
Dept: HEALTH INFORMATION MANAGEMENT | Facility: CLINIC | Age: 66
End: 2021-10-14

## 2021-10-14 DIAGNOSIS — M31.6 GIANT CELL ARTERITIS (H): Primary | ICD-10-CM

## 2021-10-14 LAB
BASOPHILS # BLD AUTO: 0.1 10E3/UL (ref 0–0.2)
BASOPHILS NFR BLD AUTO: 1 %
CRP SERPL-MCNC: <2.9 MG/L (ref 0–8)
EOSINOPHIL # BLD AUTO: 0.1 10E3/UL (ref 0–0.7)
EOSINOPHIL NFR BLD AUTO: 1 %
ERYTHROCYTE [DISTWIDTH] IN BLOOD BY AUTOMATED COUNT: 12.7 % (ref 10–15)
ERYTHROCYTE [SEDIMENTATION RATE] IN BLOOD BY WESTERGREN METHOD: 9 MM/HR (ref 0–30)
HCT VFR BLD AUTO: 43.2 % (ref 35–47)
HGB BLD-MCNC: 14 G/DL (ref 11.7–15.7)
IMM GRANULOCYTES # BLD: 0 10E3/UL
IMM GRANULOCYTES NFR BLD: 0 %
LYMPHOCYTES # BLD AUTO: 1.7 10E3/UL (ref 0.8–5.3)
LYMPHOCYTES NFR BLD AUTO: 19 %
MCH RBC QN AUTO: 30.4 PG (ref 26.5–33)
MCHC RBC AUTO-ENTMCNC: 32.4 G/DL (ref 31.5–36.5)
MCV RBC AUTO: 94 FL (ref 78–100)
MONOCYTES # BLD AUTO: 0.6 10E3/UL (ref 0–1.3)
MONOCYTES NFR BLD AUTO: 6 %
NEUTROPHILS # BLD AUTO: 6.8 10E3/UL (ref 1.6–8.3)
NEUTROPHILS NFR BLD AUTO: 73 %
NRBC # BLD AUTO: 0 10E3/UL
NRBC BLD AUTO-RTO: 0 /100
PLATELET # BLD AUTO: 443 10E3/UL (ref 150–450)
RBC # BLD AUTO: 4.6 10E6/UL (ref 3.8–5.2)
WBC # BLD AUTO: 9.3 10E3/UL (ref 4–11)

## 2021-10-14 PROCEDURE — 85025 COMPLETE CBC W/AUTO DIFF WBC: CPT | Mod: ZL

## 2021-10-14 PROCEDURE — 85652 RBC SED RATE AUTOMATED: CPT | Mod: ZL

## 2021-10-14 PROCEDURE — 86140 C-REACTIVE PROTEIN: CPT | Mod: ZL

## 2021-10-14 PROCEDURE — 36415 COLL VENOUS BLD VENIPUNCTURE: CPT | Mod: ZL

## 2021-10-23 ENCOUNTER — OFFICE VISIT (OUTPATIENT)
Dept: FAMILY MEDICINE | Facility: OTHER | Age: 66
End: 2021-10-23
Attending: FAMILY MEDICINE
Payer: MEDICARE

## 2021-10-23 ENCOUNTER — NURSE TRIAGE (OUTPATIENT)
Dept: FAMILY MEDICINE | Facility: OTHER | Age: 66
End: 2021-10-23

## 2021-10-23 DIAGNOSIS — Z20.822 SUSPECTED 2019 NOVEL CORONAVIRUS INFECTION: ICD-10-CM

## 2021-10-23 DIAGNOSIS — Z20.822 SUSPECTED 2019 NOVEL CORONAVIRUS INFECTION: Primary | ICD-10-CM

## 2021-10-23 PROCEDURE — U0003 INFECTIOUS AGENT DETECTION BY NUCLEIC ACID (DNA OR RNA); SEVERE ACUTE RESPIRATORY SYNDROME CORONAVIRUS 2 (SARS-COV-2) (CORONAVIRUS DISEASE [COVID-19]), AMPLIFIED PROBE TECHNIQUE, MAKING USE OF HIGH THROUGHPUT TECHNOLOGIES AS DESCRIBED BY CMS-2020-01-R: HCPCS | Mod: ZL

## 2021-10-23 NOTE — TELEPHONE ENCOUNTER
Reason for Disposition    COVID-19 Home Isolation, questions about    COVID-19 Testing, questions about    Additional Information    Negative: SEVERE difficulty breathing (e.g., struggling for each breath, speaks in single words)    Negative: Difficult to awaken or acting confused (e.g., disoriented, slurred speech)    Negative: Bluish (or gray) lips or face now    Negative: Shock suspected (e.g., cold/pale/clammy skin, too weak to stand, low BP, rapid pulse)    Negative: Sounds like a life-threatening emergency to the triager    Negative: [1] COVID-19 exposure AND [2] no symptoms    Negative: COVID-19 vaccine reaction suspected (e.g., fever, headache, muscle aches) occurring 1 to 3 days after getting vaccine    Negative: COVID-19 vaccine, questions about    Negative: [1] Lives with someone known to have influenza (flu test positive) AND [2] flu-like symptoms (e.g., cough, runny nose, sore throat, SOB; with or without fever)    Negative: [1] Adult with possible COVID-19 symptoms AND [2] triager concerned about severity of symptoms or other causes    Negative: COVID-19 and breastfeeding, questions about    Negative: SEVERE or constant chest pain or pressure (Exception: mild central chest pain, present only when coughing)    Negative: MODERATE difficulty breathing (e.g., speaks in phrases, SOB even at rest, pulse 100-120)    Negative: [1] Headache AND [2] stiff neck (can't touch chin to chest)    Negative: Fever > 103 F (39.4 C)    Negative: [1] Fever > 101 F (38.3 C) AND [2] age > 60 years    Negative: [1] Fever > 100.0 F (37.8 C) AND [2] bedridden (e.g., nursing home patient, CVA, chronic illness, recovering from surgery)    Negative: MILD difficulty breathing (e.g., minimal/no SOB at rest, SOB with walking, pulse <100)    Negative: Chest pain or pressure    Negative: Patient sounds very sick or weak to the triager    Negative: HIGH RISK for severe COVID complications (e.g., age > 64 years, obesity with BMI >  "25, pregnant, chronic lung disease or other chronic medical condition)  (Exception: Already seen by PCP and no new or worsening symptoms.)    Negative: [1] HIGH RISK patient AND [2] influenza is widespread in the community AND [3] ONE OR MORE respiratory symptoms: cough, sore throat, runny or stuffy nose    Negative: [1] HIGH RISK patient AND [2] influenza exposure within the last 7 days AND [3] ONE OR MORE respiratory symptoms: cough, sore throat, runny or stuffy nose    Negative: [1] COVID-19 infection suspected by caller or triager AND [2] mild symptoms (cough, fever, or others) AND [3] negative COVID-19 rapid test    Negative: Fever present > 3 days (72 hours)    Negative: [1] Fever returns after gone for over 24 hours AND [2] symptoms worse or not improved    Negative: [1] Continuous (nonstop) coughing interferes with work or school AND [2] no improvement using cough treatment per protocol    Negative: Cough present > 3 weeks    Negative: [1] COVID-19 diagnosed by positive lab test AND [2] NO symptoms (e.g., cough, fever, others)    Negative: [1] COVID-19 diagnosed by positive lab test AND [2] mild symptoms (e.g., cough, fever, others) AND [3] no complications or SOB    Negative: [1] COVID-19 diagnosed by doctor (or NP/PA) AND [2] mild symptoms (e.g., cough, fever, others) AND [3] no complications or SOB    Negative: [1] COVID-19 diagnosed AND [2] has mild nausea, vomiting or diarrhea    Answer Assessment - Initial Assessment Questions  1. COVID-19 DIAGNOSIS: \"Who made your Coronavirus (COVID-19) diagnosis?\" \"Was it confirmed by a positive lab test?\" If not diagnosed by a HCP, ask \"Are there lots of cases (community spread) where you live?\" (See public health department website, if unsure)      Not diagnosed  2. COVID-19 EXPOSURE: \"Was there any known exposure to COVID before the symptoms began?\" CDC Definition of close contact: within 6 feet (2 meters) for a total of 15 minutes or more over a 24-hour period. " "      yes  3. ONSET: \"When did the COVID-19 symptoms start?\"       Week ago  4. WORST SYMPTOM: \"What is your worst symptom?\" (e.g., cough, fever, shortness of breath, muscle aches)      headache  5. COUGH: \"Do you have a cough?\" If Yes, ask: \"How bad is the cough?\"        Yes mild cough  6. FEVER: \"Do you have a fever?\" If Yes, ask: \"What is your temperature, how was it measured, and when did it start?\"      no  7. RESPIRATORY STATUS: \"Describe your breathing?\" (e.g., shortness of breath, wheezing, unable to speak)       ok  8. BETTER-SAME-WORSE: \"Are you getting better, staying the same or getting worse compared to yesterday?\"  If getting worse, ask, \"In what way?\"      better  9. HIGH RISK DISEASE: \"Do you have any chronic medical problems?\" (e.g., asthma, heart or lung disease, weak immune system, obesity, etc.)      no  10. PREGNANCY: \"Is there any chance you are pregnant?\" \"When was your last menstrual period?\"        no  11. OTHER SYMPTOMS: \"Do you have any other symptoms?\"  (e.g., chills, fatigue, headache, loss of smell or taste, muscle pain, sore throat; new loss of smell or taste especially support the diagnosis of COVID-19)        Sore throat, fatigue    Protocols used: CORONAVIRUS (COVID-19) DIAGNOSED OR [id number] 8.25.2021      "

## 2021-10-24 LAB — SARS-COV-2 RNA RESP QL NAA+PROBE: NEGATIVE

## 2021-11-05 ASSESSMENT — ACTIVITIES OF DAILY LIVING (ADL): CURRENT_FUNCTION: NO ASSISTANCE NEEDED

## 2021-11-05 NOTE — PATIENT INSTRUCTIONS
Preventive Health Recommendations    See your health care provider every year to    Review health changes.     Discuss preventive care.      Review your medicines if your doctor has prescribed any.      You no longer need a yearly Pap test unless you've had an abnormal Pap test in the past 10 years. If you have vaginal symptoms, such as bleeding or discharge, be sure to talk with your provider about a Pap test.      Every 1 to 2 years, have a mammogram.  If you are over 69, talk with your health care provider about whether or not you want to continue having screening mammograms.      Every 10 years, have a colonoscopy. Or, have a yearly FIT test (stool test). These exams will check for colon cancer.       Have a cholesterol test every 5 years, or more often if your doctor advises it.       Have a diabetes test (fasting glucose) every three years. If you are at risk for diabetes, you should have this test more often.       At age 65, have a bone density scan (DEXA) to check for osteoporosis (brittle bone disease).    Shots:    Get a flu shot each year.    Get a tetanus shot every 10 years.    Talk to your doctor about your pneumonia vaccines. There are now two you should receive - Pneumovax (PPSV 23) and Prevnar (PCV 13).    Talk to your pharmacist about the shingles vaccine.    Talk to your doctor about the hepatitis B vaccine.    Nutrition:     Eat at least 5 servings of fruits and vegetables each day.      Eat whole-grain bread, whole-wheat pasta and brown rice instead of white grains and rice.      Get adequate about Calcium and Vitamin D.     Lifestyle    Exercise at least 150 minutes a week (30 minutes a day, 5 days a week). This will help you control your weight and prevent disease.      Limit alcohol to one drink per day.      No smoking.       Wear sunscreen to prevent skin cancer.       See your dentist twice a year for an exam and cleaning.      See your eye doctor every 1 to 2 years to screen for  conditions such as glaucoma, macular degeneration, cataracts, etc.    Personalized Prevention Plan  You are due for the preventive services outlined below.  Your care team is available to assist you in scheduling these services.  If you have already completed any of these items, please share that information with your care team to update in your medical record.    Health Maintenance Due   Topic Date Due     Hepatitis C Screening  Never done     Annual Wellness Visit  10/02/2020     Cholesterol Lab  10/02/2020     Discuss Advance Care Planning  05/11/2021     Flu Vaccine (1) 09/01/2021     Lung Cancer Screening   Frequently Asked Questions  If you are at high-risk for lung cancer, getting screened with low-dose computed tomography (LDCT) every year can help save your life. This handout offers answers to some of the most common questions about lung cancer screening. If you have other questions, please call [phone] ([phone]).     What is it?  Lung cancer screening uses special X-ray technology to create an image of your lung tissue. The exam is quick and easy and takes less than 10 seconds. We don t give you any medicine or use any needles. You can eat before and after the exam. You don t need to change your clothes as long as the clothing on your chest doesn t contain metal. But, you do need to be able to hold your breath for at least 6 seconds during the exam.    What is the goal of lung cancer screening?  The goal of lung cancer screening is to save lives. Many times, lung cancer is not found until a person starts having physical symptoms. Lung cancer screening can help detect lung cancer in the earliest stages when it may be easier to treat.    Who should be screened for lung cancer?  We suggest lung cancer screening for anyone who is at high-risk for lung cancer. You are in the high-risk group if you:      are between the ages of 55 and 79, and    have smoked at least 1 pack of cigarettes a day  for 30 or more years, and    still smoke or have quit within the past 15 years.    However, if you have a new cough or shortness of breath, you should talk to your doctor before being screened.    Some national lung health advocacy groups also recommend screening for people ages 50 to 79 who have smoked an average of 1 pack of cigarettes a day for 20 years. They must also have at least 1 other risk factor for lung cancer, not including exposure to secondhand smoke. Other risk factors are having had cancer in the past, emphysema, pulmonary fibrosis, COPD, a family history of lung cancer, or exposure to certain materials such as arsenic, asbestos, beryllium, cadmium, chromium, diesel fumes, nickel, radon or silica. Your care team can help you know if you have one of these risk factors.     Why does it matter if I have symptoms?  Certain symptoms can be a sign that you have a condition in your lungs that should be checked and treated by your doctor. These symptoms include fever, chest pain, a new or changing cough, shortness of breath that you have never felt before, coughing up blood or unexplained weight loss. Having any of these symptoms can greatly affect the results of lung cancer screening.       Should all smokers get an LDCT lung cancer screening exam?  It depends. Lung cancer screening is for a very specific group of men and women who have a history of heavy smoking over a long period of time (see  Who should be screened for lung cancer  above).  I am in the high-risk group, but have been diagnosed with cancer in the past. Is LDCT lung cancer screening right for me?  In some cases, you should not have LDCT lung screening, such as when your doctor is already following your cancer with CT scan studies. Your doctor will help you decide if LDCT lung screening is right for you.  Do I need to have a screening exam every year?  Yes. If you are in the high-risk group described earlier, you should get an LDCT lung  cancer screening exam every year until you are 79, or are no longer willing or able to undergo screening and possible procedures to diagnose and treat lung cancer.  How effective is LDCT at preventing death from lung cancer?  Studies have shown that LDCT lung cancer screening can lower the risk of death from lung cancer by 20 percent in people who are at high-risk.  What are the risks?  There are some risks and limitations of LDCT lung cancer screening. We want to make sure you understand the risks and benefits, so please let us know if you have any questions. Your doctor may want to talk with you more about these risks.    Radiation exposure: As with any exam that uses radiation, there is a very small increased risk of cancer. The amount of radiation in LDCT is small--about the same amount a person would get from a mammogram. Your doctor orders the exam when he or she feels the potential benefits outweigh the risks.    False negatives: No test is perfect, including LDCT. It is possible that you may have a medical condition, including lung cancer, that is not found during your exam. This is called a false negative result.    False positives and more testing: LDCT very often finds something in the lung that could be cancer, but in fact is not. This is called a false positive result. False positive tests often cause anxiety. To make sure these findings are not cancer, you may need to have more tests. These tests will be done only if you give us permission. Sometimes patients need a treatment that can have side effects, such as a biopsy. For more information on false positives, see  What can I expect from the results?     Findings not related to lung cancer: Your LDCT exam also takes pictures of areas of your body next to your lungs. In a very small number of cases, the CT scan will show an abnormal finding in one of these areas, such as your kidneys, adrenal glands, liver or thyroid. This finding may not be serious,  but you may need more tests. Your doctor can help you decide what other tests you may need, if any.  What can I expect from the results?  About 1 out of 4 LDCT exams will find something that may need more tests. Most of the time, these findings are lung nodules. Lung nodules are very small collections of tissue in the lung. These nodules are very common, and the vast majority--more than 97 percent--are not cancer (benign). Most are normal lymph nodes or small areas of scarring from past infections.  But, if a small lung nodule is found to be cancer, the cancer can be cured more than 90 percent of the time. To know if the nodule is cancer, we may need to get more images before your next yearly screening exam. If the nodule has suspicious features (for example, it is large, has an odd shape or grows over time), we will refer you to a specialist for further testing.  Will my doctor also get the results?  Yes. Your doctor will get a copy of your results.  Is it okay to keep smoking now that there s a cancer screening exam?  No. Tobacco is one of the strongest cancer-causing agents. It causes not only lung cancer, but other cancers and cardiovascular (heart) diseases as well. The damage caused by smoking builds over time. This means that the longer you smoke, the higher your risk of disease. While it is never too late to quit, the sooner you quit, the better.  Where can I find help to quit smoking?  The best way to prevent lung cancer is to stop smoking. If you have already quit smoking, congratulations and keep it up! For help on quitting smoking, please call [facility] at 3-432-910-HUBQ ([phone]) or the [employer] at 1-639.855.6333 to find local resources near you.  One-on-one health coaching:  If you d prefer to work individually with a health care provider on tobacco cessation, we offer:      Medication Therapy Management:  Our specially trained pharmacists work closely with you and your doctor to help you  quit smoking.  Call [phone] or [phone] (toll free).     Can Do: Health coaching offered by [facility].  [email or web address]

## 2021-11-05 NOTE — PROGRESS NOTES
"     SUBJECTIVE:   CC: Mechelle Fong is an 66 year old woman who presents for preventive health visit.       Patient has been advised of split billing requirements and indicates understanding: Yes  Healthy Habits:     In general, how would you rate your overall health?  Good    Frequency of exercise:  4-5 days/week    Duration of exercise:  45-60 minutes    Do you usually eat at least 4 servings of fruit and vegetables a day, include whole grains    & fiber and avoid regularly eating high fat or \"junk\" foods?  No    Taking medications regularly:  Yes    Medication side effects:  Other    Ability to successfully perform activities of daily living:  No assistance needed    Home Safety:  No safety concerns identified    Hearing Impairment:  Feel that people are mumbling or not speaking clearly    In the past 6 months, have you been bothered by leaking of urine? Yes    In general, how would you rate your overall mental or emotional health?  Good      PHQ-2 Total Score: 1    Additional concerns today:  Yes          Osteoporosis   reclast once yearly - she is not able to use any longer - she had a reaction to her vision causing inflammation   Vitamin D and calcium supplements  Mechelle underwent screening DEXA 5/24/21 which showed her to have a T score of -3.1 in the left hip consistent with osteoporosis.  This is reviewed in detail.  Calcium  Stable at 9.4 on 7/30/21    Depression and Anxiety Follow-Up    How are you doing with your depression since your last visit? No change    How are you doing with your anxiety since your last visit?  No change    Are you having other symptoms that might be associated with depression or anxiety? No    Have you had a significant life event? No     Do you have any concerns with your use of alcohol or other drugs? No     Fluoxetine 20 mg daily    clonazepam - she only uses as needed.  She usually only needs 1/4 to 1/2 tablet     Social History     Tobacco Use     Smoking status: Former " Smoker     Packs/day: 0.50     Years: 30.00     Pack years: 15.00     Types: Cigarettes     Start date: 1975     Quit date: 2019     Years since quittin.2     Smokeless tobacco: Never Used     Tobacco comment: quit on and off through the years   Substance Use Topics     Alcohol use: Yes     Comment: occasionally     Drug use: No     PHQ 2018   PHQ-9 Total Score 0 0 0   Q9: Thoughts of better off dead/self-harm past 2 weeks Not at all Not at all Not at all     MOOKIE-7 SCORE 2018   Total Score 4 0 0     Last PHQ-9 2021   1.  Little interest or pleasure in doing things 0   2.  Feeling down, depressed, or hopeless 0   3.  Trouble falling or staying asleep, or sleeping too much 0   4.  Feeling tired or having little energy 0   5.  Poor appetite or overeating 0   6.  Feeling bad about yourself 0   7.  Trouble concentrating 0   8.  Moving slowly or restless 0   Q9: Thoughts of better off dead/self-harm past 2 weeks 0   PHQ-9 Total Score 0   Difficulty at work, home, or with people -     MOOKIE-7  2021   1. Feeling nervous, anxious, or on edge 0   2. Not being able to stop or control worrying 0   3. Worrying too much about different things 0   4. Trouble relaxing 0   5. Being so restless that it is hard to sit still 0   6. Becoming easily annoyed or irritable 0   7. Feeling afraid, as if something awful might happen 0   MOOKIE-7 Total Score 0   If you checked any problems, how difficult have they made it for you to do your work, take care of things at home, or get along with other people? -       Suicide Assessment Five-step Evaluation and Treatment (SAFE-T)  \    Today's PHQ-2 Score:   PHQ-2 (  [employer]) 11/10/2021   Q1: Little interest or pleasure in doing things 0   Q2: Feeling down, depressed or hopeless 0   PHQ-2 Score 0   Q1: Little interest or pleasure in doing things -   Q2: Feeling down, depressed or hopeless -   PHQ-2 Score -       Abuse: Current or  Past (Physical, Sexual or Emotional) - No  Do you feel safe in your environment? Yes    Have you ever done Advance Care Planning? (For example, a Health Directive, POLST, or a discussion with a medical provider or your loved ones about your wishes): No, advance care planning information given to patient to review.  Patient plans to discuss their wishes with loved ones or provider.      Social History     Tobacco Use     Smoking status: Former Smoker     Packs/day: 0.50     Years: 30.00     Pack years: 15.00     Types: Cigarettes     Start date: 1975     Quit date: 2019     Years since quittin.2     Smokeless tobacco: Never Used     Tobacco comment: quit on and off through the years   Substance Use Topics     Alcohol use: Yes     Comment: occasionally     If you drink alcohol do you typically have >3 drinks per day or >7 drinks per week? No    No flowsheet data found.    Reviewed orders with patient.  Reviewed health maintenance and updated orders accordingly - Yes  Labs reviewed in EPIC  BP Readings from Last 3 Encounters:   11/10/21 114/78   21 123/61   21 125/73    Wt Readings from Last 3 Encounters:   11/10/21 58.1 kg (128 lb)   21 58.2 kg (128 lb 4.9 oz)   21 58.2 kg (128 lb 4.9 oz)                  Patient Active Problem List   Diagnosis     Major depressive disorder, recurrent episode, moderate (H)     ACP (advance care planning)     MOOKIE (generalized anxiety disorder)     Age-related osteoporosis without current pathological fracture     Past Surgical History:   Procedure Laterality Date     breast biopsy      RT     COLONOSCOPY       COLONOSCOPY N/A 2021    Procedure: Colonoscopy, with polypectomy;  Surgeon: Gerber Panda MD;  Location: [facility]     DILATION AND CURETTAGE      D&C     ESOPHAGOSCOPY, GASTROSCOPY, DUODENOSCOPY (EGD), COMBINED       excision      ovarian tumor, LT     HYSTERECTOMY TOTAL ABDOMINAL N/A     Cervix removed per patient.      RELEASE TRIGGER  FINGER      RT     SALPINGO OOPHORECTOMY,R/L/DANIEL Left        Social History     Tobacco Use     Smoking status: Former Smoker     Packs/day: 0.50     Years: 30.00     Pack years: 15.00     Types: Cigarettes     Start date: 1975     Quit date: 2019     Years since quittin.2     Smokeless tobacco: Never Used     Tobacco comment: quit on and off through the years   Substance Use Topics     Alcohol use: Yes     Comment: occasionally     Family History   Problem Relation Age of Onset     Cerebrovascular Disease Father      Heart Disease Father 50        Heart failure, cause of death/Myocardial infarction     Cancer - colorectal Maternal Grandmother      Cancer Mother         Lung     Hypertension Brother          Current Outpatient Medications   Medication Sig Dispense Refill     calcium carbonate-vitamin D (OS-KENNEDI) 600-400 MG-UNIT chewable tablet Take 1 chew tab by mouth 2 times daily 100 tablet 3     FLUoxetine (PROZAC) 20 MG capsule Take 1 capsule (20 mg) by mouth daily 90 capsule 1     clonazePAM (KLONOPIN) 0.5 MG tablet TAKE 1/2 TO 1 (ONE-HALF TO ONE) TABLET BY MOUTH TWICE DAILY AS NEEDED (Patient not taking: Reported on 2021) 60 tablet 0     Allergies   Allergen Reactions     Morphine Other (See Comments)     Hypotension      Paxil [Paroxetine Mesylate] Other (See Comments)     Intolerance  Paroxetine HCL         Trace Metals        Breast Cancer Screening:    FHS-7:   Breast CA Risk Assessment (FHS-7) 2021   Did any of your first-degree relatives have breast or ovarian cancer? No   Did any of your relatives have bilateral breast cancer? No   Did any man in your family have breast cancer? No   Did any woman in your family have breast and ovarian cancer? No   Did any woman in your family have breast cancer before age 50 y? No   Do you have 2 or more relatives with breast and/or ovarian cancer? No   Do you have 2 or more relatives with breast and/or bowel cancer? No       Mammogram Screening:  Recommended mammography every 1-2 years with patient discussion and risk factor consideration  Pertinent mammograms are reviewed under the imaging tab.    History of abnormal Pap smear: Status post benign hysterectomy. Health Maintenance and Surgical History updated.  No need for PAP no cervix      Reviewed and updated as needed this visit by clinical staff  Tobacco  Allergies  Meds   Med Hx  Surg Hx  Fam Hx  Soc Hx       Reviewed and updated as needed this visit by Provider                   Review of Systems  CONSTITUTIONAL: NEGATIVE for fever, chills, change in weight  INTEGUMENTARY/SKIN: NEGATIVE for worrisome rashes, moles or lesions  EYES: improving - working with ophthalmology   ENT: NEGATIVE for ear, mouth and throat problems  RESP: NEGATIVE for significant cough or SOB  BREAST: NEGATIVE for masses, tenderness or discharge  CV: hx palpitation   GI: NEGATIVE for nausea, abdominal pain, heartburn, or change in bowel habits  : NEGATIVE for unusual urinary or vaginal symptoms. No vaginal bleeding.  MUSCULOSKELETAL: NEGATIVE for significant arthralgias or myalgia  NEURO: NEGATIVE for weakness, dizziness or paresthesias  ENDOCRINE: NEGATIVE for temperature intolerance, skin/hair changes  HEME/ALLERGY/IMMUNE: NEGATIVE for bleeding problems  PSYCHIATRIC: anxiety      OBJECTIVE:   /78 (BP Location: Left arm, Patient Position: Chair, Cuff Size: Adult Regular)   Pulse 71   Temp 98.6  F (37  C) (Tympanic)   Resp 18   Wt 58.1 kg (128 lb)   SpO2 98%   BMI 24.19 kg/m    Physical Exam  GENERAL: healthy, alert and no distress  EYES: Eyes grossly normal to inspection, PERRL and conjunctivae and sclerae normal  HENT: ear canals and TM's normal, nose and mouth without ulcers or lesions  NECK: no adenopathy, no asymmetry, masses, or scars and thyroid normal to palpation  RESP: lungs clear to auscultation - no rales, rhonchi or wheezes  CV: regular rate and rhythm, normal S1 S2, no S3 or S4, no murmur, click  or rub, no peripheral edema and peripheral pulses strong  ABDOMEN: soft, nontender, no hepatosplenomegaly, no masses and bowel sounds normal  MS: no gross musculoskeletal defects noted, no edema  SKIN: no suspicious lesions or rashes  NEURO: Normal strength and tone, mentation intact and speech normal  PSYCH: mentation appears normal and affect normal/bright  LYMPH: normal ant/post cervical, supraclavicular nodes    Diagnostic Test Results:  Labs reviewed in Epic  Results for orders placed or performed in visit on 11/10/21 (from the past 24 hour(s))   TSH with free T4 reflex   Result Value Ref Range    TSH 1.17 0.40 - 4.00 mU/L   Lipid Profile (Chol, Trig, HDL, LDL calc)   Result Value Ref Range    Cholesterol 200 (H) <200 mg/dL    Triglycerides 117 <150 mg/dL    Direct Measure HDL 72 >=50 mg/dL    LDL Cholesterol Calculated 105 (H) <=100 mg/dL    Non HDL Cholesterol 128 <130 mg/dL    Patient Fasting > 8hrs? Yes     Narrative    Cholesterol  Desirable:  <200 mg/dL    Triglycerides  Normal:  Less than 150 mg/dL  Borderline High:  150-199 mg/dL  High:  200-499 mg/dL  Very High:  Greater than or equal to 500 mg/dL    Direct Measure HDL  Female:  Greater than or equal to 50 mg/dL   Male:  Greater than or equal to 40 mg/dL    LDL Cholesterol  Desirable:  <100mg/dL  Above Desirable:  100-129 mg/dL   Borderline High:  130-159 mg/dL   High:  160-189 mg/dL   Very High:  >= 190 mg/dL    Non HDL Cholesterol  Desirable:  130 mg/dL  Above Desirable:  130-159 mg/dL  Borderline High:  160-189 mg/dL  High:  190-219 mg/dL  Very High:  Greater than or equal to 220 mg/dL   CBC with platelets and differential    Narrative    The following orders were created for panel order CBC with platelets and differential.  Procedure                               Abnormality         Status                     ---------                               -----------         ------                     CBC with platelets and d...[[id number]]                       Final result                 Please view results for these tests on the individual orders.   Comprehensive metabolic panel (BMP + Alb, Alk Phos, ALT, AST, Total. Bili, TP)   Result Value Ref Range    Sodium 138 133 - 144 mmol/L    Potassium 4.4 3.4 - 5.3 mmol/L    Chloride 106 94 - 109 mmol/L    Carbon Dioxide (CO2) 31 20 - 32 mmol/L    Anion Gap 1 (L) 3 - 14 mmol/L    Urea Nitrogen 21 7 - 30 mg/dL    Creatinine 0.70 0.52 - 1.04 mg/dL    Calcium 9.3 8.5 - 10.1 mg/dL    Glucose 80 70 - 99 mg/dL    Alkaline Phosphatase 46 40 - 150 U/L    AST 13 0 - 45 U/L    ALT 16 0 - 50 U/L    Protein Total 7.3 6.8 - 8.8 g/dL    Albumin 3.5 3.4 - 5.0 g/dL    Bilirubin Total 0.4 0.2 - 1.3 mg/dL    GFR Estimate >90 >60 mL/min/1.73m2   CBC with platelets and differential   Result Value Ref Range    WBC Count 6.0 4.0 - 11.0 10e3/uL    RBC Count 4.28 3.80 - 5.20 10e6/uL    Hemoglobin 13.2 11.7 - 15.7 g/dL    Hematocrit 39.9 35.0 - 47.0 %    MCV 93 78 - 100 fL    MCH 30.8 26.5 - 33.0 pg    MCHC 33.1 31.5 - 36.5 g/dL    RDW 13.2 10.0 - 15.0 %    Platelet Count 390 150 - 450 10e3/uL    % Neutrophils 56 %    % Lymphocytes 29 %    % Monocytes 12 %    % Eosinophils 2 %    % Basophils 1 %    % Immature Granulocytes 0 %    NRBCs per 100 WBC 0 <1 /100    Absolute Neutrophils 3.3 1.6 - 8.3 10e3/uL    Absolute Lymphocytes 1.8 0.8 - 5.3 10e3/uL    Absolute Monocytes 0.7 0.0 - 1.3 10e3/uL    Absolute Eosinophils 0.1 0.0 - 0.7 10e3/uL    Absolute Basophils 0.1 0.0 - 0.2 10e3/uL    Absolute Immature Granulocytes 0.0 <=0.0 10e3/uL    Absolute NRBCs 0.0 10e3/uL       ASSESSMENT/PLAN:   (Z00.00) Routine general medical examination at a health care facility  (primary encounter diagnosis)  Comment: continue yearly well visits   Plan: Comprehensive metabolic panel (BMP + Alb, Alk         Phos, ALT, AST, Total. Bili, TP), CBC with         platelets and differential            (F33.1) Major depressive disorder, recurrent episode, moderate (H)  Comment:  stable continue current plan of care  Plan: Comprehensive metabolic panel (BMP + Alb, Alk         Phos, ALT, AST, Total. Bili, TP), CBC with         platelets and differential, TSH with free T4         reflex            (F41.1) MOOKIE (generalized anxiety disorder)  Comment: stable continue current plan of care  Continue to with clonazepam as needed   Plan: Comprehensive metabolic panel (BMP + Alb, Alk         Phos, ALT, AST, Total. Bili, TP), CBC with         platelets and differential, TSH with free T4         reflex            (M81.0) Age-related osteoporosis without current pathological fracture  Comment: did not tolerate reclast - continue with calcium and vitamin D  Recheck DEXA scan in a year   Plan: Comprehensive metabolic panel (BMP + Alb, Alk         Phos, ALT, AST, Total. Bili, TP)            (Z13.220) Screening cholesterol level  Comment: discussed diet and lifestyle changes  No need for statin at this time.  She would have concerns with starting a statin in the future if needed  Plan: Lipid Profile (Chol, Trig, HDL, LDL calc)          The 10-year ASCVD risk score (Joelidya GARCIA Jr., et al., 2013) is: 4.5%    Values used to calculate the score:      Age: 66 years      Sex: Female      Is Non- : No      Diabetic: No      Tobacco smoker: No      Systolic Blood Pressure: 114 mmHg      Is BP treated: No      HDL Cholesterol: 72 mg/dL      Total Cholesterol: 200 mg/dL      (Z87.891) Personal history of tobacco use  Comment: screening for Lung cancer.  Family history of lung cancer with her mom  Plan: Prof Fee: Shared Decision Making Visit for Lung        Cancer Screening, CT Chest Lung Cancer Scrn Low        Dose wo              Patient has been advised of split billing requirements and indicates understanding: No  COUNSELING:  Reviewed preventive health counseling, as reflected in patient instructions       Regular exercise       Healthy diet/nutrition    Estimated body mass index is 24.19  "kg/m  as calculated from the following:    Height as of 9/9/21: 1.549 m (5' 1\").    Weight as of this encounter: 58.1 kg (128 lb).        She reports that she quit smoking about 2 years ago. Her smoking use included cigarettes. She started smoking about 46 years ago. She has a 15.00 pack-year smoking history. She has never used smokeless tobacco.      Counseling Resources:  ATP IV Guidelines  Pooled Cohorts Equation Calculator  Breast Cancer Risk Calculator  BRCA-Related Cancer Risk Assessment: FHS-7 Tool  FRAX Risk Assessment  ICSI Preventive Guidelines  Dietary Guidelines for Americans, 2010  [employer] My[employer]ate  ASA Prophylaxis  Lung CA Screening    HUBER Hatfield CNP  [facility]  Lung Cancer Screening Shared Decision Making Visit     Mechelle Fong is eligible for lung cancer screening on the basis of the information provided in my signed lung cancer screening order.     I have discussed with patient the risks and benefits of screening for lung cancer with low-dose CT.     The risks include:  radiation exposure: one low dose chest CT has as much ionizing radiation as about 15 chest x-rays or 6 months of background radiation living in [place]    false positives: 96% of positive findings/nodules are NOT cancer, but some might still require additional diagnostic evaluation, including biopsy  over-diagnosis: some slow growing cancers that might never have been clinically significant will be detected and treated unnecessarily     The benefit of early detection of lung cancer is contingent upon adherence to annual screening or more frequent follow up if indicated.     Furthermore, reaping the benefits of screening requires Mechelle Fong to be willing and physically able to undergo diagnostic procedures, if indicated. Although no specific guide is available for determining severity of comorbidities, it is reasonable to withhold screening in patients who have greater mortality risk from " other diseases.     We did discuss that the only way to prevent lung cancer is to not smoke. Smoking cessation counseling was not given.      I did offer risk estimation using a calculator such as this one:    ShouldIScreen

## 2021-11-10 ENCOUNTER — OFFICE VISIT (OUTPATIENT)
Dept: FAMILY MEDICINE | Facility: OTHER | Age: 66
End: 2021-11-10
Attending: NURSE PRACTITIONER
Payer: COMMERCIAL

## 2021-11-10 VITALS
WEIGHT: 128 LBS | HEART RATE: 71 BPM | OXYGEN SATURATION: 98 % | SYSTOLIC BLOOD PRESSURE: 114 MMHG | BODY MASS INDEX: 24.19 KG/M2 | RESPIRATION RATE: 18 BRPM | DIASTOLIC BLOOD PRESSURE: 78 MMHG | TEMPERATURE: 98.6 F

## 2021-11-10 DIAGNOSIS — F33.1 MAJOR DEPRESSIVE DISORDER, RECURRENT EPISODE, MODERATE (H): ICD-10-CM

## 2021-11-10 DIAGNOSIS — Z13.220 SCREENING CHOLESTEROL LEVEL: ICD-10-CM

## 2021-11-10 DIAGNOSIS — F41.1 GAD (GENERALIZED ANXIETY DISORDER): ICD-10-CM

## 2021-11-10 DIAGNOSIS — Z87.891 PERSONAL HISTORY OF TOBACCO USE: ICD-10-CM

## 2021-11-10 DIAGNOSIS — M81.0 AGE-RELATED OSTEOPOROSIS WITHOUT CURRENT PATHOLOGICAL FRACTURE: ICD-10-CM

## 2021-11-10 DIAGNOSIS — Z00.00 ROUTINE GENERAL MEDICAL EXAMINATION AT A HEALTH CARE FACILITY: Primary | ICD-10-CM

## 2021-11-10 LAB
ALBUMIN SERPL-MCNC: 3.5 G/DL (ref 3.4–5)
ALP SERPL-CCNC: 46 U/L (ref 40–150)
ALT SERPL W P-5'-P-CCNC: 16 U/L (ref 0–50)
ANION GAP SERPL CALCULATED.3IONS-SCNC: 1 MMOL/L (ref 3–14)
AST SERPL W P-5'-P-CCNC: 13 U/L (ref 0–45)
BASOPHILS # BLD AUTO: 0.1 10E3/UL (ref 0–0.2)
BASOPHILS NFR BLD AUTO: 1 %
BILIRUB SERPL-MCNC: 0.4 MG/DL (ref 0.2–1.3)
BUN SERPL-MCNC: 21 MG/DL (ref 7–30)
CALCIUM SERPL-MCNC: 9.3 MG/DL (ref 8.5–10.1)
CHLORIDE BLD-SCNC: 106 MMOL/L (ref 94–109)
CHOLEST SERPL-MCNC: 200 MG/DL
CO2 SERPL-SCNC: 31 MMOL/L (ref 20–32)
CREAT SERPL-MCNC: 0.7 MG/DL (ref 0.52–1.04)
EOSINOPHIL # BLD AUTO: 0.1 10E3/UL (ref 0–0.7)
EOSINOPHIL NFR BLD AUTO: 2 %
ERYTHROCYTE [DISTWIDTH] IN BLOOD BY AUTOMATED COUNT: 13.2 % (ref 10–15)
FASTING STATUS PATIENT QL REPORTED: YES
GFR SERPL CREATININE-BSD FRML MDRD: >90 ML/MIN/1.73M2
GLUCOSE BLD-MCNC: 80 MG/DL (ref 70–99)
HCT VFR BLD AUTO: 39.9 % (ref 35–47)
HDLC SERPL-MCNC: 72 MG/DL
HGB BLD-MCNC: 13.2 G/DL (ref 11.7–15.7)
IMM GRANULOCYTES # BLD: 0 10E3/UL
IMM GRANULOCYTES NFR BLD: 0 %
LDLC SERPL CALC-MCNC: 105 MG/DL
LYMPHOCYTES # BLD AUTO: 1.8 10E3/UL (ref 0.8–5.3)
LYMPHOCYTES NFR BLD AUTO: 29 %
MCH RBC QN AUTO: 30.8 PG (ref 26.5–33)
MCHC RBC AUTO-ENTMCNC: 33.1 G/DL (ref 31.5–36.5)
MCV RBC AUTO: 93 FL (ref 78–100)
MONOCYTES # BLD AUTO: 0.7 10E3/UL (ref 0–1.3)
MONOCYTES NFR BLD AUTO: 12 %
NEUTROPHILS # BLD AUTO: 3.3 10E3/UL (ref 1.6–8.3)
NEUTROPHILS NFR BLD AUTO: 56 %
NONHDLC SERPL-MCNC: 128 MG/DL
NRBC # BLD AUTO: 0 10E3/UL
NRBC BLD AUTO-RTO: 0 /100
PLATELET # BLD AUTO: 390 10E3/UL (ref 150–450)
POTASSIUM BLD-SCNC: 4.4 MMOL/L (ref 3.4–5.3)
PROT SERPL-MCNC: 7.3 G/DL (ref 6.8–8.8)
RBC # BLD AUTO: 4.28 10E6/UL (ref 3.8–5.2)
SODIUM SERPL-SCNC: 138 MMOL/L (ref 133–144)
TRIGL SERPL-MCNC: 117 MG/DL
TSH SERPL DL<=0.005 MIU/L-ACNC: 1.17 MU/L (ref 0.4–4)
WBC # BLD AUTO: 6 10E3/UL (ref 4–11)

## 2021-11-10 PROCEDURE — 99397 PER PM REEVAL EST PAT 65+ YR: CPT | Performed by: NURSE PRACTITIONER

## 2021-11-10 PROCEDURE — G0296 VISIT TO DETERM LDCT ELIG: HCPCS | Performed by: NURSE PRACTITIONER

## 2021-11-10 PROCEDURE — 84443 ASSAY THYROID STIM HORMONE: CPT | Mod: ZL | Performed by: NURSE PRACTITIONER

## 2021-11-10 PROCEDURE — 80061 LIPID PANEL: CPT | Mod: ZL | Performed by: NURSE PRACTITIONER

## 2021-11-10 PROCEDURE — 80053 COMPREHEN METABOLIC PANEL: CPT | Mod: ZL | Performed by: NURSE PRACTITIONER

## 2021-11-10 PROCEDURE — 85025 COMPLETE CBC W/AUTO DIFF WBC: CPT | Mod: ZL | Performed by: NURSE PRACTITIONER

## 2021-11-10 PROCEDURE — 36415 COLL VENOUS BLD VENIPUNCTURE: CPT | Mod: ZL | Performed by: NURSE PRACTITIONER

## 2021-11-10 ASSESSMENT — PAIN SCALES - GENERAL: PAINLEVEL: NO PAIN (0)

## 2021-11-10 NOTE — NURSING NOTE
"Chief Complaint   Patient presents with     Physical       Initial /78 (BP Location: Left arm, Patient Position: Chair, Cuff Size: Adult Regular)   Pulse 71   Temp 98.6  F (37  C) (Tympanic)   Resp 18   Wt 58.1 kg (128 lb)   SpO2 98%   BMI 24.19 kg/m   Estimated body mass index is 24.19 kg/m  as calculated from the following:    Height as of 9/9/21: 1.549 m (5' 1\").    Weight as of this encounter: 58.1 kg (128 lb).  Medication Reconciliation: complete  Charleen Gutierrez LPN  "

## 2021-12-28 ENCOUNTER — HOSPITAL ENCOUNTER (OUTPATIENT)
Dept: CT IMAGING | Facility: HOSPITAL | Age: 66
End: 2021-12-28
Attending: NURSE PRACTITIONER
Payer: MEDICARE

## 2021-12-28 ENCOUNTER — ANCILLARY PROCEDURE (OUTPATIENT)
Dept: MAMMOGRAPHY | Facility: OTHER | Age: 66
End: 2021-12-28
Attending: SURGERY
Payer: MEDICARE

## 2021-12-28 ENCOUNTER — HOSPITAL ENCOUNTER (OUTPATIENT)
Dept: ULTRASOUND IMAGING | Facility: HOSPITAL | Age: 66
End: 2021-12-28
Attending: SURGERY
Payer: MEDICARE

## 2021-12-28 DIAGNOSIS — Z98.890 STATUS POST BREAST BIOPSY: ICD-10-CM

## 2021-12-28 DIAGNOSIS — R92.8 ABNORMAL MAMMOGRAM: ICD-10-CM

## 2021-12-28 DIAGNOSIS — Z87.891 PERSONAL HISTORY OF TOBACCO USE: ICD-10-CM

## 2021-12-28 PROCEDURE — 76642 ULTRASOUND BREAST LIMITED: CPT | Mod: RT

## 2021-12-28 PROCEDURE — 71271 CT THORAX LUNG CANCER SCR C-: CPT

## 2021-12-28 PROCEDURE — 77061 BREAST TOMOSYNTHESIS UNI: CPT | Mod: TC,RT

## 2022-11-12 DIAGNOSIS — F33.1 MAJOR DEPRESSIVE DISORDER, RECURRENT EPISODE, MODERATE (H): ICD-10-CM

## 2022-11-14 NOTE — TELEPHONE ENCOUNTER
Prozac     Last Written Prescription Date:  11.10.2021  Last Fill Quantity: 3,   # refills: 90  Last Office Visit: 11.10.2021  Future Office visit:    Next 5 appointments (look out 90 days)    Nov 21, 2022  8:00 AM  (Arrive by 7:45 AM)  PHYSICAL with HUBER Angel CNP  [facility] ([facility] [place] [facility] [address] [address]  [place] [address]  [phone]           Routing refill request to provider for review/approval because:  SSRIs Protocol Failed 11/12/2022 10:14 AM   Protocol Details  PHQ-9 score less than 5 in past 6 months     Keri Stevens RN

## 2022-11-18 ASSESSMENT — ENCOUNTER SYMPTOMS
DYSURIA: 0
HEMATURIA: 0
COUGH: 0
WEAKNESS: 0
CHILLS: 0
PALPITATIONS: 0
SORE THROAT: 0
HEMATOCHEZIA: 0
ABDOMINAL PAIN: 0
EYE PAIN: 0
PARESTHESIAS: 0
HEARTBURN: 0
NERVOUS/ANXIOUS: 0
NAUSEA: 0
CONSTIPATION: 0
FREQUENCY: 0
FEVER: 0
BREAST MASS: 0
DIZZINESS: 0
MYALGIAS: 1
JOINT SWELLING: 0
SHORTNESS OF BREATH: 0
ARTHRALGIAS: 0
HEADACHES: 1
DIARRHEA: 0

## 2022-11-18 ASSESSMENT — ACTIVITIES OF DAILY LIVING (ADL): CURRENT_FUNCTION: NO ASSISTANCE NEEDED

## 2022-11-21 ENCOUNTER — OFFICE VISIT (OUTPATIENT)
Dept: FAMILY MEDICINE | Facility: OTHER | Age: 67
End: 2022-11-21
Attending: NURSE PRACTITIONER
Payer: MEDICARE

## 2022-11-21 ENCOUNTER — TELEPHONE (OUTPATIENT)
Dept: FAMILY MEDICINE | Facility: OTHER | Age: 67
End: 2022-11-21

## 2022-11-21 ENCOUNTER — LAB (OUTPATIENT)
Dept: LAB | Facility: OTHER | Age: 67
End: 2022-11-21
Payer: COMMERCIAL

## 2022-11-21 VITALS
HEART RATE: 68 BPM | TEMPERATURE: 98.9 F | BODY MASS INDEX: 25.72 KG/M2 | WEIGHT: 131 LBS | SYSTOLIC BLOOD PRESSURE: 138 MMHG | OXYGEN SATURATION: 97 % | DIASTOLIC BLOOD PRESSURE: 70 MMHG | HEIGHT: 60 IN

## 2022-11-21 DIAGNOSIS — F41.1 GAD (GENERALIZED ANXIETY DISORDER): ICD-10-CM

## 2022-11-21 DIAGNOSIS — F33.1 MAJOR DEPRESSIVE DISORDER, RECURRENT EPISODE, MODERATE (H): ICD-10-CM

## 2022-11-21 DIAGNOSIS — H93.8X3 PLUGGED FEELING IN EAR, BILATERAL: ICD-10-CM

## 2022-11-21 DIAGNOSIS — Z13.220 LIPID SCREENING: ICD-10-CM

## 2022-11-21 DIAGNOSIS — R00.2 PALPITATIONS: ICD-10-CM

## 2022-11-21 DIAGNOSIS — Z00.00 ENCOUNTER FOR MEDICARE ANNUAL WELLNESS EXAM: Primary | ICD-10-CM

## 2022-11-21 DIAGNOSIS — M81.0 AGE-RELATED OSTEOPOROSIS WITHOUT CURRENT PATHOLOGICAL FRACTURE: ICD-10-CM

## 2022-11-21 LAB
ALBUMIN SERPL BCG-MCNC: 4.2 G/DL (ref 3.5–5.2)
ALP SERPL-CCNC: 57 U/L (ref 35–104)
ALT SERPL W P-5'-P-CCNC: 12 U/L (ref 10–35)
ANION GAP SERPL CALCULATED.3IONS-SCNC: 9 MMOL/L (ref 7–15)
AST SERPL W P-5'-P-CCNC: 20 U/L (ref 10–35)
BILIRUB SERPL-MCNC: 0.3 MG/DL
BUN SERPL-MCNC: 15.8 MG/DL (ref 8–23)
CALCIUM SERPL-MCNC: 8.9 MG/DL (ref 8.8–10.2)
CHLORIDE SERPL-SCNC: 101 MMOL/L (ref 98–107)
CHOLEST SERPL-MCNC: 214 MG/DL
CREAT SERPL-MCNC: 0.68 MG/DL (ref 0.51–0.95)
DEPRECATED HCO3 PLAS-SCNC: 28 MMOL/L (ref 22–29)
GFR SERPL CREATININE-BSD FRML MDRD: >90 ML/MIN/1.73M2
GLUCOSE SERPL-MCNC: 85 MG/DL (ref 70–99)
HDLC SERPL-MCNC: 69 MG/DL
LDLC SERPL CALC-MCNC: 128 MG/DL
NONHDLC SERPL-MCNC: 145 MG/DL
POTASSIUM SERPL-SCNC: 4.4 MMOL/L (ref 3.4–5.3)
PROT SERPL-MCNC: 6.7 G/DL (ref 6.4–8.3)
SODIUM SERPL-SCNC: 138 MMOL/L (ref 136–145)
TRIGL SERPL-MCNC: 86 MG/DL

## 2022-11-21 PROCEDURE — 80061 LIPID PANEL: CPT | Mod: ZL,XU

## 2022-11-21 PROCEDURE — 36415 COLL VENOUS BLD VENIPUNCTURE: CPT | Mod: ZL

## 2022-11-21 PROCEDURE — G0439 PPPS, SUBSEQ VISIT: HCPCS | Performed by: NURSE PRACTITIONER

## 2022-11-21 PROCEDURE — G0463 HOSPITAL OUTPT CLINIC VISIT: HCPCS | Mod: 25

## 2022-11-21 PROCEDURE — 80053 COMPREHEN METABOLIC PANEL: CPT | Mod: ZL

## 2022-11-21 RX ORDER — FLUTICASONE PROPIONATE 50 MCG
1 SPRAY, SUSPENSION (ML) NASAL DAILY
Qty: 16 G | Refills: 3 | Status: SHIPPED | OUTPATIENT
Start: 2022-11-21 | End: 2023-01-06

## 2022-11-21 ASSESSMENT — ANXIETY QUESTIONNAIRES
5. BEING SO RESTLESS THAT IT IS HARD TO SIT STILL: NOT AT ALL
7. FEELING AFRAID AS IF SOMETHING AWFUL MIGHT HAPPEN: NOT AT ALL
GAD7 TOTAL SCORE: 3
4. TROUBLE RELAXING: SEVERAL DAYS
IF YOU CHECKED OFF ANY PROBLEMS ON THIS QUESTIONNAIRE, HOW DIFFICULT HAVE THESE PROBLEMS MADE IT FOR YOU TO DO YOUR WORK, TAKE CARE OF THINGS AT HOME, OR GET ALONG WITH OTHER PEOPLE: NOT DIFFICULT AT ALL
2. NOT BEING ABLE TO STOP OR CONTROL WORRYING: SEVERAL DAYS
GAD7 TOTAL SCORE: 3
3. WORRYING TOO MUCH ABOUT DIFFERENT THINGS: NOT AT ALL
6. BECOMING EASILY ANNOYED OR IRRITABLE: NOT AT ALL
1. FEELING NERVOUS, ANXIOUS, OR ON EDGE: SEVERAL DAYS

## 2022-11-21 ASSESSMENT — PATIENT HEALTH QUESTIONNAIRE - PHQ9: SUM OF ALL RESPONSES TO PHQ QUESTIONS 1-9: 1

## 2022-11-21 ASSESSMENT — ACTIVITIES OF DAILY LIVING (ADL): CURRENT_FUNCTION: NO ASSISTANCE NEEDED

## 2022-11-21 NOTE — TELEPHONE ENCOUNTER
----- Message from HUBER Angel CNP sent at 11/21/2022  9:09 AM CST -----  Cholesterol and LDL (bad cholesterol) has increased slightly.  Risk score for stroke and heart disease has increased to 7.6 and goal is below 7.5.  she would benefit from a low dose statin if agrees I would suggest pravastatin for her.    Normal kidney and liver function on test  Normal glucose and potassium level

## 2022-11-21 NOTE — TELEPHONE ENCOUNTER
Patient would like to wait until she has heart monitor done before starting a statin.  I did explain that this med is for cholesterol.

## 2022-11-21 NOTE — PATIENT INSTRUCTIONS
Patient Education   Personalized Prevention Plan  You are due for the preventive services outlined below.  Your care team is available to assist you in scheduling these services.  If you have already completed any of these items, please share that information with your care team to update in your medical record.  Health Maintenance Due   Topic Date Due     Hepatitis C Screening  Never done     Pneumococcal Vaccine (2 - PCV) 10/02/2020     COVID-19 Vaccine (4 - Booster for Pfizer series) 11/25/2021     Anxiety Assessment  01/12/2022     Depression Assessment  01/12/2022     Cholesterol Lab  11/10/2022     FALL RISK ASSESSMENT  11/10/2022     LUNG CANCER SCREENING  12/28/2022

## 2022-11-21 NOTE — PROGRESS NOTES
"SUBJECTIVE:   Mechelle is a 67 year old who presents for Preventive Visit.  Patient has been advised of split billing requirements and indicates understanding: Yes  Are you in the first 12 months of your Medicare coverage?  No    Healthy Habits:     In general, how would you rate your overall health?  Good    Frequency of exercise:  4-5 days/week    Duration of exercise:  45-60 minutes    Do you usually eat at least 4 servings of fruit and vegetables a day, include whole grains    & fiber and avoid regularly eating high fat or \"junk\" foods?  No    Taking medications regularly:  Yes    Medication side effects:  Not applicable    Ability to successfully perform activities of daily living:  No assistance needed    Home Safety:  No safety concerns identified    Hearing Impairment:  Difficulty following a conversation in a noisy restaurant or crowded room and difficulty understanding soft or whispered speech    In the past 6 months, have you been bothered by leaking of urine? Yes    In general, how would you rate your overall mental or emotional health?  Good      PHQ-2 Total Score: 1    Additional concerns today:  No      Have you ever done Advance Care Planning? (For example, a Health Directive, POLST, or a discussion with a medical provider or your loved ones about your wishes): No, advance care planning information given to patient to review.  Patient declined advance care planning discussion at this time.       Fall risk  Fallen 2 or more times in the past year?: No  Any fall with injury in the past year?: No    Cognitive Screening   1) Repeat 3 items (Leader, Season, Table)    2) Clock draw: ABNORMAL katiana numbers to 11 and 10  3) 3 item recall: Recalls NO objects   Results: 0 items recalled: PROBABLE COGNITIVE IMPAIRMENT, **INFORM PROVIDER**    Mini-CogTM Copyright SHERRY Friedman. Licensed by the author for use in [facility]; reprinted with permission (juan@.[email or web address]). All rights reserved.      Do you have sleep " apnea, excessive snoring or daytime drowsiness?: no    Reviewed and updated as needed this visit by clinical staff   Tobacco  Allergies  Meds              Reviewed and updated as needed this visit by Provider                 Social History     Tobacco Use     Smoking status: Former     Packs/day: 0.50     Years: 30.00     Pack years: 15.00     Types: Cigarettes     Start date: 1/1/1975     Quit date: 8/1/2019     Years since quitting: 3.3     Smokeless tobacco: Never     Tobacco comments:     quit on and off through the years   Substance Use Topics     Alcohol use: Yes     Comment: occasionally     If you drink alcohol do you typically have >3 drinks per day or >7 drinks per week? No    No flowsheet data found.        Osteoporosis   reclast once yearly - she is not able to use any longer - she had a reaction to her vision causing inflammation   Vitamin D and calcium supplements  Mechelle underwent screening DEXA 5/24/21 which showed her to have a T score of -3.1 in the left hip consistent with osteoporosis.  This is reviewed in detail.  Calcium  Stable at 9.4 on 7/30/21  She is on a daily calcium and vitamin D supplement   Due to update DEXA     Depression and Anxiety Follow-Up    How are you doing with your depression since your last visit? No change    How are you doing with your anxiety since your last visit?  No change    Are you having other symptoms that might be associated with depression or anxiety? No    Have you had a significant life event? No     Do you have any concerns with your use of alcohol or other drugs? No    Fluoxetine 20 mg daily    clonazepam - she only uses as needed.  She usually only needs 1/4 to 1/2 tablet   Social History     Tobacco Use     Smoking status: Former     Packs/day: 0.50     Years: 30.00     Pack years: 15.00     Types: Cigarettes     Start date: 1/1/1975     Quit date: 8/1/2019     Years since quitting: 3.3     Smokeless tobacco: Never     Tobacco comments:     quit on and  off through the years   Substance Use Topics     Alcohol use: Yes     Comment: occasionally     Drug use: No     PHQ 5/19/2021 7/12/2021 11/21/2022   PHQ-9 Total Score 0 0 1   Q9: Thoughts of better off dead/self-harm past 2 weeks Not at all Not at all Not at all     MOOKIE-7 SCORE 5/19/2021 7/12/2021 11/21/2022   Total Score 0 0 3     Last PHQ-9 11/21/2022   1.  Little interest or pleasure in doing things 0   2.  Feeling down, depressed, or hopeless 1   3.  Trouble falling or staying asleep, or sleeping too much 0   4.  Feeling tired or having little energy 0   5.  Poor appetite or overeating 0   6.  Feeling bad about yourself 0   7.  Trouble concentrating 0   8.  Moving slowly or restless 0   Q9: Thoughts of better off dead/self-harm past 2 weeks 0   PHQ-9 Total Score 1   Difficulty at work, home, or with people Not difficult at all     MOOKIE-7  11/21/2022   1. Feeling nervous, anxious, or on edge 1   2. Not being able to stop or control worrying 1   3. Worrying too much about different things 0   4. Trouble relaxing 1   5. Being so restless that it is hard to sit still 0   6. Becoming easily annoyed or irritable 0   7. Feeling afraid, as if something awful might happen 0   MOOKIE-7 Total Score 3   If you checked any problems, how difficult have they made it for you to do your work, take care of things at home, or get along with other people? Not difficult at all       Suicide Assessment Five-step Evaluation and Treatment (SAFE-T)        Current providers sharing in care for this patient include:   Patient Care Team:  Claribel Mccoy APRN CNP as PCP - General (Family Medicine)  Claribel Mccoy APRN CNP as Assigned PCP    The following health maintenance items are reviewed in Epic and correct as of today:  Health Maintenance   Topic Date Due     HEPATITIS C SCREENING  Never done     LIPID  11/10/2022     LUNG CANCER SCREENING  12/28/2022     MOOKIE ASSESSMENT  05/21/2023     PHQ-9  05/21/2023     GARY   05/24/2023     MEDICARE ANNUAL WELLNESS VISIT  11/21/2023     FALL RISK ASSESSMENT  11/21/2023     MAMMO SCREENING  12/28/2023     COLORECTAL CANCER SCREENING  09/09/2026     ADVANCE CARE PLANNING  11/21/2027     DTAP/TDAP/TD IMMUNIZATION (2 - Td or Tdap) 05/19/2031     DEPRESSION ACTION PLAN  Completed     INFLUENZA VACCINE  Completed     Pneumococcal Vaccine: 65+ Years  Completed     ZOSTER IMMUNIZATION  Completed     COVID-19 Vaccine  Completed     IPV IMMUNIZATION  Aged Out     MENINGITIS IMMUNIZATION  Aged Out     Lab work is in process  BP Readings from Last 3 Encounters:   11/21/22 138/70   11/10/21 114/78   09/09/21 123/61    Wt Readings from Last 3 Encounters:   11/21/22 59.4 kg (131 lb)   11/10/21 58.1 kg (128 lb)   09/09/21 58.2 kg (128 lb 4.9 oz)                  Patient Active Problem List   Diagnosis     Major depressive disorder, recurrent episode, moderate (H)     ACP (advance care planning)     MOOKIE (generalized anxiety disorder)     Age-related osteoporosis without current pathological fracture     Past Surgical History:   Procedure Laterality Date     breast biopsy      RT     COLONOSCOPY       COLONOSCOPY N/A 9/9/2021    Procedure: Colonoscopy, with polypectomy;  Surgeon: Gerber Panda MD;  Location: [facility]     DILATION AND CURETTAGE      D&C     ESOPHAGOSCOPY, GASTROSCOPY, DUODENOSCOPY (EGD), COMBINED       excision      ovarian tumor, LT     HYSTERECTOMY TOTAL ABDOMINAL N/A     Cervix removed per patient.      RELEASE TRIGGER FINGER      RT     SALPINGO OOPHORECTOMY,R/L/DANIEL Left        Social History     Tobacco Use     Smoking status: Former     Packs/day: 0.50     Years: 30.00     Pack years: 15.00     Types: Cigarettes     Start date: 1/1/1975     Quit date: 8/1/2019     Years since quitting: 3.3     Smokeless tobacco: Never     Tobacco comments:     quit on and off through the years   Substance Use Topics     Alcohol use: Yes     Comment: occasionally     Family History   Problem Relation  Age of Onset     Cerebrovascular Disease Father      Heart Disease Father 50        Heart failure, cause of death/Myocardial infarction     Cancer - colorectal Maternal Grandmother      Cancer Mother         Lung     Hypertension Brother          Current Outpatient Medications   Medication Sig Dispense Refill     calcium carbonate-vitamin D (OS-KENNEDI) 600-400 MG-UNIT chewable tablet Take 1 chew tab by mouth 2 times daily 100 tablet 3     FLUoxetine (PROZAC) 20 MG capsule Take 1 capsule by mouth once daily 90 capsule 0     clonazePAM (KLONOPIN) 0.5 MG tablet TAKE 1/2 TO 1 (ONE-HALF TO ONE) TABLET BY MOUTH TWICE DAILY AS NEEDED (Patient not taking: Reported on 7/12/2021) 60 tablet 0     Allergies   Allergen Reactions     Morphine Other (See Comments)     Hypotension      Paxil [Paroxetine Mesylate] Other (See Comments)     Intolerance  Paroxetine HCL         Trace Metals      Mammogram Screening: due after december 28th -biopsy     FHS-7:   Breast CA Risk Assessment (FHS-7) 11/5/2021 11/18/2022   Did any of your first-degree relatives have breast or ovarian cancer? No No   Did any of your relatives have bilateral breast cancer? No No   Did any man in your family have breast cancer? No No   Did any woman in your family have breast and ovarian cancer? No No   Did any woman in your family have breast cancer before age 50 y? No No   Do you have 2 or more relatives with breast and/or ovarian cancer? No No   Do you have 2 or more relatives with breast and/or bowel cancer? No No         Pertinent mammograms are reviewed under the imaging tab.    Review of Systems  CONSTITUTIONAL: NEGATIVE for fever, chills, change in weight  INTEGUMENTARY/SKIN: NEGATIVE for worrisome rashes, moles or lesions  EYES: NEGATIVE for vision changes or irritation  ENT/MOUTH: NEGATIVE for ear, mouth and throat problems  RESP: NEGATIVE for significant cough or SOB  CV: NEGATIVE for chest pain or peripheral edema  POSITIVE for palpitations most days    GI: NEGATIVE for nausea, abdominal pain, heartburn, or change in bowel habits  : denies dysuria   MUSCULOSKELETAL: right upper shoulder pain since fall this past March - slowly improving   NEURO: NEGATIVE for weakness, dizziness or paresthesias and some forgetfulness (she does do brain exercise)   ENDOCRINE: NEGATIVE for temperature intolerance, skin/hair changes  PSYCHIATRIC: HX anxiety and HX depression    OBJECTIVE:   /70   Pulse 68   Temp 98.9  F (37.2  C) (Tympanic)   Ht 1.524 m (5')   Wt 59.4 kg (131 lb)   SpO2 97%   BMI 25.58 kg/m   Estimated body mass index is 25.58 kg/m  as calculated from the following:    Height as of this encounter: 1.524 m (5').    Weight as of this encounter: 59.4 kg (131 lb).  Physical Exam  GENERAL: healthy, alert and no distress  EYES: Eyes grossly normal to inspection, PERRL and conjunctivae and sclerae normal  HENT: normal cephalic/atraumatic, both ears: clear effusion, nose and mouth without ulcers or lesions, oropharynx clear and oral mucous membranes moist  NECK: no adenopathy, no asymmetry, masses, or scars and thyroid normal to palpation  RESP: lungs clear to auscultation - no rales, rhonchi or wheezes  CV: regular rate and rhythm, normal S1 S2, no S3 or S4, no murmur, click or rub, no peripheral edema and peripheral pulses strong  ABDOMEN: soft, nontender, no hepatosplenomegaly, no masses and bowel sounds normal  MS: no gross musculoskeletal defects noted, no edema  NEURO: Normal strength and tone, sensory exam grossly normal, mentation intact, speech normal and cranial nerves 2-12 intact  PSYCH: mentation appears normal, affect normal/bright  LYMPH: normal ant/post cervical, supraclavicular nodes    Diagnostic Test Results:  Labs reviewed in Epic  Results for orders placed or performed in visit on 11/21/22 (from the past 24 hour(s))   Comprehensive metabolic panel (BMP + Alb, Alk Phos, ALT, AST, Total. Bili, TP)   Result Value Ref Range    Sodium 138 136 -  145 mmol/L    Potassium 4.4 3.4 - 5.3 mmol/L    Chloride 101 98 - 107 mmol/L    Carbon Dioxide (CO2) 28 22 - 29 mmol/L    Anion Gap 9 7 - 15 mmol/L    Urea Nitrogen 15.8 8.0 - 23.0 mg/dL    Creatinine 0.68 0.51 - 0.95 mg/dL    Calcium 8.9 8.8 - 10.2 mg/dL    Glucose 85 70 - 99 mg/dL    Alkaline Phosphatase 57 35 - 104 U/L    AST 20 10 - 35 U/L    ALT 12 10 - 35 U/L    Protein Total 6.7 6.4 - 8.3 g/dL    Albumin 4.2 3.5 - 5.2 g/dL    Bilirubin Total 0.3 <=1.2 mg/dL    GFR Estimate >90 >60 mL/min/1.73m2   Lipid Profile (Chol, Trig, HDL, LDL calc)   Result Value Ref Range    Cholesterol 214 (H) <200 mg/dL    Triglycerides 86 <150 mg/dL    Direct Measure HDL 69 >=50 mg/dL    LDL Cholesterol Calculated 128 (H) <=100 mg/dL    Non HDL Cholesterol 145 (H) <130 mg/dL    Narrative    Cholesterol  Desirable:  <200 mg/dL    Triglycerides  Normal:  Less than 150 mg/dL  Borderline High:  150-199 mg/dL  High:  200-499 mg/dL  Very High:  Greater than or equal to 500 mg/dL    Direct Measure HDL  Female:  Greater than or equal to 50 mg/dL   Male:  Greater than or equal to 40 mg/dL    LDL Cholesterol  Desirable:  <100mg/dL  Above Desirable:  100-129 mg/dL   Borderline High:  130-159 mg/dL   High:  160-189 mg/dL   Very High:  >= 190 mg/dL    Non HDL Cholesterol  Desirable:  130 mg/dL  Above Desirable:  130-159 mg/dL  Borderline High:  160-189 mg/dL  High:  190-219 mg/dL  Very High:  Greater than or equal to 220 mg/dL       ASSESSMENT / PLAN:   (Z00.00) Encounter for Medicare annual wellness exam  (primary encounter diagnosis)  Comment: continue yearly screening       (M81.0) Age-related osteoporosis without current pathological fracture  Comment: waiting on DEXA scan update.  If still osteoporosis discussed referral to endocrine ([facility]) for treatment and further evaluation.  She did not tolerate reclast   Plan: DX Hip/Pelvis/Spine            (F41.1) MOOKIE (generalized anxiety disorder)  (F33.1) Major depressive disorder, recurrent  episode, moderate (H)  Comment: continue with self care with increased stress and anxiety   Continue with current medication   Plan: Comprehensive metabolic panel (BMP + Alb, Alk         Phos, ALT, AST, Total. Bili, TP)            (Z13.220) Lipid screening  Comment: consider starting low dose statin   Could try pravastatin with ASCVD score of 7.6%  Plan: Lipid Profile (Chol, Trig, HDL, LDL calc)        The 10-year ASCVD risk score (Eduardo MEDEROS, et al., 2019) is: 7.6%    Values used to calculate the score:      Age: 67 years      Sex: Female      Is Non- : No      Diabetic: No      Tobacco smoker: No      Systolic Blood Pressure: 138 mmHg      Is BP treated: No      HDL Cholesterol: 69 mg/dL      Total Cholesterol: 214 mg/dL      (R00.2) Palpitations  Comment: with frequent palpitations consider ZIO PATCH   Plan: Adult Leadless EKG Monitor 8 to 14 Days            (H93.8X3) Plugged feeling in ear, bilateral  Comment: plugged feeling in ears trial of nasal steroids   Plan: fluticasone (FLONASE) 50 MCG/ACT nasal spray                    COUNSELING:  Reviewed preventive health counseling, as reflected in patient instructions       Regular exercise       Healthy diet/nutrition       Vision screening       Fall risk prevention       Osteoporosis prevention/bone health        She reports that she quit smoking about 3 years ago. Her smoking use included cigarettes. She started smoking about 47 years ago. She has a 15.00 pack-year smoking history. She has never used smokeless tobacco.      Appropriate preventive services were discussed with this patient, including applicable screening as appropriate for cardiovascular disease, diabetes, osteopenia/osteoporosis, and glaucoma.  As appropriate for age/gender, discussed screening for colorectal cancer, prostate cancer, breast cancer, and cervical cancer. Checklist reviewing preventive services available has been given to the patient.    Reviewed patients  plan of care and provided an AVS. The Basic Care Plan (routine screening as documented in Health Maintenance) for Mechelle meets the Care Plan requirement. This Care Plan has been established and reviewed with the Patient.      HUBER [facility] - GERBER    Identified Health Risks:

## 2022-11-23 ENCOUNTER — HOSPITAL ENCOUNTER (OUTPATIENT)
Dept: CARDIOLOGY | Facility: HOSPITAL | Age: 67
Discharge: HOME | End: 2022-11-23
Attending: NURSE PRACTITIONER | Admitting: INTERNAL MEDICINE
Payer: MEDICARE

## 2022-11-23 DIAGNOSIS — R00.2 PALPITATIONS: ICD-10-CM

## 2022-11-23 PROCEDURE — 93248 EXT ECG>7D<15D REV&INTERPJ: CPT | Performed by: INTERNAL MEDICINE

## 2022-11-23 PROCEDURE — 93246 EXT ECG>7D<15D RECORDING: CPT

## 2022-12-09 ENCOUNTER — MYC MEDICAL ADVICE (OUTPATIENT)
Dept: FAMILY MEDICINE | Facility: OTHER | Age: 67
End: 2022-12-09

## 2022-12-09 DIAGNOSIS — F41.1 GAD (GENERALIZED ANXIETY DISORDER): ICD-10-CM

## 2022-12-09 RX ORDER — CLONAZEPAM 0.5 MG/1
TABLET ORAL
Qty: 30 TABLET | Refills: 0 | Status: SHIPPED | OUTPATIENT
Start: 2022-12-09 | End: 2023-11-22

## 2022-12-09 NOTE — TELEPHONE ENCOUNTER
Klonopin       Last Written Prescription Date:  5-19-21  Last Fill Quantity: 60,   # refills: 0  Last Office Visit: 11-21-22  Future Office visit:       Pt only wants 30 tablets as she states she has the script too long if we give 60

## 2022-12-12 DIAGNOSIS — R00.2 PALPITATIONS: Primary | ICD-10-CM

## 2022-12-12 NOTE — PROGRESS NOTES
Called Mechelle and updated on Zio patch   She had multiple runs of SVT with her palpitations referral to cardiology

## 2023-01-06 ENCOUNTER — OFFICE VISIT (OUTPATIENT)
Dept: CARDIOLOGY | Facility: OTHER | Age: 68
End: 2023-01-06
Attending: NURSE PRACTITIONER
Payer: COMMERCIAL

## 2023-01-06 ENCOUNTER — PATIENT OUTREACH (OUTPATIENT)
Dept: CARE COORDINATION | Facility: OTHER | Age: 68
End: 2023-01-06

## 2023-01-06 VITALS
BODY MASS INDEX: 25.91 KG/M2 | DIASTOLIC BLOOD PRESSURE: 78 MMHG | HEIGHT: 60 IN | OXYGEN SATURATION: 98 % | SYSTOLIC BLOOD PRESSURE: 142 MMHG | TEMPERATURE: 99.1 F | WEIGHT: 132 LBS | HEART RATE: 87 BPM | RESPIRATION RATE: 16 BRPM

## 2023-01-06 DIAGNOSIS — I47.10 SVT (SUPRAVENTRICULAR TACHYCARDIA) (H): ICD-10-CM

## 2023-01-06 DIAGNOSIS — I47.10 PAROXYSMAL SUPRAVENTRICULAR TACHYCARDIA (H): ICD-10-CM

## 2023-01-06 DIAGNOSIS — R00.2 PALPITATIONS: Primary | ICD-10-CM

## 2023-01-06 DIAGNOSIS — E78.5 HYPERLIPIDEMIA LDL GOAL <130: ICD-10-CM

## 2023-01-06 DIAGNOSIS — Z82.49 FAMILY HISTORY OF THORACIC AORTIC ANEURYSM: ICD-10-CM

## 2023-01-06 DIAGNOSIS — R03.0 ELEVATED BLOOD PRESSURE READING WITHOUT DIAGNOSIS OF HYPERTENSION: ICD-10-CM

## 2023-01-06 DIAGNOSIS — R00.2 PALPITATIONS: ICD-10-CM

## 2023-01-06 PROCEDURE — G0463 HOSPITAL OUTPT CLINIC VISIT: HCPCS

## 2023-01-06 PROCEDURE — 99204 OFFICE O/P NEW MOD 45 MIN: CPT | Performed by: NURSE PRACTITIONER

## 2023-01-06 RX ORDER — METOPROLOL SUCCINATE 25 MG/1
25 TABLET, EXTENDED RELEASE ORAL DAILY
Qty: 90 TABLET | Refills: 1 | Status: SHIPPED | OUTPATIENT
Start: 2023-01-06 | End: 2023-07-11

## 2023-01-06 ASSESSMENT — PAIN SCALES - GENERAL: PAINLEVEL: NO PAIN (0)

## 2023-01-06 NOTE — PROGRESS NOTES
"[facility] HEART CARE   CARDIOLOGY CONSULT     Mechelle Fong   [address]   [facility] [address]      [facility] [place]     Chief Complaint   Patient presents with     Results     Discuss Zio patch        HPI:   Mechelle Fong is a pleasant 67-year old female who presents for cardiology consult. She has no significant cardiac history. She has a non-cardiac history including osteoporosis, depression, anxiety.     Mrs. Fong tells me that she recently saw primary care provider and mentioned having palpitations. She tells me that she has had symptoms for many years. In that last 5-6 months symptoms of palpitations have become more frequent. She feels \"Shakey inside\" with pounding sensation in her chest. No significant lightheadedness, dizziness, near-syncope, syncopal episodes of recent. About 20 years ago she would have episodes of low blood pressure with associated syncope. Spent 3 nights in the ICU and was told she had episodes of low HR and low blood pressure. She tells me that she had a huge work-up of testing and were never able to find a cause for these episodes.     She does maintain active lifestyle- shovels snow, yoga, housework, walks, in the summer active in the yard, berry picking, cares for grandchildren- outside/playing. She tolerates these activities without symptoms of chest discomfort, dyspnea, dyspnea on exertion, palpitations, lightheaded, dizziness, near-syncope.     Overall, she endorses that she feels very well and does not feel limited in her daily activities.      Smoking History: Started smoking at age 16, quit smoking at age 48. Smoking 1 ppd.     Alcohol History: None    Substance Abuse History: None    Sleep History: Sleeps in bed with 1 pillow. No orthopnea, PND. She does tell me that she snores when on her back and does rouse herself awake. No witnessed apnea. Wakes up feeling well-rested most days of the week. She does tell me around 3/4 pm she starts to feel tired but this has " been longstanding and she does not take mid-day naps.     Family History: Father- MI with subsequent pacemaker and heart failure at age 47, lived until age 70. Bilateral carotid stenosis with carotid endarterectomy   Mother passed away from cancer at age 64- no known cardiac issues.   Five siblings: Oldest brother- hypertension. Sister- aortic aneurysm. Two brothers- atrial fibrillation.  Paternal grandfather-  of MI at age 61      RELEVANT TESTING:  Leadless EKG Monitor 2022  Zio XT patch report on Mechelle Fong.  Ordered secondary to palpitations.  Worn for 9 days and 21 hr's.  After removing artifact, total time was 9 days and 18 hr's. Placed on 22 at 11:37 am and completed on 12/3/22 at 8:09 am.   Underlying rhythm was sinus.   Hrt rate ranged from 51 bpm, maximum heart rate of 197 bmp, averaging 73 bmp.   No significant bradycardia, pauses, Mobitz type II or 3rd degree heart block.   No atrial fibrillation on this study.   x5 triggered events and x5 diary entries.  These corresponded to SVT, NSR, and SVEs.   x0 runs of VT.    x59 runs of SVT longest lasting 38.1 sec's with a maximum heart rate of 197 bmp.  Rare, <1% of PAC's, atrial couplets, atrial triplets, and PVC's.  No episodes of ventricular bigeminy present.  No episodes of ventricular trigeminy present.  Balbir Toney, DO         PAST MEDICAL HISTORY:   Past Medical History:   Diagnosis Date     Depression 10/09/2001     Dysthymic disorder 10/22/2001          FAMILY HISTORY:   Family History   Problem Relation Age of Onset     Cerebrovascular Disease Father      Heart Disease Father 50        Heart failure, cause of death/Myocardial infarction     Cancer - colorectal Maternal Grandmother      Cancer Mother         Lung     Hypertension Brother           PAST SURGICAL HISTORY:   Past Surgical History:   Procedure Laterality Date     breast biopsy      RT     COLONOSCOPY       COLONOSCOPY N/A 2021    Procedure: Colonoscopy, with  polypectomy;  Surgeon: Gerber Panda MD;  Location: [facility] OR     DILATION AND CURETTAGE      D&C     ESOPHAGOSCOPY, GASTROSCOPY, DUODENOSCOPY (EGD), COMBINED       excision      ovarian tumor, LT     HYSTERECTOMY TOTAL ABDOMINAL N/A     Cervix removed per patient.      RELEASE TRIGGER FINGER      RT     SALPINGO OOPHORECTOMY,R/L/DANIEL Left           SOCIAL HISTORY:   Social History     Socioeconomic History     Marital status:      Spouse name: None     Number of children: None     Years of education: None     Highest education level: None   Occupational History     Employer: [employer]     Comment: full-time   Tobacco Use     Smoking status: Former     Packs/day: 0.50     Years: 30.00     Pack years: 15.00     Types: Cigarettes     Start date: 1/1/1975     Quit date: 8/1/2019     Years since quitting: 3.4     Smokeless tobacco: Never     Tobacco comments:     quit on and off through the years   Substance and Sexual Activity     Alcohol use: Yes     Comment: occasionally     Drug use: No   Other Topics Concern     Caffeine Concern Yes     Comment: coffee, > 6 cups daily          CURRENT MEDICATIONS:   Current Outpatient Medications   Medication     calcium carbonate-vitamin D (OS-KENNEDI) 600-400 MG-UNIT chewable tablet     clonazePAM (KLONOPIN) 0.5 MG tablet     FLUoxetine (PROZAC) 20 MG capsule     metoprolol succinate ER (TOPROL XL) 25 MG 24 hr tablet     fluticasone (FLONASE) 50 MCG/ACT nasal spray     No current facility-administered medications for this visit.            ALLERGIES:   Allergies   Allergen Reactions     Morphine Other (See Comments)     Hypotension      Paxil [Paroxetine Mesylate] Other (See Comments)     Intolerance  Paroxetine HCL         Trace Metals           ROS:   CONSTITUTIONAL: No reported fever or chills. No changes in weight.  ENT: No visual disturbance, ear ache, epistaxis or sore throat.   CARDIOVASCULAR: +palpitations (See HPI). No chest pain, chest pressure or chest  discomfort. No lower extremity edema.   RESPIRATORY: No shortness of breath, dyspnea upon exertion, cough, wheezing or hemoptysis.   GI: No abdominal pain. No nausea, vomiting or diarrhea. No melena or hematochezia. No changes in bowel habits.   : No hematuria or dysuria. No hesitancy or incontinence.   NEUROLOGICAL: No headache, lightheadedness, dizziness, syncope, ataxia, paresthesias or weakness.   HEMATOLOGIC: No history of anemia. No bleeding or excessive bruising. No history of blood clots.   MUSCULOSKELETAL: No joint pain or swelling, no muscle pain.  ENDOCRINOLOGIC: No temperature intolerance. No hair or skin changes.  SKIN: No abnormal rashes or sores, no unusual itching.  PSYCHIATRIC: +history of anxiety. No changes in mood, feeling down or anxious. No changes in sleep.      PHYSICAL EXAM:     Vitals: BP (!) 142/78 (BP Location: Left arm, Patient Position: Sitting, Cuff Size: Adult Regular)   Pulse 87   Temp 99.1  F (37.3  C) (Tympanic)   Resp 16   Ht 1.524 m (5')   Wt 59.9 kg (132 lb)   SpO2 98%   BMI 25.78 kg/m    BMI= Body mass index is 25.78 kg/m .    GENERAL: The patient is a well-developed, well-nourished, in no apparent distress.  HEENT: Head is normocephalic and atraumatic. Eyes are symmetrical with normal visual tracking. No icterus, no xanthelasmas.  NECK: Supple. No adenopathy, asymmetry or masses. Carotid upstroke are normal bilaterally, no cervical bruits, JVP not visible.   CHEST/ LUNGS: Lungs clear to auscultation, no rales, rhonchi or wheezes, no use of accessory muscles, no retractions, respirations unlabored and normal respiratory rate.   CARDIO: Regular rate and rhythm normal with S1 and S2, no S3 or S4 and no murmur, click or rub. Precordium quiet with normal PMI.    ABD: Abdomen is soft and nontender, nondistended.  no palpable masses, no abdominal bruits heard.   EXTREMITIES: No clubbing, cyanosis or edema present. Peripheral pulses normal and equal bilaterally.  VASC:  Radial, femoral, dorsalis pedis and posterior tibialis pulses normal and symmetric with no vascular bruits heard.  MUSCULOSKELETAL: No joint swelling.   NEUROLOGIC: Alert and oriented X3. Normal speech, gait and affect. No focal neurologic deficits.   SKIN: No jaundice. No rashes or visible skin lesions present. No ecchymosis.            EKG Interpretation:      Rhythm: Normal sinus   Rate: Normal- 68 bpm  Axis: Normal  Ectopy: None  Conduction: normal YOLANDA 180 ms, normal QRS duration 76 ms, normal  ms, normal QTc 423 ms  ST Segments/ T Waves: No ST-T wave changes and No acute ischemic changes  Q Waves: None  Comparison to prior: No old EKG available      LAB RESULTS:   Orders placed or performed in visit on 01/06/23     metoprolol succinate ER (TOPROL XL) 25 MG 24 hr tablet          ASSESSMENT:   Mechelle Fong is a pleasant 67-year old female who presents for cardiology consult. She has no significant cardiac history. She has a non-cardiac history including osteoporosis, depression, anxiety.     Mrs. Fong has had a longstanding history of palpitations with recent increased episodes and symptoms in the last 5-6 months. We reviewed her zio patch showing symptomatic SVT and PACs.      PLAN:   1. Palpitations  2. SVT    Reviewed zio patch from 11/2022 showing episodes of symptomatic SVT and and PACs    Education provided to patient on SVT, PACs and management including symptomatic management with medications.    Start metoprolol XL 25 mg once daily in the evening    Transthoracic echocardiogram to evaluate heart structure and function. Family history of thoracic aortic aneurysm       3. Pure hypercholesterolemia    ASCVD risk 8%    CT chest screening for lung cancer with no coronary calcium identified    Continue with heart healthy lifestyle: heart healthy diet, at least 30 minutes of moderate intensity physical activity most days of the week (5-7 days per week)    Continue with surveillance. With family history  consider statin if LDL elevated        4.  Elevated blood pressure reading without diagnosis of hypertension    Blood pressure goal of less than 140 systolic (top number) and 90 diastolic (bottom number)    If blood pressure consistently elevated we should consider treating. She elects to monitor at home and will update us if consistently elevated above goal. Risks of untreated hypertension discussed today.    Monitor sodium intake. Reducing sodium intake can be helpful    Follow-up with cardiology in 6 months, certainly sooner with acute concern.       Thank you for allowing me to participate in the care of your patient. Please do not hesitate to contact me if you have any questions.     Liudmila Baker, CNP

## 2023-01-06 NOTE — PATIENT INSTRUCTIONS
PLAN:   Palpitations  SVT  Reviewed zio patch from 11/2022  Start metoprolol XL 25 mg once daily in the evening  Transthoracic echocardiogram to evaluate heart structure and function. Family history of thoracic aortic aneurysm       Pure hypercholesterolemia  ASCVD risk 8%  CT chest screening for lung cancer with no coronary calcium identified  Continue with heart healthy lifestyle: heart healthy diet, at least 30 minutes of moderate intensity physical activity most days of the week (5-7 days per week)  Continue with surveillance. With family history consider statin if LDL elevated  The 10-year ASCVD risk score (Eduardo MEDEROS, et al., 2019) is: 8%    Values used to calculate the score:      Age: 67 years      Sex: Female      Is Non- : No      Diabetic: No      Tobacco smoker: No      Systolic Blood Pressure: 142 mmHg      Is BP treated: No      HDL Cholesterol: 69 mg/dL      Total Cholesterol: 214 mg/dL       Elevated blood pressure reading without diagnosis of hypertension  Blood pressure goal of less than 140 systolic (top number) and 90 diastolic (bottom number)  If blood pressure consistently elevated we should consider treating   Monitor sodium intake. Reducing sodium intake can be helpful    Follow-up with cardiology in 6 months, certainly sooner with acute concern.       Thank you for allowing me to participate in the care of your patient. Please do not hesitate to contact me if you have any questions.     Liudmila Baker, CNP

## 2023-01-19 ENCOUNTER — HOSPITAL ENCOUNTER (OUTPATIENT)
Dept: CARDIOLOGY | Facility: HOSPITAL | Age: 68
Discharge: HOME | End: 2023-01-19
Attending: NURSE PRACTITIONER | Admitting: STUDENT IN AN ORGANIZED HEALTH CARE EDUCATION/TRAINING PROGRAM
Payer: MEDICARE

## 2023-01-19 DIAGNOSIS — Z82.49 FAMILY HISTORY OF THORACIC AORTIC ANEURYSM: ICD-10-CM

## 2023-01-19 DIAGNOSIS — R00.2 PALPITATIONS: ICD-10-CM

## 2023-01-19 DIAGNOSIS — I47.10 PAROXYSMAL SUPRAVENTRICULAR TACHYCARDIA (H): ICD-10-CM

## 2023-01-19 LAB
BI-PLANE LVEF ECHO: NORMAL
LVEF ECHO: NORMAL

## 2023-01-19 PROCEDURE — 93306 TTE W/DOPPLER COMPLETE: CPT

## 2023-02-13 ENCOUNTER — MYC REFILL (OUTPATIENT)
Dept: FAMILY MEDICINE | Facility: OTHER | Age: 68
End: 2023-02-13

## 2023-02-13 DIAGNOSIS — F33.1 MAJOR DEPRESSIVE DISORDER, RECURRENT EPISODE, MODERATE (H): ICD-10-CM

## 2023-05-16 ENCOUNTER — MYC REFILL (OUTPATIENT)
Dept: FAMILY MEDICINE | Facility: OTHER | Age: 68
End: 2023-05-16

## 2023-05-16 DIAGNOSIS — F33.1 MAJOR DEPRESSIVE DISORDER, RECURRENT EPISODE, MODERATE (H): ICD-10-CM

## 2023-07-11 ENCOUNTER — OFFICE VISIT (OUTPATIENT)
Dept: CARDIOLOGY | Facility: OTHER | Age: 68
End: 2023-07-11
Attending: NURSE PRACTITIONER
Payer: COMMERCIAL

## 2023-07-11 VITALS
HEIGHT: 60 IN | BODY MASS INDEX: 25.72 KG/M2 | HEART RATE: 63 BPM | WEIGHT: 131 LBS | RESPIRATION RATE: 16 BRPM | OXYGEN SATURATION: 98 % | DIASTOLIC BLOOD PRESSURE: 76 MMHG | SYSTOLIC BLOOD PRESSURE: 132 MMHG

## 2023-07-11 DIAGNOSIS — R03.0 ELEVATED BLOOD PRESSURE READING WITHOUT DIAGNOSIS OF HYPERTENSION: ICD-10-CM

## 2023-07-11 DIAGNOSIS — R00.2 PALPITATIONS: Primary | ICD-10-CM

## 2023-07-11 DIAGNOSIS — E78.5 HYPERLIPIDEMIA LDL GOAL <130: ICD-10-CM

## 2023-07-11 DIAGNOSIS — I47.10 PAROXYSMAL SUPRAVENTRICULAR TACHYCARDIA (H): ICD-10-CM

## 2023-07-11 PROCEDURE — 93010 ELECTROCARDIOGRAM REPORT: CPT | Mod: 76 | Performed by: NURSE PRACTITIONER

## 2023-07-11 PROCEDURE — 93005 ELECTROCARDIOGRAM TRACING: CPT | Performed by: NURSE PRACTITIONER

## 2023-07-11 PROCEDURE — G0463 HOSPITAL OUTPT CLINIC VISIT: HCPCS

## 2023-07-11 PROCEDURE — 99214 OFFICE O/P EST MOD 30 MIN: CPT | Performed by: NURSE PRACTITIONER

## 2023-07-11 ASSESSMENT — PAIN SCALES - GENERAL: PAINLEVEL: NO PAIN (0)

## 2023-07-11 NOTE — PATIENT INSTRUCTIONS
Thank you for allowing Liudmila Baker CNP and our  team to participate in your care. Please call our office at [phone] with scheduling questions or if you need to cancel or change your appointment. With any other questions or concerns you may call cardiology nurse at [phone] or [phone].       If you experience chest pain, chest pressure, chest tightness, shortness of breath, fainting, lightheadedness, nausea, vomiting, or other concerning symptoms, please report to the Emergency Department or call 911. These symptoms may be emergent, and best treated in the Emergency Department.

## 2023-07-11 NOTE — PROGRESS NOTES
[facility] HEART CARE   CARDIOLOGY PROGRESS NOTE     Chief Complaint   Patient presents with     Follow Up          Diagnosis:    ICD-10-CM    1. Palpitations  R00.2       2. Paroxysmal supraventricular tachycardia (H)  I47.1 EKG 12-lead complete w/read - (Clinic Performed)      3. Hyperlipidemia LDL goal <130  E78.5       4. Elevated blood pressure reading without diagnosis of hypertension  R03.0             Assessment/Plan:      1. Palpitations  2. SVT    Reviewed zio patch from 11/2022 showing episodes of symptomatic SVT and and PACs    Education provided to patient on SVT, PACs and management including symptomatic management with medications.    Previously tried metoprolol XL 25 mg once daily in the evening without improvement. Did not tolerate dosage increase due to fatigue.    Since she overall is tolerating without significant symptoms plan for monitoring and follow-up with any changes or concerns.    Transthoracic echocardiogram 1/2023     Normal LV systolic function with LVEF 60-65%    No significant valvular pathology      3. Pure hypercholesterolemia    ASCVD risk 7.8%    CT chest screening for lung cancer with no coronary calcium identified    Continue with heart healthy lifestyle: heart healthy diet, at least 30 minutes of moderate intensity physical activity most days of the week (5-7 days per week)    Continue with surveillance. With family history consider statin if LDL elevated    Recent Labs   Lab Test 11/21/22  0809 11/10/21  0829   CHOL 214* 200*   HDL 69 72   * 105*   TRIG 86 117     The 10-year ASCVD risk score (Eduardo MEDEROS, et al., 2019) is: 7.8%    Values used to calculate the score:      Age: 68 years      Sex: Female      Is Non- : No      Diabetic: No      Tobacco smoker: No      Systolic Blood Pressure: 132 mmHg      Is BP treated: No      HDL Cholesterol: 69 mg/dL      Total Cholesterol: 214 mg/dL        4.  Elevated blood pressure reading without diagnosis of  "hypertension    She monitored her blood pressures for about 1 month after prior visit with overall normal readings. Blood pressures improved today.     Monitor sodium intake. Reducing sodium intake can be helpful    BP Readings from Last 6 Encounters:   07/11/23 132/76   01/06/23 (!) 142/78   11/21/22 138/70   11/10/21 114/78   09/09/21 123/61   07/30/21 125/73         Follow-up with cardiology as needed.        Interval history:  Mechelle Fong is a pleasant 68-year old female who presents for cardiology follow-up. She has cardiac history including hypercholesterolemia, palpitations, SVT. She has a non-cardiac history including osteoporosis, depression, anxiety.     Today, Ms. Fong has no specific questions or concerns. She started metoprolol after prior visit without changes in symptoms of racing sensation in the evenings. We did increase dose and she noticed significant fatigue. After a couple weeks she stopped metoprolol succinate altogether. She has not had any changes in symptoms. Continues to tolerating daily activities of gardening, blueberry picking, routine walks up to 3+ miles daily without symptoms of chest discomfort, dyspnea, palpitations, lightheadedness or dizziness.     She does continues with racing sensation in her chest most evenings when she is at rest. \"It is short little episodes and doesn't last long.\" These episodes do not interfere with her sleeping and are not overly bothersome. She does not feel symptoms when active or busy. No Le edema. No episodes of syncope.      HPI:    Mechelle Fong is a pleasant 67-year old female who presents for cardiology consult. She has no significant cardiac history. She has a non-cardiac history including osteoporosis, depression, anxiety.     Mrs. Fong tells me that she recently saw primary care provider and mentioned having palpitations. She tells me that she has had symptoms for many years. In that last 5-6 months symptoms of palpitations have become more " "frequent. She feels \"Shakey inside\" with pounding sensation in her chest. No significant lightheadedness, dizziness, near-syncope, syncopal episodes of recent. About 20 years ago she would have episodes of low blood pressure with associated syncope. Spent 3 nights in the ICU and was told she had episodes of low HR and low blood pressure. She tells me that she had a huge work-up of testing and were never able to find a cause for these episodes.     She does maintain active lifestyle- shovels snow, yoga, housework, walks, in the summer active in the yard, berry picking, cares for grandchildren- outside/playing. She tolerates these activities without symptoms of chest discomfort, dyspnea, dyspnea on exertion, palpitations, lightheaded, dizziness, near-syncope.     Overall, she endorses that she feels very well and does not feel limited in her daily activities.      Smoking History: Started smoking at age 16, quit smoking at age 48. Smoking 1 ppd.     Alcohol History: None    Substance Abuse History: None    Sleep History: Sleeps in bed with 1 pillow. No orthopnea, PND. She does tell me that she snores when on her back and does rouse herself awake. No witnessed apnea. Wakes up feeling well-rested most days of the week. She does tell me around 3/4 pm she starts to feel tired but this has been longstanding and she does not take mid-day naps.     Family History: Father- MI with subsequent pacemaker and heart failure at age 47, lived until age 70. Bilateral carotid stenosis with carotid endarterectomy   Mother passed away from cancer at age 64- no known cardiac issues.   Five siblings: Oldest brother- hypertension. Sister- aortic aneurysm. Two brothers- atrial fibrillation.  Paternal grandfather-  of MI at age 61        RELEVANT TESTING:  Transthoracic Echocardiogram 2023  Interpretation Summary  Global and regional left ventricular function is normal with an EF of 60-65%.  The right ventricle is normal size. Global " right ventricular function is  normal.  IVC diameter <2.1 cm collapsing >50% with sniff suggests a normal RA pressure  of 3 mmHg.  No pericardial effusion is present.     There is no prior study for direct comparison.      Leadless EKG Monitor 11/2022  Zio XT patch report on Mechelle Fong.  Ordered secondary to palpitations.  Worn for 9 days and 21 hr's.  After removing artifact, total time was 9 days and 18 hr's. Placed on 11/23/22 at 11:37 am and completed on 12/3/22 at 8:09 am.   Underlying rhythm was sinus.   Hrt rate ranged from 51 bpm, maximum heart rate of 197 bmp, averaging 73 bmp.   No significant bradycardia, pauses, Mobitz type II or 3rd degree heart block.   No atrial fibrillation on this study.   x5 triggered events and x5 diary entries.  These corresponded to SVT, NSR, and SVEs.   x0 runs of VT.    x59 runs of SVT longest lasting 38.1 sec's with a maximum heart rate of 197 bmp.  Rare, <1% of PAC's, atrial couplets, atrial triplets, and PVC's.  No episodes of ventricular bigeminy present.  No episodes of ventricular trigeminy present.  Balbir Toney, DO        Past Medical History:   Diagnosis Date     Depression 10/09/2001     Dysthymic disorder 10/22/2001       Past Surgical History:   Procedure Laterality Date     breast biopsy      RT     COLONOSCOPY       COLONOSCOPY N/A 9/9/2021    Procedure: Colonoscopy, with polypectomy;  Surgeon: Gerber Panda MD;  Location: [facility] OR     DILATION AND CURETTAGE      D&C     ESOPHAGOSCOPY, GASTROSCOPY, DUODENOSCOPY (EGD), COMBINED       excision      ovarian tumor, LT     HYSTERECTOMY TOTAL ABDOMINAL N/A     Cervix removed per patient.      RELEASE TRIGGER FINGER      RT     SALPINGO OOPHORECTOMY,R/L/DANIEL Left        Allergies   Allergen Reactions     Morphine Other (See Comments)     Hypotension      Paxil [Paroxetine Mesylate] Other (See Comments)     Intolerance  Paroxetine HCL         Trace Minerals [id number]        Current Outpatient Medications    Medication Sig Dispense Refill     calcium carbonate-vitamin D (OS-KENNEDI) 600-400 MG-UNIT chewable tablet Take 1 chew tab by mouth 2 times daily 100 tablet 3     clonazePAM (KLONOPIN) 0.5 MG tablet TAKE 1/2 TO 1 (ONE-HALF TO ONE) TABLET BY MOUTH TWICE DAILY AS NEEDED 30 tablet 0     FLUoxetine (PROZAC) 20 MG capsule Take 1 capsule (20 mg) by mouth daily 90 capsule 0     metoprolol succinate ER (TOPROL XL) 25 MG 24 hr tablet Take 1 tablet (25 mg) by mouth daily 90 tablet 1       Social History     Socioeconomic History     Marital status:      Spouse name: Not on file     Number of children: Not on file     Years of education: Not on file     Highest education level: Not on file   Occupational History     Employer: [employer]     Comment: full-time   Tobacco Use     Smoking status: Former     Packs/day: 0.50     Years: 30.00     Pack years: 15.00     Types: Cigarettes     Start date: 1/1/1975     Quit date: 8/1/2019     Years since quitting: 3.9     Smokeless tobacco: Never     Tobacco comments:     quit on and off through the years   Substance and Sexual Activity     Alcohol use: Yes     Comment: occasionally     Drug use: No     Sexual activity: Not on file   Other Topics Concern      Service Not Asked     Blood Transfusions Not Asked     Caffeine Concern Yes     Comment: coffee, > 6 cups daily     Occupational Exposure Not Asked     Hobby Hazards Not Asked     Sleep Concern Not Asked     Stress Concern Not Asked     Weight Concern Not Asked     Special Diet Not Asked     Back Care Not Asked     Exercise Not Asked     Bike Helmet Not Asked     Seat Belt Not Asked     Self-Exams Not Asked     Parent/sibling w/ CABG, MI or angioplasty before 65F 55M? Not Asked   Social History Narrative     Not on file     Social Determinants of Health     Financial Resource Strain: Not on file   Food Insecurity: Not on file   Transportation Needs: Not on file   Physical Activity: Not on file   Stress:  Not on file   Social Connections: Not on file   Intimate Partner Violence: Not on file   Housing Stability: Not on file       LAB RESULTS:   Orders placed or performed in visit on 05/16/23     FLUoxetine (PROZAC) 20 MG capsule         Review of systems: Negative except that which was noted in the HPI.    Physical examination:    Vitals: /76   Pulse 63   Resp 16   Ht 1.524 m (5')   Wt 59.4 kg (131 lb)   SpO2 98%   BMI 25.58 kg/m    BMI= Body mass index is 25.58 kg/m .      GENERAL APPEARANCE: healthy, alert and no distress  HEENT: no icterus, no xanthelasmas, normal pupil size and reaction, no cyanosis.  NECK: no adenopathy, no asymmetry, masses.  CHEST: lungs clear to auscultation - no rales, rhonchi or wheezes, no use of accessory muscles, no retractions, respirations are unlabored, normal respiratory rate  CARDIOVASCULAR: regular rhythm, normal S1 with physiologic split S2, no S3 or S4 and no murmur, click or rub  EXTREMITIES: no clubbing, cyanosis or edema  NEURO: alert and oriented normal speech, and affect  VASC: No vascular bruits heard.  SKIN: no ecchymoses, no rashes        Thank you for allowing me to participate in the care of your patient. Please do not hesitate to contact me if you have any questions.       Liudmila Baker, CNP

## 2023-08-08 DIAGNOSIS — F33.1 MAJOR DEPRESSIVE DISORDER, RECURRENT EPISODE, MODERATE (H): ICD-10-CM

## 2023-08-10 NOTE — TELEPHONE ENCOUNTER
Prozac      Last Written Prescription Date:  05/18/23  Last Fill Quantity: 90,   # refills: 0  Last Office Visit: 11/21/22  Future Office visit:

## 2023-09-22 NOTE — TELEPHONE ENCOUNTER
Continued Stay SW/CM Assessment/Plan of Care Note     Progress note:  QUINTON following for discharge planning, participated in Oft's. Per RN, IV abx have been changed. Cardiology, wound care and Geriatrics following. Patient has been accepted to [facility] for SALVADOR. QUINTON and Dr. Lee met with patient this date to discuss dc planning, she is agreeable to SALVADOR at [facility].     QUINTON spoke with Cristina from [facility] admissions (Ph: [phone]) and updated her that patient will likely be ready for dc early next week. SW will fax updated therapy notes when available. Cristina stated that she will submit for insurance auth Monday morning.     SW following.      See SW/CM flowsheets for other objective data.    Disposition Recommendations:  Preliminary discharge destination: Planned Discharge Destination: Rehabilitation/Skilled Care  SW/CM recommendation for discharge: Less intensive rehab    Discharge Plan/Needs: SALVADOR    Continued Care and Services - Admitted Since 9/18/2023     Destination  Coordination complete.    Service Provider Request Status Selected Services Address Phone Fax    [facility]  Selected Skilled Nursing [address] [facility] [address] [phone]    [facility] PAN Pending - [facility] Sent N/A [address] [facility] [id number] [phone]    [facility] PAN Pending - Request Sent N/A [address] [facility] [address] [phone]    [facility] Pending - Request Sent N/A [address] [facility] [address] [phone]    [facility] P[facility] - Request Sent N/A [address] [facility] [facility] [id number] [phone]    [facility] Pending - Request Sent N/A [address] [facility] [address] [phone]    [facility] PAN Pending -  Message left on pts voicemail to change biopsy time to 1100 arrive at 1030.  Left message to please call us to confirm appt.   [facility] Sent N/A [address] [facility] [address] [phone]    [facility] - SNF Declined N/A [address] Replaced by [facility] [address] [phone]    [facility] Declined N/A [address] [facility] [address] [phone]    [facility] - [facility] Declined N/A [address] [facility] [address] [phone]    [facility] - SNF Declined N/A [address] [facility] [id number] [phone]    [facility] - SNF Declined N/A [address] [facility] [id number] [phone]    [facility] Declined  No contract with patient's insurance carrier N/A [address] [facility] [id number] [phone]              Prior To Hospitalization:    Living Situation: Spouse and residing at Apartment    .  Support Systems: Family members, Spouse   Home Devices/Equipment: Mobility assist device ,   ,    ,      Mobility Assist Devices: Standard walker, Wheelchair   Type of Service Prior to Hospitalization: None ,   ,   ,   ,    ,       Patient/Family discharge goal (s):  SNF     Resources provided:           Therapy Recommendations for Discharge:   PT:      Last Filed Values       Value Time User    PT Discharge Needs  therapy 5 or more times per week 9/19/2023 12:43 PM Betty Campbell        OT:       Last Filed Values       Value Time User    OT Discharge Needs  therapy 5 or more times per week 9/20/2023 10:26 AM Mick Rachel OT        SLP:    Last Filed Values     None          Mobility Equipment Recommended for Discharge: Pt owns a 2ww with bilateral UE platforms      Barriers to Discharge  Identified Barriers to Discharge/Transition Planning: Medical necessity for acute care

## 2023-11-07 ENCOUNTER — MYC REFILL (OUTPATIENT)
Dept: FAMILY MEDICINE | Facility: OTHER | Age: 68
End: 2023-11-07

## 2023-11-07 DIAGNOSIS — F33.1 MAJOR DEPRESSIVE DISORDER, RECURRENT EPISODE, MODERATE (H): ICD-10-CM

## 2023-11-08 NOTE — TELEPHONE ENCOUNTER
Prozac      Last Written Prescription Date:  8.10.23  Last Fill Quantity: #30,   # refills: 0  Last Office Visit: 11.21.22  Future Office visit:    Next 5 appointments (look out 90 days)      Nov 22, 2023  8:00 AM  (Arrive by 7:45 AM)  PHYSICAL with HUBER Angel CNP  [facility] [place] ([facility] [place] ) [address]  [place] [address]  [phone]             Routing refill request to provider for review/approval because:

## 2023-11-15 ASSESSMENT — ENCOUNTER SYMPTOMS
HEADACHES: 0
HEMATURIA: 0
CHILLS: 0
CONSTIPATION: 0
HEARTBURN: 0
EYE PAIN: 0
PALPITATIONS: 0
HEMATOCHEZIA: 0
COUGH: 0
SHORTNESS OF BREATH: 0
PARESTHESIAS: 0
NAUSEA: 0
DYSURIA: 0
FEVER: 0
DIARRHEA: 0
ABDOMINAL PAIN: 0
DIZZINESS: 0
ARTHRALGIAS: 0
FREQUENCY: 0
MYALGIAS: 0
JOINT SWELLING: 0
WEAKNESS: 0
SORE THROAT: 0
BREAST MASS: 0
NERVOUS/ANXIOUS: 0

## 2023-11-15 ASSESSMENT — ACTIVITIES OF DAILY LIVING (ADL): CURRENT_FUNCTION: NO ASSISTANCE NEEDED

## 2023-11-21 NOTE — PROGRESS NOTES
SUBJECTIVE:   Mechelle is a 68 year old, presenting for the following:  Wellness Visit        Are you in the first 12 months of your Medicare coverage?  No    HPI    Smoker : former smoker with Quit date of  she has 30 year 1/2 ppd smoker equal to 15 year PPD smoker does not qualify for Lung cancer screening   DEXA scan due - bad reaction to reclast     Have you ever done Advance Care Planning? (For example, a Health Directive, POLST, or a discussion with a medical provider or your loved ones about your wishes): No, advance care planning information given to patient to review.  Patient declined advance care planning discussion at this time.       Fall risk  Fallen 2 or more times in the past year?: Yes  Any fall with injury in the past year?: No  click delete button to remove this line now      Do you have sleep apnea, excessive snoring or daytime drowsiness? : no    Reviewed and updated as needed this visit by clinical staff   Tobacco  Allergies  Meds              Reviewed and updated as needed this visit by Provider                 Social History     Tobacco Use    Smoking status: Former     Packs/day: 0.50     Years: 30.00     Additional pack years: 0.00     Total pack years: 15.00     Types: Cigarettes     Start date: 1975     Quit date: 2019     Years since quittin.3     Passive exposure: Past    Smokeless tobacco: Never    Tobacco comments:     quit on and off through the years   Substance Use Topics    Alcohol use: Yes     Comment: occasionally             11/15/2023     9:17 AM   Alcohol Use   Prescreen: >3 drinks/day or >7 drinks/week? No          No data to display              Do you have a current opioid prescription? No  Do you use any other controlled substances or medications that are not prescribed by a provider? None          Osteoporosis   reclast once yearly - she is not able to use any longer - she had a reaction to her vision causing inflammation   Vitamin D and calcium  supplements  Mechelle underwent screening DEXA 21 which showed her to have a T score of -3.1 in the left hip consistent with osteoporosis.  This is reviewed in detail.  Calcium  Stable at 9.4 on 21  She is on a daily calcium and vitamin D supplement   Due to update DEXA     Palpitations  Was seen by cardiology to follow up for [place] patch noted to have SVT and PACs starte on Metoprolol XL 25 mg daily, but was stopped when she had increased dose caused increased fatigue.      Depression and Anxiety Follow-Up  How are you doing with your depression since your last visit? No change  How are you doing with your anxiety since your last visit?  No change  Are you having other symptoms that might be associated with depression or anxiety? No  Have you had a significant life event? No   Do you have any concerns with your use of alcohol or other drugs? No  Fluoxetine 20 mg daily  clonazepam - she only uses as needed.  She usually only needs 1/4 to 1/2 tablet     Social History     Tobacco Use    Smoking status: Former     Packs/day: 0.50     Years: 30.00     Additional pack years: 0.00     Total pack years: 15.00     Types: Cigarettes     Start date: 1975     Quit date: 2019     Years since quittin.3     Passive exposure: Past    Smokeless tobacco: Never    Tobacco comments:     quit on and off through the years   Vaping Use    Vaping Use: Never used   Substance Use Topics    Alcohol use: Yes     Comment: occasionally    Drug use: No         2021     8:00 AM 2022     8:15 AM 2023     7:47 AM   PHQ   PHQ-9 Total Score 0 1 4   Q9: Thoughts of better off dead/self-harm past 2 weeks Not at all Not at all Not at all         2021     8:00 AM 2022     8:15 AM 2023     7:48 AM   MOOKIE-7 SCORE   Total Score   7 (mild anxiety)   Total Score 0 3 7         2023     7:47 AM   Last PHQ-9   1.  Little interest or pleasure in doing things 1   2.  Feeling down, depressed, or hopeless 0    3.  Trouble falling or staying asleep, or sleeping too much 0   4.  Feeling tired or having little energy 1   5.  Poor appetite or overeating 0   6.  Feeling bad about yourself 1   7.  Trouble concentrating 1   8.  Moving slowly or restless 0   Q9: Thoughts of better off dead/self-harm past 2 weeks 0   PHQ-9 Total Score 4         11/22/2023     7:48 AM   MOOKIE-7    1. Feeling nervous, anxious, or on edge 1   2. Not being able to stop or control worrying 1   3. Worrying too much about different things 1   4. Trouble relaxing 1   5. Being so restless that it is hard to sit still 1   6. Becoming easily annoyed or irritable 1   7. Feeling afraid, as if something awful might happen 1   MOOKIE-7 Total Score 7   If you checked any problems, how difficult have they made it for you to do your work, take care of things at home, or get along with other people? Somewhat difficult       Suicide Assessment Five-step Evaluation and Treatment (SAFE-T)      Current providers sharing in care for this patient include:   Patient Care Team:  Claribel Mccoy APRN CNP as PCP - General (Family Medicine)  Claribel Mccoy APRN CNP as Assigned PCP  Liudmila Baker CNP as Assigned Heart and Vascular Provider    The following health maintenance items are reviewed in Epic and correct as of today:  Health Maintenance   Topic Date Due    HEPATITIS C SCREENING  Never done    RSV VACCINE (Pregnancy & 60+) (1 - 1-dose 60+ series) Never done    LUNG CANCER SCREENING  12/28/2022    DEXA  05/24/2023    LIPID  11/21/2023    MEDICARE ANNUAL WELLNESS VISIT  11/21/2023    MAMMO SCREENING  12/28/2023    MOOKIE ASSESSMENT  05/22/2024    PHQ-9  05/22/2024    FALL RISK ASSESSMENT  11/22/2024    COLORECTAL CANCER SCREENING  09/09/2026    ADVANCE CARE PLANNING  11/22/2028    DTAP/TDAP/TD IMMUNIZATION (2 - Td or Tdap) 05/19/2031    DEPRESSION ACTION PLAN  Completed    INFLUENZA VACCINE  Completed    Pneumococcal Vaccine: 65+ Years  Completed     ZOSTER IMMUNIZATION  Completed    COVID-19 Vaccine  Completed    IPV IMMUNIZATION  Aged Out    HPV IMMUNIZATION  Aged Out    MENINGITIS IMMUNIZATION  Aged Out    RSV MONOCLONAL ANTIBODY  Aged Out     Lab work is in process  BP Readings from Last 3 Encounters:   23 132/76   23 (!) 142/78   22 138/70    Wt Readings from Last 3 Encounters:   23 60.8 kg (134 lb)   23 59.4 kg (131 lb)   23 59.9 kg (132 lb)                  Patient Active Problem List   Diagnosis    Major depressive disorder, recurrent episode, moderate (H)    ACP (advance care planning)    MOOKIE (generalized anxiety disorder)    Age-related osteoporosis without current pathological fracture     Past Surgical History:   Procedure Laterality Date    breast biopsy      RT    COLONOSCOPY      COLONOSCOPY N/A 2021    Procedure: Colonoscopy, with polypectomy;  Surgeon: Gerber Panda MD;  Location: [facility] OR    DILATION AND CURETTAGE      D&C    ESOPHAGOSCOPY, GASTROSCOPY, DUODENOSCOPY (EGD), COMBINED      excision      ovarian tumor, LT    HYSTERECTOMY TOTAL ABDOMINAL N/A     Cervix removed per patient.     RELEASE TRIGGER FINGER      RT    SALPINGO OOPHORECTOMY,R/L/DANIEL Left        Social History     Tobacco Use    Smoking status: Former     Packs/day: 0.50     Years: 30.00     Additional pack years: 0.00     Total pack years: 15.00     Types: Cigarettes     Start date: 1975     Quit date: 2019     Years since quittin.3     Passive exposure: Past    Smokeless tobacco: Never    Tobacco comments:     quit on and off through the years   Substance Use Topics    Alcohol use: Yes     Comment: occasionally     Family History   Problem Relation Age of Onset    Cerebrovascular Disease Father     Heart Disease Father 50        Heart failure, cause of death/Myocardial infarction    Cancer - colorectal Maternal Grandmother     Cancer Mother         Lung    Hypertension Brother          Current Outpatient Medications    Medication Sig Dispense Refill    calcium carbonate-vitamin D (OS-KENNEDI) 600-400 MG-UNIT chewable tablet Take 1 chew tab by mouth 2 times daily 100 tablet 3    clonazePAM (KLONOPIN) 0.5 MG tablet TAKE 1/2 TO 1 (ONE-HALF TO ONE) TABLET BY MOUTH TWICE DAILY AS NEEDED 30 tablet 0    FLUoxetine (PROZAC) 20 MG capsule Take 1 capsule (20 mg) by mouth daily 30 capsule 0    timolol maleate (TIMOPTIC) 0.5 % ophthalmic solution INSTILL 1 DROP INTO EACH EYE IN THE MORNING       Allergies   Allergen Reactions    Morphine Other (See Comments)     Hypotension     Paxil [Paroxetine Mesylate] Other (See Comments)     Intolerance  Paroxetine HCL        Trace Minerals Cr-Cu-Mn-Zn      Mammogram Screening: Mammogram Screening - Mammography discussed and declined    FHS-7:       11/5/2021    10:31 AM 11/18/2022     5:58 PM 11/15/2023     9:19 AM   Breast CA Risk Assessment (FHS-7)   Did any of your first-degree relatives have breast or ovarian cancer? No No No   Did any of your relatives have bilateral breast cancer? No No No   Did any man in your family have breast cancer? No No No   Did any woman in your family have breast and ovarian cancer? No No No   Did any woman in your family have breast cancer before age 50 y? No No No   Do you have 2 or more relatives with breast and/or ovarian cancer? No No No   Do you have 2 or more relatives with breast and/or bowel cancer? No No No     click delete button to remove this line now  Mammogram Screening: Recommended mammography every 1-2 years with patient discussion and risk factor consideration  Pertinent mammograms are reviewed under the imaging tab.    Review of Systems  CONSTITUTIONAL: NEGATIVE for fever, chills, change in weight  INTEGUMENTARY/SKIN: NEGATIVE for worrisome rashes, moles or lesions  EYES: Hx glaucoma  ENT/MOUTH: NEGATIVE for ear, mouth and throat problems  RESP:NEGATIVE for significant cough or SOB  CV: palpitations  GI: NEGATIVE for nausea, abdominal pain, heartburn, or  change in bowel habits  : denies dysuria   MUSCULOSKELETAL: NEGATIVE for significant arthralgias or myalgia  NEURO: NEGATIVE for weakness, dizziness or paresthesias  ENDOCRINE: NEGATIVE for temperature intolerance, skin/hair changes  PSYCHIATRIC: HX anxiety and HX depression    OBJECTIVE:   Pulse 78   Temp 98.3  F (36.8  C) (Tympanic)   Ht 1.524 m (5')   Wt 60.8 kg (134 lb)   SpO2 96%   BMI 26.17 kg/m   Estimated body mass index is 26.17 kg/m  as calculated from the following:    Height as of this encounter: 1.524 m (5').    Weight as of this encounter: 60.8 kg (134 lb).  Physical Exam  GENERAL: healthy, alert and no distress  EYES: Eyes grossly normal to inspection, PERRL and conjunctivae and sclerae normal  HENT: ear canals and TM's normal, nose and mouth without ulcers or lesions  NECK: no adenopathy, no asymmetry, masses, or scars and thyroid normal to palpation  RESP: lungs clear to auscultation - no rales, rhonchi or wheezes  CV: regular rate and rhythm, normal S1 S2, no S3 or S4, no murmur, click or rub, no peripheral edema and peripheral pulses strong  ABDOMEN: soft, nontender, no hepatosplenomegaly, no masses and bowel sounds normal  MS: no gross musculoskeletal defects noted, no edema  SKIN: no suspicious lesions or rashes  NEURO: Normal strength and tone, sensory exam grossly normal, mentation intact, speech normal, and cranial nerves 2-12 intact  PSYCH: mentation appears normal, affect normal/bright  LYMPH: normal ant/post cervical, supraclavicular nodes    Diagnostic Test Results:  Labs reviewed in Epic  Results for orders placed or performed in visit on 11/22/23 (from the past 24 hour(s))   Lipid Profile (Chol, Trig, HDL, LDL calc)   Result Value Ref Range    Cholesterol 226 (H) <200 mg/dL    Triglycerides 89 <150 mg/dL    Direct Measure HDL 71 >=50 mg/dL    LDL Cholesterol Calculated 137 (H) <=100 mg/dL    Non HDL Cholesterol 155 (H) <130 mg/dL    Narrative    Cholesterol  Desirable:  <200  mg/dL    Triglycerides  Normal:  Less than 150 mg/dL  Borderline High:  150-199 mg/dL  High:  200-499 mg/dL  Very High:  Greater than or equal to 500 mg/dL    Direct Measure HDL  Female:  Greater than or equal to 50 mg/dL   Male:  Greater than or equal to 40 mg/dL    LDL Cholesterol  Desirable:  <100mg/dL  Above Desirable:  100-129 mg/dL   Borderline High:  130-159 mg/dL   High:  160-189 mg/dL   Very High:  >= 190 mg/dL    Non HDL Cholesterol  Desirable:  130 mg/dL  Above Desirable:  130-159 mg/dL  Borderline High:  160-189 mg/dL  High:  190-219 mg/dL  Very High:  Greater than or equal to 220 mg/dL   Comprehensive metabolic panel (BMP + Alb, Alk Phos, ALT, AST, Total. Bili, TP)   Result Value Ref Range    Sodium 140 135 - 145 mmol/L    Potassium 4.4 3.4 - 5.3 mmol/L    Carbon Dioxide (CO2) 30 (H) 22 - 29 mmol/L    Anion Gap 8 7 - 15 mmol/L    Urea Nitrogen 13.6 8.0 - 23.0 mg/dL    Creatinine 0.74 0.51 - 0.95 mg/dL    GFR Estimate 88 >60 mL/min/1.73m2    Calcium 9.6 8.8 - 10.2 mg/dL    Chloride 102 98 - 107 mmol/L    Glucose 87 70 - 99 mg/dL    Alkaline Phosphatase 58 40 - 150 U/L    AST 20 0 - 45 U/L    ALT 10 0 - 50 U/L    Protein Total 7.1 6.4 - 8.3 g/dL    Albumin 4.3 3.5 - 5.2 g/dL    Bilirubin Total 0.3 <=1.2 mg/dL       ASSESSMENT / PLAN:   (Z00.00) Encounter for Medicare annual wellness exam  (primary encounter diagnosis)  Comment: continue yearly medicare wellness visits    (F33.1) Major depressive disorder, recurrent episode, moderate (H)  Comment: stable with current treatment   Reviewed labs no concerns   Plan: Comprehensive metabolic panel (BMP + Alb, Alk         Phos, ALT, AST, Total. Bili, TP), FLUoxetine         (PROZAC) 20 MG capsule            (F41.1) MOOKIE (generalized anxiety disorder)  Comment: stable with current treatment - she rarely uses her klonopin for her anxiety works well when needed  Plan: Comprehensive metabolic panel (BMP + Alb, Alk         Phos, ALT, AST, Total. Bili, TP), clonazePAM          (KLONOPIN) 0.5 MG tablet            (Z13.220) Lipid screening  Comment: continue with healthy lifestyle changes. Eating a healthy diet, stay active and maintain a healthy weight   Plan: Lipid Profile (Chol, Trig, HDL, LDL calc)            (M81.0) Age-related osteoporosis without current pathological fracture  Comment: did not tolerate reclast  Continue with calcium and vitamin D   Plan: Vitamin D Deficiency            (Z12.31) Visit for screening mammogram  Comment: due for screening   Plan: MA SCREENING DIGITAL BILATERAL ([place])            Patient has been advised of split billing requirements and indicates understanding: Yes      COUNSELING:  Reviewed preventive health counseling, as reflected in patient instructions  Special attention given to:       Regular exercise       Healthy diet/nutrition       Bladder control      BMI:   Estimated body mass index is 26.17 kg/m  as calculated from the following:    Height as of this encounter: 1.524 m (5').    Weight as of this encounter: 60.8 kg (134 lb).   Weight management plan: Discussed healthy diet and exercise guidelines      She reports that she quit smoking about 4 years ago. Her smoking use included cigarettes. She started smoking about 48 years ago. She has a 15.00 pack-year smoking history. She has been exposed to tobacco smoke. She has never used smokeless tobacco.      Appropriate preventive services were discussed with this patient, including applicable screening as appropriate for fall prevention, nutrition, physical activity, Tobacco-use cessation, weight loss and cognition.  Checklist reviewing preventive services available has been given to the patient.    Reviewed patients plan of care and provided an AVS. The Basic Care Plan (routine screening as documented in Health Maintenance) for Mechelle meets the Care Plan requirement. This Care Plan has been established and reviewed with the Patient.          HUBER Hatfield CNP  [place]  [facility] [place]    Identified Health Risks:

## 2023-11-22 ENCOUNTER — OFFICE VISIT (OUTPATIENT)
Dept: FAMILY MEDICINE | Facility: OTHER | Age: 68
End: 2023-11-22
Attending: NURSE PRACTITIONER
Payer: COMMERCIAL

## 2023-11-22 VITALS
OXYGEN SATURATION: 96 % | BODY MASS INDEX: 26.31 KG/M2 | WEIGHT: 134 LBS | TEMPERATURE: 98.3 F | HEART RATE: 78 BPM | HEIGHT: 60 IN

## 2023-11-22 DIAGNOSIS — Z13.220 LIPID SCREENING: ICD-10-CM

## 2023-11-22 DIAGNOSIS — Z00.00 ENCOUNTER FOR MEDICARE ANNUAL WELLNESS EXAM: Primary | ICD-10-CM

## 2023-11-22 DIAGNOSIS — Z12.31 VISIT FOR SCREENING MAMMOGRAM: ICD-10-CM

## 2023-11-22 DIAGNOSIS — F33.1 MAJOR DEPRESSIVE DISORDER, RECURRENT EPISODE, MODERATE (H): ICD-10-CM

## 2023-11-22 DIAGNOSIS — F41.1 GAD (GENERALIZED ANXIETY DISORDER): ICD-10-CM

## 2023-11-22 DIAGNOSIS — M81.0 AGE-RELATED OSTEOPOROSIS WITHOUT CURRENT PATHOLOGICAL FRACTURE: ICD-10-CM

## 2023-11-22 LAB
ALBUMIN SERPL BCG-MCNC: 4.3 G/DL (ref 3.5–5.2)
ALP SERPL-CCNC: 58 U/L (ref 40–150)
ALT SERPL W P-5'-P-CCNC: 10 U/L (ref 0–50)
ANION GAP SERPL CALCULATED.3IONS-SCNC: 8 MMOL/L (ref 7–15)
AST SERPL W P-5'-P-CCNC: 20 U/L (ref 0–45)
BILIRUB SERPL-MCNC: 0.3 MG/DL
BUN SERPL-MCNC: 13.6 MG/DL (ref 8–23)
CALCIUM SERPL-MCNC: 9.6 MG/DL (ref 8.8–10.2)
CHLORIDE SERPL-SCNC: 102 MMOL/L (ref 98–107)
CHOLEST SERPL-MCNC: 226 MG/DL
CREAT SERPL-MCNC: 0.74 MG/DL (ref 0.51–0.95)
DEPRECATED HCO3 PLAS-SCNC: 30 MMOL/L (ref 22–29)
EGFRCR SERPLBLD CKD-EPI 2021: 88 ML/MIN/1.73M2
GLUCOSE SERPL-MCNC: 87 MG/DL (ref 70–99)
HDLC SERPL-MCNC: 71 MG/DL
LDLC SERPL CALC-MCNC: 137 MG/DL
NONHDLC SERPL-MCNC: 155 MG/DL
POTASSIUM SERPL-SCNC: 4.4 MMOL/L (ref 3.4–5.3)
PROT SERPL-MCNC: 7.1 G/DL (ref 6.4–8.3)
SODIUM SERPL-SCNC: 140 MMOL/L (ref 135–145)
TRIGL SERPL-MCNC: 89 MG/DL

## 2023-11-22 PROCEDURE — 80061 LIPID PANEL: CPT | Mod: ZL | Performed by: NURSE PRACTITIONER

## 2023-11-22 PROCEDURE — 82306 VITAMIN D 25 HYDROXY: CPT | Mod: ZL | Performed by: NURSE PRACTITIONER

## 2023-11-22 PROCEDURE — 80053 COMPREHEN METABOLIC PANEL: CPT | Mod: ZL | Performed by: NURSE PRACTITIONER

## 2023-11-22 PROCEDURE — 36415 COLL VENOUS BLD VENIPUNCTURE: CPT | Mod: ZL | Performed by: NURSE PRACTITIONER

## 2023-11-22 PROCEDURE — G0463 HOSPITAL OUTPT CLINIC VISIT: HCPCS

## 2023-11-22 PROCEDURE — G0439 PPPS, SUBSEQ VISIT: HCPCS | Performed by: NURSE PRACTITIONER

## 2023-11-22 RX ORDER — CLONAZEPAM 0.5 MG/1
TABLET ORAL
Qty: 30 TABLET | Refills: 0 | Status: SHIPPED | OUTPATIENT
Start: 2023-11-22 | End: 2023-12-09

## 2023-11-22 RX ORDER — TIMOLOL MALEATE 5 MG/ML
SOLUTION/ DROPS OPHTHALMIC
COMMUNITY
Start: 2023-11-20

## 2023-11-22 ASSESSMENT — ANXIETY QUESTIONNAIRES
7. FEELING AFRAID AS IF SOMETHING AWFUL MIGHT HAPPEN: SEVERAL DAYS
6. BECOMING EASILY ANNOYED OR IRRITABLE: SEVERAL DAYS
1. FEELING NERVOUS, ANXIOUS, OR ON EDGE: SEVERAL DAYS
IF YOU CHECKED OFF ANY PROBLEMS ON THIS QUESTIONNAIRE, HOW DIFFICULT HAVE THESE PROBLEMS MADE IT FOR YOU TO DO YOUR WORK, TAKE CARE OF THINGS AT HOME, OR GET ALONG WITH OTHER PEOPLE: SOMEWHAT DIFFICULT
5. BEING SO RESTLESS THAT IT IS HARD TO SIT STILL: SEVERAL DAYS
GAD7 TOTAL SCORE: 7
2. NOT BEING ABLE TO STOP OR CONTROL WORRYING: SEVERAL DAYS
GAD7 TOTAL SCORE: 7
4. TROUBLE RELAXING: SEVERAL DAYS
3. WORRYING TOO MUCH ABOUT DIFFERENT THINGS: SEVERAL DAYS

## 2023-11-22 ASSESSMENT — PATIENT HEALTH QUESTIONNAIRE - PHQ9
10. IF YOU CHECKED OFF ANY PROBLEMS, HOW DIFFICULT HAVE THESE PROBLEMS MADE IT FOR YOU TO DO YOUR WORK, TAKE CARE OF THINGS AT HOME, OR GET ALONG WITH OTHER PEOPLE: NOT DIFFICULT AT ALL
SUM OF ALL RESPONSES TO PHQ QUESTIONS 1-9: 4
SUM OF ALL RESPONSES TO PHQ QUESTIONS 1-9: 4

## 2023-11-22 ASSESSMENT — PAIN SCALES - GENERAL: PAINLEVEL: NO PAIN (0)

## 2023-11-22 NOTE — PATIENT INSTRUCTIONS
Patient Education   Personalized Prevention Plan  You are due for the preventive services outlined below.  Your care team is available to assist you in scheduling these services.  If you have already completed any of these items, please share that information with your care team to update in your medical record.  Health Maintenance Due   Topic Date Due     Hepatitis C Screening  Never done     RSV VACCINE (Pregnancy & 60+) (1 - 1-dose 60+ series) Never done     LUNG CANCER SCREENING  12/28/2022     Anxiety Assessment  05/21/2023     Depression Assessment  05/21/2023     Osteoporosis Screening  05/24/2023     Flu Vaccine (1) 09/01/2023     COVID-19 Vaccine (5 - 2023-24 season) 09/01/2023     Cholesterol Lab  11/21/2023     FALL RISK ASSESSMENT  11/21/2023     Annual Wellness Visit  11/21/2023     Preventive Health Recommendations    See your health care provider every year to  Review health changes.   Discuss preventive care.    Review your medicines if your doctor has prescribed any.  You no longer need a yearly Pap test unless you've had an abnormal Pap test in the past 10 years. If you have vaginal symptoms, such as bleeding or discharge, be sure to talk with your provider about a Pap test.  Every 1 to 2 years, have a mammogram.  If you are over 69, talk with your health care provider about whether or not you want to continue having screening mammograms.  Every 10 years, have a colonoscopy. Or, have a yearly FIT test (stool test). These exams will check for colon cancer.   Have a cholesterol test every 5 years, or more often if your doctor advises it.   Have a diabetes test (fasting glucose) every three years. If you are at risk for diabetes, you should have this test more often.   At age 65, have a bone density scan (DEXA) to check for osteoporosis (brittle bone disease).    Shots:  Get a flu shot each year.  Get a tetanus shot every 10 years.  Talk to your doctor about your pneumonia vaccines. There are now two  you should receive - Pneumovax (PPSV 23) and Prevnar (PCV 13).  Talk to your pharmacist about the shingles vaccine.  Talk to your doctor about the hepatitis B vaccine.    Nutrition:   Eat at least 5 servings of fruits and vegetables each day.  Eat whole-grain bread, whole-wheat pasta and brown rice instead of white grains and rice.  Get adequate Calcium and Vitamin D.     Lifestyle  Exercise at least 150 minutes a week (30 minutes a day, 5 days a week). This will help you control your weight and prevent disease.  Limit alcohol to one drink per day.  No smoking.   Wear sunscreen to prevent skin cancer.   See your dentist twice a year for an exam and cleaning.  See your eye doctor every 1 to 2 years to screen for conditions such as glaucoma, macular degeneration and cataracts.    Personalized Prevention Plan  You are due for the preventive services outlined below.  Your care team is available to assist you in scheduling these services.  If you have already completed any of these items, please share that information with your care team to update in your medical record.  Health Maintenance   Topic Date Due     HEPATITIS C SCREENING  Never done     RSV VACCINE (Pregnancy & 60+) (1 - 1-dose 60+ series) Never done     LUNG CANCER SCREENING  12/28/2022     MOOKIE ASSESSMENT  05/21/2023     PHQ-9  05/21/2023     DEXA  05/24/2023     INFLUENZA VACCINE (1) 09/01/2023     COVID-19 Vaccine (5 - 2023-24 season) 09/01/2023     LIPID  11/21/2023     FALL RISK ASSESSMENT  11/21/2023     MEDICARE ANNUAL WELLNESS VISIT  11/21/2023     MAMMO SCREENING  12/28/2023     COLORECTAL CANCER SCREENING  09/09/2026     ADVANCE CARE PLANNING  11/21/2027     DTAP/TDAP/TD IMMUNIZATION (2 - Td or Tdap) 05/19/2031     DEPRESSION ACTION PLAN  Completed     Pneumococcal Vaccine: 65+ Years  Completed     ZOSTER IMMUNIZATION  Completed     IPV IMMUNIZATION  Aged Out     HPV IMMUNIZATION  Aged Out     MENINGITIS IMMUNIZATION  Aged Out     RSV MONOCLONAL  ANTIBODY  Aged Out

## 2023-11-23 LAB — VIT D+METAB SERPL-MCNC: 52 NG/ML (ref 20–50)

## 2023-12-09 ENCOUNTER — MYC REFILL (OUTPATIENT)
Dept: FAMILY MEDICINE | Facility: OTHER | Age: 68
End: 2023-12-09

## 2023-12-09 DIAGNOSIS — F41.1 GAD (GENERALIZED ANXIETY DISORDER): ICD-10-CM

## 2023-12-11 NOTE — TELEPHONE ENCOUNTER
KLONOPIN    11-22-23  Last Written Prescription Date:  11-2-2-23  Last Fill Quantity: 30,   # refills: 0  Last Office Visit: 11-22-23  Future Office visit:       Routing refill request to provider for review/approval because:  Drug not on the [facility], [facility] or [facility] refill protocol or controlled substance

## 2023-12-12 RX ORDER — CLONAZEPAM 0.5 MG/1
TABLET ORAL
Qty: 30 TABLET | Refills: 0 | Status: SHIPPED | OUTPATIENT
Start: 2023-12-12

## 2024-01-24 ENCOUNTER — HOSPITAL ENCOUNTER (EMERGENCY)
Facility: HOSPITAL | Age: 69
Discharge: HOME | End: 2024-01-24
Attending: NURSE PRACTITIONER | Admitting: NURSE PRACTITIONER
Payer: MEDICARE

## 2024-01-24 ENCOUNTER — APPOINTMENT (OUTPATIENT)
Dept: GENERAL RADIOLOGY | Facility: HOSPITAL | Age: 69
End: 2024-01-24
Attending: NURSE PRACTITIONER
Payer: MEDICARE

## 2024-01-24 VITALS
WEIGHT: 134.04 LBS | DIASTOLIC BLOOD PRESSURE: 61 MMHG | BODY MASS INDEX: 26.32 KG/M2 | SYSTOLIC BLOOD PRESSURE: 133 MMHG | OXYGEN SATURATION: 98 % | HEIGHT: 60 IN | TEMPERATURE: 98.2 F | HEART RATE: 61 BPM | RESPIRATION RATE: 16 BRPM

## 2024-01-24 DIAGNOSIS — M25.532 LEFT WRIST PAIN: ICD-10-CM

## 2024-01-24 PROCEDURE — G0463 HOSPITAL OUTPT CLINIC VISIT: HCPCS

## 2024-01-24 PROCEDURE — 99213 OFFICE O/P EST LOW 20 MIN: CPT | Performed by: NURSE PRACTITIONER

## 2024-01-24 PROCEDURE — 73110 X-RAY EXAM OF WRIST: CPT | Mod: LT

## 2024-01-24 ASSESSMENT — ENCOUNTER SYMPTOMS
DIARRHEA: 0
VOMITING: 0
FEVER: 0
NAUSEA: 0
CHILLS: 0

## 2024-01-24 ASSESSMENT — ACTIVITIES OF DAILY LIVING (ADL): ADLS_ACUITY_SCORE: 35

## 2024-01-24 NOTE — ED PROVIDER NOTES
History     Chief Complaint   Patient presents with    Wrist Pain     Left wrist pain     HPI  Mechelle Fong is a 68 year old female who presents urgent care today ambulatory with complaints of left wrist pain.  Patient states she was sitting on a bench prior to arrival when the bench tipped and she fell to the ground resulting in wrist pain.  Denies any previous fracture, dislocation or surgery to left wrist.  Denies hitting head, LOC or any other injuries.  No open skin wounds.  Full ROM.  Pain gradually improving.  No OTC meds.  No other concerns.    Allergies:  Allergies   Allergen Reactions    Morphine Other (See Comments)     Hypotension     Paxil [Paroxetine Mesylate] Other (See Comments)     Intolerance  Paroxetine HCL        Trace Minerals Cr-[id number]        Problem List:    Patient Active Problem List    Diagnosis Date Noted    Age-related osteoporosis without current pathological fracture 07/12/2021     Priority: Medium    MOOKIE (generalized anxiety disorder) 05/14/2016     Priority: Medium    ACP (advance care planning) 05/11/2016     Priority: Medium     Advance Care Planning 5/11/2016: ACP Review of Chart / Resources Provided:  Reviewed chart for advance care plan.  Mechelle Fong has no plan or code status on file. She declined paperwork at this time.  Added by Roshan Hunter            Major depressive disorder, recurrent episode, moderate (H) 05/13/2013     Priority: Medium        Past Medical History:    Past Medical History:   Diagnosis Date    Depression 10/09/2001    Dysthymic disorder 10/22/2001       Past Surgical History:    Past Surgical History:   Procedure Laterality Date    breast biopsy      RT    COLONOSCOPY      COLONOSCOPY N/A 9/9/2021    Procedure: Colonoscopy, with polypectomy;  Surgeon: Gerber Panda MD;  Location: [facility]    DILATION AND CURETTAGE      D&C    ESOPHAGOSCOPY, GASTROSCOPY, DUODENOSCOPY (EGD), COMBINED      excision      ovarian tumor, LT    HYSTERECTOMY TOTAL  ABDOMINAL N/A     Cervix removed per patient.     RELEASE TRIGGER FINGER      RT    SALPINGO OOPHORECTOMY,R/L/DANIEL Left        Family History:    Family History   Problem Relation Age of Onset    Cerebrovascular Disease Father     Heart Disease Father 50        Heart failure, cause of death/Myocardial infarction    Cancer - colorectal Maternal Grandmother     Cancer Mother         Lung    Hypertension Brother        Social History:  Marital Status:   [2]  Social History     Tobacco Use    Smoking status: Former     Packs/day: 0.50     Years: 30.00     Additional pack years: 0.00     Total pack years: 15.00     Types: Cigarettes     Start date: 1975     Quit date: 2019     Years since quittin.4     Passive exposure: Past    Smokeless tobacco: Never    Tobacco comments:     quit on and off through the years   Vaping Use    Vaping Use: Never used   Substance Use Topics    Alcohol use: Yes     Comment: occasionally    Drug use: No        Medications:    calcium carbonate-vitamin D (OS-KENNEDI) 600-400 MG-UNIT chewable tablet  clonazePAM (KLONOPIN) 0.5 MG tablet  FLUoxetine (PROZAC) 20 MG capsule  timolol maleate (TIMOPTIC) 0.5 % ophthalmic solution  FLUoxetine (PROZAC) 20 MG capsule      Review of Systems   Constitutional:  Negative for chills and fever.   Cardiovascular:  Negative for chest pain.   Gastrointestinal:  Negative for diarrhea, nausea and vomiting.   Musculoskeletal:         Left wrist pain   Skin: Negative.      Physical Exam   BP: 133/61  Pulse: 61  Temp: 98.2  F (36.8  C)  Resp: 16  Height: 152.4 cm (5')  Weight: 60.8 kg (134 lb 0.6 oz)  SpO2: 98 %    Physical Exam  Vitals and nursing note reviewed.   Constitutional:       General: She is not in acute distress.     Appearance: Normal appearance. She is not ill-appearing or toxic-appearing.   Cardiovascular:      Rate and Rhythm: Normal rate and regular rhythm.      Pulses: Normal pulses.      Heart sounds: Normal heart sounds.   Pulmonary:       Effort: Pulmonary effort is normal.      Breath sounds: Normal breath sounds.   Musculoskeletal:      Left wrist: Snuff box tenderness present. No swelling, deformity, effusion, lacerations or crepitus. Normal range of motion. Normal pulse.      Left hand: Normal.   Skin:     General: Skin is warm and dry.      Capillary Refill: Capillary refill takes less than 2 seconds.   Neurological:      Mental Status: She is alert.   Psychiatric:         Mood and Affect: Mood normal.       ED Course     Procedures    Results for orders placed or performed during the hospital encounter of 01/24/24 (from the past 24 hour(s))   XR Wrist Left G/E 3 Views    Narrative    XR WRIST LEFT G/E 3 VIEWS    HISTORY: 68 years Female wrist pain after falling off a bench    COMPARISON: None    TECHNIQUE: Left wrist 4 views    FINDINGS: Joint spaces are congruent. There is degenerative arthritic  change of the first carpometacarpal articulation. The scaphoid is  intact. There is no evidence of fracture or dislocation.      Impression    IMPRESSION: No evidence of fracture or dislocation.    FAUSTINO PRITCHETT MD         SYSTEM ID:  [id number]       Medications - No data to display    Assessments & Plan (with Medical Decision Making)     I have reviewed the nursing notes.    I have reviewed the findings, diagnosis, plan and need for follow up with the patient.  (M25.532) Left wrist pain  Plan:   Patient ambulatory with a nontoxic appearance.  Patient arrived with complaints of left wrist pain after she was sitting on a bench and the bench tipped sideways resulting in wrist pain when she hit the ground.  Denies hitting head, LOC or any other injuries.  Snuffbox tenderness.  Full ROM.  No open skin wounds or signs of infection.  No previous fracture, dislocation or surgery.  X-ray shows no evidence of fracture or dislocation.  Patient to follow RICE.  Ace wrap as needed.  Alternate Tylenol and ibuprofen as needed for pain.  Follow-up with  primary care provider or return to urgent care/ED with any worsening in condition or additional concerns.  Patient in agreement treatment plan.    Discharge Medication List as of 1/24/2024  5:40 PM        Final diagnoses:   Left wrist pain     1/24/2024   [facility]       Cary Beltran, NP  01/24/24 1839

## 2024-01-24 NOTE — ED TRIAGE NOTES
Pt presents with c/o left wrist pain. Reports a bench she was sitting on tipped and hurt left wrist catching herself. Incident happened a couple hours ago. ROM WNL. CMS intact. Reports hurts to bend and lift things. Reports hx of osteoporosis. No otc meds.

## 2024-01-24 NOTE — DISCHARGE INSTRUCTIONS
Rest  Ice  Compression with ace wrap or wrist brace as needed  Elevate  Alternate tylenol and ibuprofen as needed for pain  Follow-up with primary care provider or return to urgent care/ED with any worsening in condition or additional concerns.

## 2024-02-04 ENCOUNTER — HEALTH MAINTENANCE LETTER (OUTPATIENT)
Age: 69
End: 2024-02-04

## 2024-02-15 ENCOUNTER — TELEPHONE (OUTPATIENT)
Dept: MAMMOGRAPHY | Facility: OTHER | Age: 69
End: 2024-02-15

## 2024-02-15 ENCOUNTER — ANCILLARY PROCEDURE (OUTPATIENT)
Dept: MAMMOGRAPHY | Facility: OTHER | Age: 69
End: 2024-02-15
Attending: NURSE PRACTITIONER
Payer: MEDICARE

## 2024-02-15 DIAGNOSIS — Z12.31 VISIT FOR SCREENING MAMMOGRAM: ICD-10-CM

## 2024-02-15 PROCEDURE — 77063 BREAST TOMOSYNTHESIS BI: CPT | Mod: TC

## 2024-11-18 DIAGNOSIS — F33.1 MAJOR DEPRESSIVE DISORDER, RECURRENT EPISODE, MODERATE (H): ICD-10-CM

## 2024-11-18 NOTE — TELEPHONE ENCOUNTER
Fluoxetine      Last Written Prescription Date:  11/22/23  Last Fill Quantity: 90,   # refills: 3  Last Office Visit: 11/22/23  Future Office visit:    Next 5 appointments (look out 90 days)      Nov 25, 2024 8:00 AM  (Arrive by 7:45 AM)  Adult Preventative Visit with HUBER Angel CNP  [facility] ([facility] [place] ) [address]  [place] [address]  [phone]             Routing refill request to provider for review/approval because:

## 2024-11-18 NOTE — TELEPHONE ENCOUNTER
SSRIs Protocol Failed      PHQ-9 score less than 5 in past 6 months    Please review last PHQ-9 score.

## 2024-11-20 SDOH — HEALTH STABILITY: PHYSICAL HEALTH: ON AVERAGE, HOW MANY DAYS PER WEEK DO YOU ENGAGE IN MODERATE TO STRENUOUS EXERCISE (LIKE A BRISK WALK)?: 3 DAYS

## 2024-11-20 SDOH — HEALTH STABILITY: PHYSICAL HEALTH: ON AVERAGE, HOW MANY MINUTES DO YOU ENGAGE IN EXERCISE AT THIS LEVEL?: 30 MIN

## 2024-11-20 ASSESSMENT — SOCIAL DETERMINANTS OF HEALTH (SDOH): HOW OFTEN DO YOU GET TOGETHER WITH FRIENDS OR RELATIVES?: ONCE A WEEK

## 2024-11-25 ENCOUNTER — LAB (OUTPATIENT)
Dept: LAB | Facility: OTHER | Age: 69
End: 2024-11-25
Payer: COMMERCIAL

## 2024-11-25 ENCOUNTER — OFFICE VISIT (OUTPATIENT)
Dept: FAMILY MEDICINE | Facility: OTHER | Age: 69
End: 2024-11-25
Attending: NURSE PRACTITIONER
Payer: MEDICARE

## 2024-11-25 VITALS
RESPIRATION RATE: 17 BRPM | BODY MASS INDEX: 25.58 KG/M2 | HEIGHT: 60 IN | TEMPERATURE: 99 F | DIASTOLIC BLOOD PRESSURE: 80 MMHG | WEIGHT: 130.3 LBS | OXYGEN SATURATION: 98 % | HEART RATE: 71 BPM | SYSTOLIC BLOOD PRESSURE: 130 MMHG

## 2024-11-25 DIAGNOSIS — M81.0 AGE-RELATED OSTEOPOROSIS WITHOUT CURRENT PATHOLOGICAL FRACTURE: ICD-10-CM

## 2024-11-25 DIAGNOSIS — Z00.00 ENCOUNTER FOR MEDICARE ANNUAL WELLNESS EXAM: Primary | ICD-10-CM

## 2024-11-25 DIAGNOSIS — F41.1 GAD (GENERALIZED ANXIETY DISORDER): ICD-10-CM

## 2024-11-25 DIAGNOSIS — Z87.891 PERSONAL HISTORY OF TOBACCO USE: ICD-10-CM

## 2024-11-25 DIAGNOSIS — Z13.220 LIPID SCREENING: ICD-10-CM

## 2024-11-25 DIAGNOSIS — N64.4 BREAST PAIN, RIGHT: ICD-10-CM

## 2024-11-25 DIAGNOSIS — R00.2 PALPITATIONS: ICD-10-CM

## 2024-11-25 DIAGNOSIS — F33.1 MAJOR DEPRESSIVE DISORDER, RECURRENT EPISODE, MODERATE (H): ICD-10-CM

## 2024-11-25 LAB
ALBUMIN SERPL BCG-MCNC: 4.4 G/DL (ref 3.5–5.2)
ALP SERPL-CCNC: 79 U/L (ref 40–150)
ALT SERPL W P-5'-P-CCNC: 13 U/L (ref 0–50)
ANION GAP SERPL CALCULATED.3IONS-SCNC: 10 MMOL/L (ref 7–15)
AST SERPL W P-5'-P-CCNC: 20 U/L (ref 0–45)
BILIRUB SERPL-MCNC: 0.4 MG/DL
BUN SERPL-MCNC: 13.9 MG/DL (ref 8–23)
CALCIUM SERPL-MCNC: 9.5 MG/DL (ref 8.8–10.4)
CHLORIDE SERPL-SCNC: 101 MMOL/L (ref 98–107)
CHOLEST SERPL-MCNC: 248 MG/DL
CREAT SERPL-MCNC: 0.75 MG/DL (ref 0.51–0.95)
EGFRCR SERPLBLD CKD-EPI 2021: 86 ML/MIN/1.73M2
FASTING STATUS PATIENT QL REPORTED: YES
FASTING STATUS PATIENT QL REPORTED: YES
GLUCOSE SERPL-MCNC: 90 MG/DL (ref 70–99)
HCO3 SERPL-SCNC: 27 MMOL/L (ref 22–29)
HDLC SERPL-MCNC: 70 MG/DL
LDLC SERPL CALC-MCNC: 158 MG/DL
NONHDLC SERPL-MCNC: 178 MG/DL
POTASSIUM SERPL-SCNC: 4.5 MMOL/L (ref 3.4–5.3)
PROT SERPL-MCNC: 6.9 G/DL (ref 6.4–8.3)
SODIUM SERPL-SCNC: 138 MMOL/L (ref 135–145)
TRIGL SERPL-MCNC: 102 MG/DL
VIT D+METAB SERPL-MCNC: 50 NG/ML (ref 20–50)

## 2024-11-25 PROCEDURE — 82306 VITAMIN D 25 HYDROXY: CPT | Mod: ZL

## 2024-11-25 PROCEDURE — 36415 COLL VENOUS BLD VENIPUNCTURE: CPT | Mod: ZL

## 2024-11-25 PROCEDURE — 82465 ASSAY BLD/SERUM CHOLESTEROL: CPT | Mod: ZL

## 2024-11-25 PROCEDURE — G0296 VISIT TO DETERM LDCT ELIG: HCPCS | Performed by: NURSE PRACTITIONER

## 2024-11-25 PROCEDURE — 80053 COMPREHEN METABOLIC PANEL: CPT | Mod: ZL

## 2024-11-25 ASSESSMENT — ANXIETY QUESTIONNAIRES
8. IF YOU CHECKED OFF ANY PROBLEMS, HOW DIFFICULT HAVE THESE MADE IT FOR YOU TO DO YOUR WORK, TAKE CARE OF THINGS AT HOME, OR GET ALONG WITH OTHER PEOPLE?: NOT DIFFICULT AT ALL
5. BEING SO RESTLESS THAT IT IS HARD TO SIT STILL: NOT AT ALL
1. FEELING NERVOUS, ANXIOUS, OR ON EDGE: SEVERAL DAYS
7. FEELING AFRAID AS IF SOMETHING AWFUL MIGHT HAPPEN: NOT AT ALL
2. NOT BEING ABLE TO STOP OR CONTROL WORRYING: SEVERAL DAYS
GAD7 TOTAL SCORE: 4
GAD7 TOTAL SCORE: 4
6. BECOMING EASILY ANNOYED OR IRRITABLE: SEVERAL DAYS
IF YOU CHECKED OFF ANY PROBLEMS ON THIS QUESTIONNAIRE, HOW DIFFICULT HAVE THESE PROBLEMS MADE IT FOR YOU TO DO YOUR WORK, TAKE CARE OF THINGS AT HOME, OR GET ALONG WITH OTHER PEOPLE: NOT DIFFICULT AT ALL
4. TROUBLE RELAXING: NOT AT ALL
GAD7 TOTAL SCORE: 4
3. WORRYING TOO MUCH ABOUT DIFFERENT THINGS: SEVERAL DAYS
7. FEELING AFRAID AS IF SOMETHING AWFUL MIGHT HAPPEN: NOT AT ALL

## 2024-11-25 ASSESSMENT — PAIN SCALES - GENERAL: PAINLEVEL_OUTOF10: MILD PAIN (2)

## 2024-11-25 ASSESSMENT — ENCOUNTER SYMPTOMS: NERVOUS/ANXIOUS: 1

## 2024-11-25 NOTE — PATIENT INSTRUCTIONS
Patient Education   Preventive Care Advice   This is general advice given by our system to help you stay healthy. However, your care team may have specific advice just for you. Please talk to your care team about your preventive care needs.  Nutrition  Eat 5 or more servings of fruits and vegetables each day.  Try wheat bread, brown rice and whole grain pasta (instead of white bread, rice, and pasta).  Get enough calcium and vitamin D. Check the label on foods and aim for 100% of the RDA (recommended daily allowance).  Lifestyle  Exercise at least 150 minutes each week  (30 minutes a day, 5 days a week).  Do muscle strengthening activities 2 days a week. These help control your weight and prevent disease.  No smoking.  Wear sunscreen to prevent skin cancer.  Have a dental exam and cleaning every 6 months.  Yearly exams  See your health care team every year to talk about:  Any changes in your health.  Any medicines your care team has prescribed.  Preventive care, family planning, and ways to prevent chronic diseases.  Shots (vaccines)   HPV shots (up to age 26), if you've never had them before.  Hepatitis B shots (up to age 59), if you've never had them before.  COVID-19 shot: Get this shot when it's due.  Flu shot: Get a flu shot every year.  Tetanus shot: Get a tetanus shot every 10 years.  Pneumococcal, hepatitis A, and RSV shots: Ask your care team if you need these based on your risk.  Shingles shot (for age 50 and up)  General health tests  Diabetes screening:  Starting at age 35, Get screened for diabetes at least every 3 years.  If you are younger than age 35, ask your care team if you should be screened for diabetes.  Cholesterol test: At age 39, start having a cholesterol test every 5 years, or more often if advised.  Bone density scan (DEXA): At age 50, ask your care team if you should have this scan for osteoporosis (brittle bones).  Hepatitis C: Get tested at least once in your life.  STIs (sexually  transmitted infections)  Before age 24: Ask your care team if you should be screened for STIs.  After age 24: Get screened for STIs if you're at risk. You are at risk for STIs (including HIV) if:  You are sexually active with more than one person.  You don't use condoms every time.  You or a partner was diagnosed with a sexually transmitted infection.  If you are at risk for HIV, ask about PrEP medicine to prevent HIV.  Get tested for HIV at least once in your life, whether you are at risk for HIV or not.  Cancer screening tests  Cervical cancer screening: If you have a cervix, begin getting regular cervical cancer screening tests starting at age 21.  Breast cancer scan (mammogram): If you've ever had breasts, begin having regular mammograms starting at age 40. This is a scan to check for breast cancer.  Colon cancer screening: It is important to start screening for colon cancer at age 45.  Have a colonoscopy test every 10 years (or more often if you're at risk) Or, ask your provider about stool tests like a FIT test every year or Cologuard test every 3 years.  To learn more about your testing options, visit:   .  For help making a decision, visit:   https://bit.ly/kr33706.  Prostate cancer screening test: If you have a prostate, ask your care team if a prostate cancer screening test (PSA) at age 55 is right for you.  Lung cancer screening: If you are a current or former smoker ages 50 to 80, ask your care team if ongoing lung cancer screenings are right for you.  For informational purposes only. Not to replace the advice of your health care provider. Copyright   2023 [facility]. All rights reserved. Clinically reviewed by the [facility] Transitions Program. [employer] 666202 - REV 01/24.  Hearing Loss: Care Instructions  Overview     Hearing loss is a sudden or slow decrease in how well you hear. It can range from slight to profound. Permanent hearing loss can occur with aging. It also can  happen when you are exposed long-term to loud noise. Examples include listening to loud music, riding motorcycles, or being around other loud machines.  Hearing loss can affect your work and home life. It can make you feel lonely or depressed. You may feel that you have lost your independence. But hearing aids and other devices can help you hear better and feel connected to others.  Follow-up care is a key part of your treatment and safety. Be sure to make and go to all appointments, and call your doctor if you are having problems. It's also a good idea to know your test results and keep a list of the medicines you take.  How can you care for yourself at home?  Avoid loud noises whenever possible. This helps keep your hearing from getting worse.  Always wear hearing protection around loud noises.  Wear a hearing aid as directed.  A professional can help you pick a hearing aid that will work best for you.  You can also get hearing aids over the counter for mild to moderate hearing loss.  Have hearing tests as your doctor suggests. They can show whether your hearing has changed. Your hearing aid may need to be adjusted.  Use other devices as needed. These may include:  Telephone amplifiers and hearing aids that can connect to a television, stereo, radio, or microphone.  Devices that use lights or vibrations. These alert you to the doorbell, a ringing telephone, or a baby monitor.  Television closed-captioning. This shows the words at the bottom of the screen. Most new TVs can do this.  TTY (text telephone). This lets you type messages back and forth on the telephone instead of talking or listening. These devices are also called TDD. When messages are typed on the keyboard, they are sent over the phone line to a receiving TTY. The message is shown on a monitor.  Use text messaging, social media, and email if it is hard for you to communicate by telephone.  Try to learn a listening technique called speechreading. It is  "not lipreading. You pay attention to people's gestures, expressions, posture, and tone of voice. These clues can help you understand what a person is saying. Face the person you are talking to, and have them face you. Make sure the lighting is good. You need to see the other person's face clearly.  Think about counseling if you need help to adjust to your hearing loss.  When should you call for help?  Watch closely for changes in your health, and be sure to contact your doctor if:    You think your hearing is getting worse.     You have new symptoms, such as dizziness or nausea.   Where can you learn more?  Go to https://www.TimZon.net/patiented  Enter R798 in the search box to learn more about \"Hearing Loss: Care Instructions.\"  Current as of: September 27, 2023  Content Version: 14.2    2024 [employer].   Care instructions adapted under license by your healthcare professional. If you have questions about a medical condition or this instruction, always ask your healthcare professional. [employer] disclaims any warranty or liability for your use of this information.    Learning About Stress  What is stress?     Stress is your body's response to a hard situation. Your body can have a physical, emotional, or mental response. Stress is a fact of life for most people, and it affects everyone differently. What causes stress for you may not be stressful for someone else.  A lot of things can cause stress. You may feel stress when you go on a job interview, take a test, or run a race. This kind of short-term stress is normal and even useful. It can help you if you need to work hard or react quickly. For example, stress can help you finish an important job on time.  Long-term stress is caused by ongoing stressful situations or events. Examples of long-term stress include long-term health problems, ongoing problems at work, or conflicts in your family. Long-term stress can harm your health.  How " does stress affect your health?  When you are stressed, your body responds as though you are in danger. It makes hormones that speed up your heart, make you breathe faster, and give you a burst of energy. This is called the fight-or-flight stress response. If the stress is over quickly, your body goes back to normal and no harm is done.  But if stress happens too often or lasts too long, it can have bad effects. Long-term stress can make you more likely to get sick, and it can make symptoms of some diseases worse. If you tense up when you are stressed, you may develop neck, shoulder, or low back pain. Stress is linked to high blood pressure and heart disease.  Stress also harms your emotional health. It can make you mejia, tense, or depressed. Your relationships may suffer, and you may not do well at work or school.  What can you do to manage stress?  You can try these things to help manage stress:   Do something active. Exercise or activity can help reduce stress. Walking is a great way to get started. Even everyday activities such as housecleaning or yard work can help.  Try yoga or carolina chi. These techniques combine exercise and meditation. You may need some training at first to learn them.  Do something you enjoy. For example, listen to music or go to a movie. Practice your hobby or do volunteer work.  Meditate. This can help you relax, because you are not worrying about what happened before or what may happen in the future.  Do guided imagery. Imagine yourself in any setting that helps you feel calm. You can use online videos, books, or a teacher to guide you.  Do breathing exercises. For example:  From a standing position, bend forward from the waist with your knees slightly bent. Let your arms dangle close to the floor.  Breathe in slowly and deeply as you return to a standing position. Roll up slowly and lift your head last.  Hold your breath for just a few seconds in the standing position.  Breathe out  "slowly and bend forward from the waist.  Let your feelings out. Talk, laugh, cry, and express anger when you need to. Talking with supportive friends or family, a counselor, or a vernon leader about your feelings is a healthy way to relieve stress. Avoid discussing your feelings with people who make you feel worse.  Write. It may help to write about things that are bothering you. This helps you find out how much stress you feel and what is causing it. When you know this, you can find better ways to cope.  What can you do to prevent stress?  You might try some of these things to help prevent stress:  Manage your time. This helps you find time to do the things you want and need to do.  Get enough sleep. Your body recovers from the stresses of the day while you are sleeping.  Get support. Your family, friends, and community can make a difference in how you experience stress.  Limit your news feed. Avoid or limit time on social media or news that may make you feel stressed.  Do something active. Exercise or activity can help reduce stress. Walking is a great way to get started.  Where can you learn more?  Go to https://www.EndorphMe.net/patiented  Enter N032 in the search box to learn more about \"Learning About Stress.\"  Current as of: October 24, 2023  Content Version: 14.2 2024 [employer] [employer].   Care instructions adapted under license by your healthcare professional. If you have questions about a medical condition or this instruction, always ask your healthcare professional. [employer] disclaims any warranty or liability for your use of this information.    Bladder Training: Care Instructions  Your Care Instructions     Bladder training is used to treat urge incontinence and stress incontinence. Urge incontinence means that the need to urinate comes on so fast that you can't get to a toilet in time. Stress incontinence means that you leak urine because of pressure on your bladder. For example, " it may happen when you laugh, cough, or lift something heavy.  Bladder training can increase how long you can wait before you have to urinate. It can also help your bladder hold more urine. And it can give you better control over the urge to urinate.  It is important to remember that bladder training takes a few weeks to a few months to make a difference. You may not see results right away, but don't give up.  Follow-up care is a key part of your treatment and safety. Be sure to make and go to all appointments, and call your doctor if you are having problems. It's also a good idea to know your test results and keep a list of the medicines you take.  How can you care for yourself at home?  Work with your doctor to come up with a bladder training program that is right for you. You may use one or more of the following methods.  Delayed urination  In the beginning, try to keep from urinating for 5 minutes after you first feel the need to go.  While you wait, take deep, slow breaths to relax. Kegel exercises can also help you delay the need to go to the bathroom.  After some practice, when you can easily wait 5 minutes to urinate, try to wait 10 minutes before you urinate.  Slowly increase the waiting period until you are able to control when you have to urinate.  Scheduled urination  Empty your bladder when you first wake up in the morning.  Schedule times throughout the day when you will urinate.  Start by going to the bathroom every hour, even if you don't need to go.  Slowly increase the time between trips to the bathroom.  When you have found a schedule that works well for you, keep doing it.  If you wake up during the night and have to urinate, do it. Apply your schedule to waking hours only.  Kegel exercises  These tighten and strengthen pelvic muscles, which can help you control the flow of urine. (If doing these exercises causes pain, stop doing them and talk with your doctor.) To do Kegel exercises:  Squeeze  "your muscles as if you were trying not to pass gas. Or squeeze your muscles as if you were stopping the flow of urine. Your belly, legs, and buttocks shouldn't move.  Hold the squeeze for 3 seconds, then relax for 5 to 10 seconds.  Start with 3 seconds, then add 1 second each week until you are able to squeeze for 10 seconds.  Repeat the exercise 10 times a session. Do 3 to 8 sessions a day.  When should you call for help?  Watch closely for changes in your health, and be sure to contact your doctor if:    Your incontinence is getting worse.     You do not get better as expected.   Where can you learn more?  Go to https://www.Wistia.net/patiented  Enter V684 in the search box to learn more about \"Bladder Training: Care Instructions.\"  Current as of: November 15, 2023  Content Version: 14.2 2024 [employer] [facility] [employer].   Care instructions adapted under license by your healthcare professional. If you have questions about a medical condition or this instruction, always ask your healthcare professional. [employer] disclaims any warranty or liability for your use of this information.       Lung Cancer Screening   Frequently Asked Questions  If you are at high-risk for lung cancer, getting screened with low-dose computed tomography (LDCT) every year can help save your life. This handout offers answers to some of the most common questions about lung cancer screening. If you have other questions, please call [phone] ([phone]).     What is it?  Lung cancer screening uses special X-ray technology to create an image of your lung tissue. The exam is quick and easy and takes less than 10 seconds. We don t give you any medicine or use any needles. You can eat before and after the exam. You don t need to change your clothes as long as the clothing on your chest doesn t contain metal. But, you do need to be able to hold your breath for at least 6 seconds during the exam.    What is the goal of " lung cancer screening?  The goal of lung cancer screening is to save lives. Many times, lung cancer is not found until a person starts having physical symptoms. Lung cancer screening can help detect lung cancer in the earliest stages when it may be easier to treat.    Who should be screened for lung cancer?  We suggest lung cancer screening for anyone who is at high-risk for lung cancer. You are in the high-risk group if you:      are between the ages of 55 and 79, and    have smoked at least 1 pack of cigarettes a day for 20 or more years, and    still smoke or have quit within the past 15 years.    However, if you have a new cough or shortness of breath, you should talk to your doctor before being screened.    Why does it matter if I have symptoms?  Certain symptoms can be a sign that you have a condition in your lungs that should be checked and treated by your doctor. These symptoms include fever, chest pain, a new or changing cough, shortness of breath that you have never felt before, coughing up blood or unexplained weight loss. Having any of these symptoms can greatly affect the results of lung cancer screening.       Should all smokers get an LDCT lung cancer screening exam?  It depends. Lung cancer screening is for a very specific group of men and women who have a history of heavy smoking over a long period of time (see  Who should be screened for lung cancer  above).  I am in the high-risk group, but have been diagnosed with cancer in the past. Is LDCT lung cancer screening right for me?  In some cases, you should not have LDCT lung screening, such as when your doctor is already following your cancer with CT scan studies. Your doctor will help you decide if LDCT lung screening is right for you.  Do I need to have a screening exam every year?  Yes. If you are in the high-risk group described earlier, you should get an LDCT lung cancer screening exam every year until you are 79, or are no longer willing or  able to undergo screening and possible procedures to diagnose and treat lung cancer.  How effective is LDCT at preventing death from lung cancer?  Studies have shown that LDCT lung cancer screening can lower the risk of death from lung cancer by 20 percent in people who are at high-risk.  What are the risks?  There are some risks and limitations of LDCT lung cancer screening. We want to make sure you understand the risks and benefits, so please let us know if you have any questions. Your doctor may want to talk with you more about these risks.    Radiation exposure: As with any exam that uses radiation, there is a very small increased risk of cancer. The amount of radiation in LDCT is small--about the same amount a person would get from a mammogram. Your doctor orders the exam when he or she feels the potential benefits outweigh the risks.    False negatives: No test is perfect, including LDCT. It is possible that you may have a medical condition, including lung cancer, that is not found during your exam. This is called a false negative result.    False positives and more testing: LDCT very often finds something in the lung that could be cancer, but in fact is not. This is called a false positive result. False positive tests often cause anxiety. To make sure these findings are not cancer, you may need to have more tests. These tests will be done only if you give us permission. Sometimes patients need a treatment that can have side effects, such as a biopsy. For more information on false positives, see  What can I expect from the results?     Findings not related to lung cancer: Your LDCT exam also takes pictures of areas of your body next to your lungs. In a very small number of cases, the CT scan will show an abnormal finding in one of these areas, such as your kidneys, adrenal glands, liver or thyroid. This finding may not be serious, but you may need more tests. Your doctor can help you decide what other tests  you may need, if any.  What can I expect from the results?  About 1 out of 4 LDCT exams will find something that may need more tests. Most of the time, these findings are lung nodules. Lung nodules are very small collections of tissue in the lung. These nodules are very common, and the vast majority--more than 97 percent--are not cancer (benign). Most are normal lymph nodes or small areas of scarring from past infections.  But, if a small lung nodule is found to be cancer, the cancer can be cured more than 90 percent of the time. To know if the nodule is cancer, we may need to get more images before your next yearly screening exam. If the nodule has suspicious features (for example, it is large, has an odd shape or grows over time), we will refer you to a specialist for further testing.  Will my doctor also get the results?  Yes. Your doctor will get a copy of your results.  Is it okay to keep smoking now that there s a cancer screening exam?  No. Tobacco is one of the strongest cancer-causing agents. It causes not only lung cancer, but other cancers and cardiovascular (heart) diseases as well. The damage caused by smoking builds over time. This means that the longer you smoke, the higher your risk of disease. While it is never too late to quit, the sooner you quit, the better.  Where can I find help to quit smoking?  The best way to prevent lung cancer is to stop smoking. If you have already quit smoking, congratulations and keep it up! For help on quitting smoking, please call [employer] at 1-173-QUITNOW (1-225.538.8729) or the American [employer] Society at 1-135.441.7877 to find local resources near you.  One-on-one health coaching:  If you d prefer to work individually with a health care provider on tobacco cessation, we offer:      Medication Therapy Management:  Our specially trained pharmacists work closely with you and your doctor to help you quit smoking.  Call [phone] or [phone] (toll free).

## 2024-11-25 NOTE — PROGRESS NOTES
Preventive Care Visit  [facility]  HUBER Hatfield CNP, Family Medicine  Nov 25, 2024      Assessment & Plan     Encounter for Medicare annual wellness exam  Continue yearly wellness visit    Age-related osteoporosis without current pathological fracture  Due for screening -   History of side effects from Reclast - vision problems   Continues with calcium and vitamin D supplement   Discussed working on resistance activities   - Vitamin D Deficiency; Future  - DX Bone Density; Future    Major depressive disorder, recurrent episode, moderate (H)  MOOKIE (generalized anxiety disorder)  Currently controlled with current treatment  Continue with Prozac and rare use of clonazepam   - Comprehensive metabolic panel (BMP + Alb, Alk Phos, ALT, AST, Total. Bili, TP); Future    Lipid screening  Cholesterol and LDL have increased HDL remains around 70  Currently no treatment   Borderline needing to start statin   Work on healthy eating, staying active and maintaining a healthy weight   - Lipid Profile (Chol, Trig, HDL, LDL calc); Future  The 10-year ASCVD risk score (Eduardo MEDEROS, et al., 2019) is: 8.9%    Values used to calculate the score:      Age: 69 years      Sex: Female      Is Non- : No      Diabetic: No      Tobacco smoker: No      Systolic Blood Pressure: 130 mmHg      Is BP treated: No      HDL Cholesterol: 70 mg/dL      Total Cholesterol: 248 mg/dL      Palpitations  No current symptoms or treatment     Personal history of tobacco use  Due for screening   - Prof fee: Shared Decision Making for Lung Cancer Screening  - CT Chest Lung Cancer Scrn Low Dose wo; Future    Breast pain, right  Recurrent discomfort to right lower breast area.  Reproducible with exam.  No lump noted.  Possible muscular   - MA Diagnostic Right w/Damon; Future  - US Breast Right Limited 1-3 Quadrants; Future    Patient has been advised of split billing requirements and indicates understanding:  Yes        BMI  Estimated body mass index is 25.45 kg/m  as calculated from the following:    Height as of this encounter: 1.524 m (5').    Weight as of this encounter: 59.1 kg (130 lb 4.8 oz).   Weight management plan: Discussed healthy diet and exercise guidelines    Counseling  Appropriate preventive services were addressed with this patient via screening, questionnaire, or discussion as appropriate for fall prevention, nutrition, physical activity, Tobacco-use cessation, social engagement, weight loss and cognition.  Checklist reviewing preventive services available has been given to the patient.  Reviewed patient's diet, addressing concerns and/or questions.   She is at risk for lack of exercise and has been provided with information to increase physical activity for the benefit of her well-being.   She is at risk for psychosocial distress and has been provided with information to reduce risk.   The patient was provided with written information regarding signs of hearing loss.   Information on urinary incontinence and treatment options given to patient.       See Patient Instructions    No follow-ups on file.    Walter Min is a 69 year old, presenting for the following:  Physical, Anxiety, and Depression        11/25/2024     7:50 AM   Additional Questions   Roomed by Charleen Valdez   Accompanied by self           Anxiety        Depression and Anxiety   How are you doing with your depression since your last visit? No change  How are you doing with your anxiety since your last visit?  No change  Are you having other symptoms that might be associated with depression or anxiety? No  Have you had a significant life event? No   Do you have any concerns with your use of alcohol or other drugs? No  Rarely uses clonazepam - works well as needed   Social History     Tobacco Use    Smoking status: Former     Current packs/day: 0.00     Average packs/day: 0.5 packs/day for 44.6 years (22.3 ttl pk-yrs)     Types:  Cigarettes     Start date: 1975     Quit date: 2019     Years since quittin.3     Passive exposure: Past    Smokeless tobacco: Never    Tobacco comments:     quit on and off through the years   Vaping Use    Vaping status: Never Used   Substance Use Topics    Alcohol use: Yes     Comment: Occasional    Drug use: No         2022     8:15 AM 2023     7:47 AM 2024     7:48 AM   PHQ   PHQ-9 Total Score 1 4 4    Q9: Thoughts of better off dead/self-harm past 2 weeks Not at all Not at all  Not at all        Patient-reported         2022     8:15 AM 2023     7:48 AM 2024     7:49 AM   MOOKIE-7 SCORE   Total Score  7 (mild anxiety) 4 (minimal anxiety)   Total Score 3 7 4        Patient-reported         2024     7:48 AM   Last PHQ-9   1.  Little interest or pleasure in doing things 1    2.  Feeling down, depressed, or hopeless 1    3.  Trouble falling or staying asleep, or sleeping too much 1    4.  Feeling tired or having little energy 1    5.  Poor appetite or overeating 0    6.  Feeling bad about yourself 0    7.  Trouble concentrating 0    8.  Moving slowly or restless 0    Q9: Thoughts of better off dead/self-harm past 2 weeks 0    PHQ-9 Total Score 4        Patient-reported         2024     7:49 AM   MOOKIE-7    1. Feeling nervous, anxious, or on edge 1    2. Not being able to stop or control worrying 1    3. Worrying too much about different things 1    4. Trouble relaxing 0    5. Being so restless that it is hard to sit still 0    6. Becoming easily annoyed or irritable 1    7. Feeling afraid, as if something awful might happen 0    MOOKIE-7 Total Score 4    If you checked any problems, how difficult have they made it for you to do your work, take care of things at home, or get along with other people? Not difficult at all        Patient-reported       Suicide Assessment Five-step Evaluation and Treatment (SAFE-T)      Health Care Directive  Patient does not have a  Health Care Directive: Discussed advance care planning with patient; however, patient declined at this time.      11/20/2024   General Health   How would you rate your overall physical health? Good   Feel stress (tense, anxious, or unable to sleep) To some extent      (!) STRESS CONCERN      11/20/2024   Nutrition   Diet: Regular (no restrictions)    Low fat/cholesterol       Multiple values from one day are sorted in reverse-chronological order         11/20/2024   Exercise   Days per week of moderate/strenous exercise 3 days   Average minutes spent exercising at this level 30 min            11/20/2024   Social Factors   Frequency of gathering with friends or relatives Once a week   Worry food won't last until get money to buy more No   Food not last or not have enough money for food? No   Do you have housing? (Housing is defined as stable permanent housing and does not include staying ouside in a car, in a tent, in an abandoned building, in an overnight shelter, or couch-surfing.) Yes   Are you worried about losing your housing? No   Lack of transportation? No   Unable to get utilities (heat,electricity)? No            11/20/2024   Fall Risk   Fallen 2 or more times in the past year? No     No    Trouble with walking or balance? No     No        Patient-reported    Multiple values from one day are sorted in reverse-chronological order          11/20/2024   Activities of Daily Living- Home Safety   Needs help with the following daily activites None of the above   Safety concerns in the home None of the above            11/20/2024   Dental   Dentist two times every year? Yes            11/20/2024   Hearing Screening   Hearing concerns? (!) I NEED TO ASK PEOPLE TO SPEAK UP OR REPEAT THEMSELVES.            11/20/2024   Driving Risk Screening   Patient/family members have concerns about driving No            11/20/2024   General Alertness/Fatigue Screening   Have you been more tired than usual lately? No             2024   Urinary Incontinence Screening   Bothered by leaking urine in past 6 months Yes            2024   TB Screening   Were you born outside of the [place]? No          Today's PHQ-9 Score:       2024     7:48 AM   PHQ-9 SCORE   PHQ-9 Total Score MyChart 4 (Minimal depression)   PHQ-9 Total Score 4        Patient-reported         2024   Substance Use   Alcohol more than 3/day or more than 7/wk No   Do you have a current opioid prescription? No   How severe/bad is pain from 1 to 10? 1/10   Do you use any other substances recreationally? No        Social History     Tobacco Use    Smoking status: Former     Current packs/day: 0.00     Average packs/day: 0.5 packs/day for 44.6 years (22.3 ttl pk-yrs)     Types: Cigarettes     Start date: 1975     Quit date: 2019     Years since quittin.3     Passive exposure: Past    Smokeless tobacco: Never    Tobacco comments:     quit on and off through the years   Vaping Use    Vaping status: Never Used   Substance Use Topics    Alcohol use: Yes     Comment: Occasional    Drug use: No           2/15/2024   LAST FHS-7 RESULTS   1st degree relative breast or ovarian cancer No   Any relative bilateral breast cancer No   Any male have breast cancer No   Any ONE woman have BOTH breast AND ovarian cancer No   Any woman with breast cancer before 50yrs No   2 or more relatives with breast AND/OR ovarian cancer No   2 or more relatives with breast AND/OR bowel cancer No           Mammogram Screening - Mammogram every 1-2 years updated in Health Maintenance based on mutual decision making      History of abnormal Pap smear:        ASCVD Risk   The 10-year ASCVD risk score (Eduardo MEDEROS, et al., 2019) is: 8.6%    Values used to calculate the score:      Age: 69 years      Sex: Female      Is Non- : No      Diabetic: No      Tobacco smoker: No      Systolic Blood Pressure: 130 mmHg      Is BP treated: No      HDL Cholesterol: 71  mg/dL      Total Cholesterol: 226 mg/dL    Fracture Risk Assessment Tool  Link to Frax Calculator  Use the information below to complete the Frax calculator  : 1955  Sex: female  Weight (kg): 59.1 kg (actual weight)  Height (cm): 152.4 cm  Previous Fragility Fracture:  No  History of parent with fractured hip:  Yes  Current Smoking:  No  Patient has been on glucocorticoids for more than 3 months (5mg/day or more): No  Rheumatoid Arthritis on Problem List:  No  Secondary Osteoporosis on Problem List:  No  Consumes 3 or more units of alcohol per day: No  Femoral Neck BMD (g/cm2)            Reviewed and updated as needed this visit by Provider                    Lab work is in process  Labs reviewed in EPIC  BP Readings from Last 3 Encounters:   24 130/80   24 133/61   23 132/76    Wt Readings from Last 3 Encounters:   24 59.1 kg (130 lb 4.8 oz)   24 60.8 kg (134 lb 0.6 oz)   23 60.8 kg (134 lb)                  Patient Active Problem List   Diagnosis    Major depressive disorder, recurrent episode, moderate (H)    ACP (advance care planning)    MOOKIE (generalized anxiety disorder)    Age-related osteoporosis without current pathological fracture     Past Surgical History:   Procedure Laterality Date    ABDOMEN SURGERY      Tumor removed from ovary    BIOPSY   and     Surgical breast biopsy/needle biopsy breast    breast biopsy      RT    COLONOSCOPY      COLONOSCOPY N/A 2021    Procedure: Colonoscopy, with polypectomy;  Surgeon: Gerber Panda MD;  Location: [facility]    DILATION AND CURETTAGE      D&C    ESOPHAGOSCOPY, GASTROSCOPY, DUODENOSCOPY (EGD), COMBINED      excision      ovarian tumor, LT    HYSTERECTOMY TOTAL ABDOMINAL N/A     Cervix removed per patient.     RELEASE TRIGGER FINGER      RT    SALPINGO OOPHORECTOMY,R/L/DANIEL Left        Social History     Tobacco Use    Smoking status: Former     Current packs/day: 0.00     Average packs/day: 0.5  packs/day for 44.6 years (22.3 ttl pk-yrs)     Types: Cigarettes     Start date: 1975     Quit date: 2019     Years since quittin.3     Passive exposure: Past    Smokeless tobacco: Never    Tobacco comments:     quit on and off through the years   Substance Use Topics    Alcohol use: Yes     Comment: Occasional     Family History   Problem Relation Age of Onset    Cerebrovascular Disease Father     Heart Disease Father 50        Heart failure, cause of death/Myocardial infarction    Cancer - colorectal Maternal Grandmother     Colon Cancer Maternal Grandmother     Cancer Mother         Lung    Other Cancer Mother     Hypertension Brother         High blood pressure    Coronary Artery Disease Sister         Aortic aneurysm    Osteoporosis Sister     Hypertension Brother         High blood pressure    Other Cancer Brother          Current Outpatient Medications   Medication Sig Dispense Refill    calcium carbonate-vitamin D (OS-KENNEDI) 600-400 MG-UNIT chewable tablet Take 1 chew tab by mouth 2 times daily 100 tablet 3    clonazePAM (KLONOPIN) 0.5 MG tablet TAKE 1/2 TO 1 (ONE-HALF TO ONE) TABLET BY MOUTH TWICE DAILY AS NEEDED 30 tablet 0    FLUoxetine (PROZAC) 20 MG capsule Take 1 capsule by mouth once daily 90 capsule 0    timolol maleate (TIMOPTIC) 0.5 % ophthalmic solution INSTILL 1 DROP INTO EACH EYE IN THE MORNING       Allergies   Allergen Reactions    Morphine Other (See Comments)     Hypotension     Paxil [Paroxetine Mesylate] Other (See Comments)     Intolerance  Paroxetine HCL        Reclast [Zoledronic Acid] Visual Disturbance    Trace Minerals [id number]      Current providers sharing in care for this patient include:  Patient Care Team:  Claribel Mccoy APRN CNP as PCP - General (Family Medicine)  Claribel Mccoy APRN CNP as Assigned PCP  Liudmila Baker CNP as Assigned Heart and Vascular Provider    The following health maintenance items are reviewed in Epic and correct as  of today:  Health Maintenance   Topic Date Due    HEPATITIS C SCREENING  Never done    RSV VACCINE (1 - Risk 60-74 years 1-dose series) Never done    LUNG CANCER SCREENING  12/28/2022    DEXA  05/24/2023    LIPID  11/22/2024    MAMMO SCREENING  02/15/2025    MOOKIE ASSESSMENT  05/25/2025    PHQ-9  05/25/2025    MEDICARE ANNUAL WELLNESS VISIT  11/25/2025    FALL RISK ASSESSMENT  11/25/2025    COLORECTAL CANCER SCREENING  09/09/2026    GLUCOSE  11/22/2026    ADVANCE CARE PLANNING  11/22/2028    DTAP/TDAP/TD IMMUNIZATION (2 - Td or Tdap) 05/19/2031    DEPRESSION ACTION PLAN  Completed    INFLUENZA VACCINE  Completed    Pneumococcal Vaccine: 65+ Years  Completed    ZOSTER IMMUNIZATION  Completed    COVID-19 Vaccine  Completed    HPV IMMUNIZATION  Aged Out    MENINGITIS IMMUNIZATION  Aged Out    RSV MONOCLONAL ANTIBODY  Aged Out         Review of Systems  CONSTITUTIONAL: NEGATIVE for fever, chills, change in weight  INTEGUMENTARY/SKIN: NEGATIVE for worrisome rashes, moles or lesions  EYES: visual problems after using reclast has her eye exam every 3-6 months since using reclast   ENT/MOUTH: NEGATIVE for ear, mouth and throat problems  RESP:NEGATIVE for significant cough or SOB  BREAST: right lower breast/rib pain  CV: palpitations   GI: NEGATIVE for nausea, abdominal pain, heartburn, or change in bowel habits  : denies dysuria   MUSCULOSKELETAL: right lower rib possible vs right lower breast   NEURO: NEGATIVE for weakness, dizziness or paresthesias  ENDOCRINE: NEGATIVE for temperature intolerance, skin/hair changes  PSYCHIATRIC: NEGATIVE for changes in mood or affect     Objective    Exam  /80   Pulse 71   Temp 99  F (37.2  C) (Tympanic)   Resp 17   Ht 1.524 m (5')   Wt 59.1 kg (130 lb 4.8 oz)   SpO2 98%   BMI 25.45 kg/m     Estimated body mass index is 25.45 kg/m  as calculated from the following:    Height as of this encounter: 1.524 m (5').    Weight as of this encounter: 59.1 kg (130 lb 4.8  oz).    Physical Exam  GENERAL: alert and no distress  EYES: Eyes grossly normal to inspection, PERRL and conjunctivae and sclerae normal  HENT: ear canals and TM's normal, nose and mouth without ulcers or lesions  NECK: no adenopathy, no asymmetry, masses, or scars  RESP: lungs clear to auscultation - no rales, rhonchi or wheezes  BREAST: no palpable axillary masses or adenopathy and tenderness right lower outer breast   CV: regular rate and rhythm, normal S1 S2, no S3 or S4, no murmur, click or rub, no peripheral edema  ABDOMEN: soft, nontender, no hepatosplenomegaly, no masses and bowel sounds normal  MS: no gross musculoskeletal defects noted, no edema  SKIN: no suspicious lesions or rashes  NEURO: Normal strength and tone, sensory exam grossly normal, mentation intact, speech normal, and cranial nerves 2-12 intact  PSYCH: mentation appears normal, affect normal/bright  LYMPH: no cervical, supraclavicular, axillary, or inguinal adenopathy        11/25/2024   Mini Cog   Mini-Cog Not Completed (choose reason) Patient declines                 Signed Electronically by: HUBER Hatfield CNP    Answers submitted by the patient for this visit:  Patient Health Questionnaire (Submitted on 11/25/2024)  If you checked off any problems, how difficult have these problems made it for you to do your work, take care of things at home, or get along with other people?: Not difficult at all  PHQ9 TOTAL SCORE: 4  Patient Health Questionnaire (G7) (Submitted on 11/25/2024)  MOOKIE 7 TOTAL SCORE: 4  Lung Cancer Screening Shared Decision Making Visit     Mechelle Fong, a 69 year old female, is eligible for lung cancer screening    History   Smoking Status    Former    Types: Cigarettes   Smokeless Tobacco    Never       I have discussed with patient the risks and benefits of screening for lung cancer with low-dose CT.     The risks include:    radiation exposure: one low dose chest CT has as much ionizing radiation as about 15  chest x-rays, or 6 months of background radiation living in [place]      false positives: most findings/nodules are NOT cancer, but some might still require additional diagnostic evaluation, including biopsy    over-diagnosis: some slow growing cancers that might never have been clinically significant will be detected and treated unnecessarily     The benefit of early detection of lung cancer is contingent upon adherence to annual screening or more frequent follow up if indicated.     Furthermore, to benefit from screening, Mechelle must be willing and able to undergo diagnostic procedures, if indicated. Although no specific guide is available for determining severity of comorbidities, it is reasonable to withhold screening in patients who have greater mortality risk from other diseases.     We did discuss that the best way to prevent lung cancer is to not smoke.    Some patients may value a numeric estimation of lung cancer risk when evaluating if lung cancer screening is right for them, here is one calculator:    ShouldIScreen

## 2024-12-02 ENCOUNTER — HOSPITAL ENCOUNTER (OUTPATIENT)
Dept: CT IMAGING | Facility: HOSPITAL | Age: 69
Discharge: HOME | End: 2024-12-02
Attending: NURSE PRACTITIONER
Payer: MEDICARE

## 2024-12-02 ENCOUNTER — HOSPITAL ENCOUNTER (OUTPATIENT)
Dept: BONE DENSITY | Facility: HOSPITAL | Age: 69
Discharge: HOME | End: 2024-12-02
Attending: NURSE PRACTITIONER
Payer: MEDICARE

## 2024-12-02 ENCOUNTER — MYC REFILL (OUTPATIENT)
Dept: FAMILY MEDICINE | Facility: OTHER | Age: 69
End: 2024-12-02

## 2024-12-02 DIAGNOSIS — M81.0 AGE-RELATED OSTEOPOROSIS WITHOUT CURRENT PATHOLOGICAL FRACTURE: ICD-10-CM

## 2024-12-02 DIAGNOSIS — Z87.891 PERSONAL HISTORY OF TOBACCO USE: ICD-10-CM

## 2024-12-02 PROCEDURE — 77080 DXA BONE DENSITY AXIAL: CPT

## 2024-12-02 PROCEDURE — 71271 CT THORAX LUNG CANCER SCR C-: CPT

## 2024-12-02 RX ORDER — TIMOLOL MALEATE 5 MG/ML
1 SOLUTION/ DROPS OPHTHALMIC EVERY MORNING
Qty: 5 ML | Refills: 3 | OUTPATIENT
Start: 2024-12-02

## 2024-12-02 NOTE — TELEPHONE ENCOUNTER
Timolol Maleate (Timoptic) 0.5% ophthalmic soln.    Last Written Prescription Date:  11/20/2023  Last Fill Quantity: 5 mL,   # refills: 3  Last Office Visit: 11/25/2024

## 2024-12-06 ENCOUNTER — HOSPITAL ENCOUNTER (OUTPATIENT)
Dept: ULTRASOUND IMAGING | Facility: HOSPITAL | Age: 69
Discharge: HOME | End: 2024-12-06
Attending: NURSE PRACTITIONER
Payer: MEDICARE

## 2024-12-06 ENCOUNTER — HOSPITAL ENCOUNTER (OUTPATIENT)
Dept: MAMMOGRAPHY | Facility: OTHER | Age: 69
Discharge: HOME | End: 2024-12-06
Attending: NURSE PRACTITIONER
Payer: MEDICARE

## 2024-12-06 DIAGNOSIS — N64.4 BREAST PAIN, RIGHT: ICD-10-CM

## 2024-12-06 PROCEDURE — 76642 ULTRASOUND BREAST LIMITED: CPT | Mod: RT

## 2024-12-06 PROCEDURE — 77061 BREAST TOMOSYNTHESIS UNI: CPT | Mod: TC,RT

## 2024-12-06 PROCEDURE — 77065 DX MAMMO INCL CAD UNI: CPT | Mod: TC,RT

## 2025-05-19 DIAGNOSIS — F33.1 MAJOR DEPRESSIVE DISORDER, RECURRENT EPISODE, MODERATE (H): ICD-10-CM

## 2025-05-22 ENCOUNTER — MYC REFILL (OUTPATIENT)
Dept: FAMILY MEDICINE | Facility: OTHER | Age: 70
End: 2025-05-22

## 2025-05-22 DIAGNOSIS — F33.1 MAJOR DEPRESSIVE DISORDER, RECURRENT EPISODE, MODERATE (H): ICD-10-CM

## 2025-08-24 ENCOUNTER — MYC REFILL (OUTPATIENT)
Dept: FAMILY MEDICINE | Facility: OTHER | Age: 70
End: 2025-08-24

## 2025-08-24 DIAGNOSIS — F33.1 MAJOR DEPRESSIVE DISORDER, RECURRENT EPISODE, MODERATE (H): ICD-10-CM

## (undated) DEVICE — IRRIGATION-H2O 1000ML

## (undated) DEVICE — TUBING-SUCTION 20FT

## (undated) DEVICE — FORCEP-COLON BIOPSY LARGE W/NEEDLE 240CM

## (undated) DEVICE — CONNECTOR-ERBEFLO 2 PORT

## (undated) RX ORDER — ATROPINE SULFATE 0.4 MG/ML
AMPUL (ML) INJECTION
Status: DISPENSED
Start: 2021-09-09

## (undated) RX ORDER — PROPOFOL 10 MG/ML
INJECTION, EMULSION INTRAVENOUS
Status: DISPENSED
Start: 2021-09-09

## (undated) RX ORDER — LIDOCAINE HYDROCHLORIDE 20 MG/ML
INJECTION, SOLUTION EPIDURAL; INFILTRATION; INTRACAUDAL; PERINEURAL
Status: DISPENSED
Start: 2021-09-09